# Patient Record
Sex: FEMALE | Race: BLACK OR AFRICAN AMERICAN | NOT HISPANIC OR LATINO | Employment: FULL TIME | URBAN - METROPOLITAN AREA
[De-identification: names, ages, dates, MRNs, and addresses within clinical notes are randomized per-mention and may not be internally consistent; named-entity substitution may affect disease eponyms.]

---

## 2017-02-24 ENCOUNTER — HISTORICAL (OUTPATIENT)
Dept: ADMINISTRATIVE | Facility: HOSPITAL | Age: 32
End: 2017-02-24

## 2017-03-24 ENCOUNTER — HISTORICAL (OUTPATIENT)
Dept: ADMINISTRATIVE | Facility: HOSPITAL | Age: 32
End: 2017-03-24

## 2019-01-28 ENCOUNTER — HISTORICAL (OUTPATIENT)
Dept: ADMINISTRATIVE | Facility: HOSPITAL | Age: 34
End: 2019-01-28

## 2019-01-28 LAB
CORTIS SERPL-SCNC: 6 MCG/DL
DEPRECATED CALCIDIOL+CALCIFEROL SERPL-MC: 35.83 NG/ML (ref 30–80)
GLUCOSE 1.5H P 100 G GLC PO SERPL-MCNC: 92 MG/DL
GLUCOSE 1H P 100 G GLC PO SERPL-MCNC: 119 MG/DL (ref 100–180)
GLUCOSE 2H P 100 G GLC PO SERPL-MCNC: 106 MG/DL (ref 70–140)
GLUCOSE 30M P 100 G GLC PO SERPL-MCNC: 134 MG/DL (ref 100–200)
GLUCOSE BS SERPL-MCNC: 85 MG/DL (ref 70–115)
T4 FREE SERPL-MCNC: 1.05 NG/DL (ref 0.76–1.46)
T4 SERPL-MCNC: 8.1 MCG/DL (ref 4.7–13.3)
TSH SERPL-ACNC: 0.84 MIU/L (ref 0.36–3.74)

## 2019-09-18 ENCOUNTER — HISTORICAL (OUTPATIENT)
Dept: RADIOLOGY | Facility: HOSPITAL | Age: 34
End: 2019-09-18

## 2020-11-05 ENCOUNTER — HISTORICAL (OUTPATIENT)
Dept: ADMINISTRATIVE | Facility: HOSPITAL | Age: 35
End: 2020-11-05

## 2020-11-05 LAB
ABS NEUT (OLG): 2.19 X10(3)/MCL (ref 2.1–9.2)
ALBUMIN SERPL-MCNC: 3.9 GM/DL (ref 3.5–5)
ALBUMIN/GLOB SERPL: 1.3 RATIO (ref 1.1–2)
ALP SERPL-CCNC: 77 UNIT/L (ref 40–150)
ALT SERPL-CCNC: 18 UNIT/L (ref 0–55)
AST SERPL-CCNC: 19 UNIT/L (ref 5–34)
BASOPHILS # BLD AUTO: 0 X10(3)/MCL (ref 0–0.2)
BASOPHILS NFR BLD AUTO: 1 %
BILIRUB SERPL-MCNC: 0.5 MG/DL
BILIRUBIN DIRECT+TOT PNL SERPL-MCNC: 0.2 MG/DL (ref 0–0.5)
BILIRUBIN DIRECT+TOT PNL SERPL-MCNC: 0.3 MG/DL (ref 0–0.8)
BUN SERPL-MCNC: 8.7 MG/DL (ref 7–18.7)
CALCIUM SERPL-MCNC: 8.9 MG/DL (ref 8.4–10.2)
CHLORIDE SERPL-SCNC: 104 MMOL/L (ref 98–107)
CHOLEST SERPL-MCNC: 220 MG/DL
CHOLEST/HDLC SERPL: 7 {RATIO} (ref 0–5)
CO2 SERPL-SCNC: 28 MMOL/L (ref 22–29)
CREAT SERPL-MCNC: 0.84 MG/DL (ref 0.55–1.02)
DEPRECATED CALCIDIOL+CALCIFEROL SERPL-MC: 27.8 NG/ML (ref 30–80)
EOSINOPHIL # BLD AUTO: 0.1 X10(3)/MCL (ref 0–0.9)
EOSINOPHIL NFR BLD AUTO: 1 %
ERYTHROCYTE [DISTWIDTH] IN BLOOD BY AUTOMATED COUNT: 13 % (ref 11.5–17)
EST. AVERAGE GLUCOSE BLD GHB EST-MCNC: 114 MG/DL
GLOBULIN SER-MCNC: 3 GM/DL (ref 2.4–3.5)
GLUCOSE SERPL-MCNC: 90 MG/DL (ref 74–100)
HBA1C MFR BLD: 5.6 %
HCT VFR BLD AUTO: 41.9 % (ref 37–47)
HDLC SERPL-MCNC: 30 MG/DL (ref 35–60)
HGB BLD-MCNC: 13.4 GM/DL (ref 12–16)
LDLC SERPL CALC-MCNC: 170 MG/DL (ref 50–140)
LYMPHOCYTES # BLD AUTO: 2.1 X10(3)/MCL (ref 0.6–4.6)
LYMPHOCYTES NFR BLD AUTO: 42 %
MCH RBC QN AUTO: 28.2 PG (ref 27–31)
MCHC RBC AUTO-ENTMCNC: 32 GM/DL (ref 33–36)
MCV RBC AUTO: 88.2 FL (ref 80–94)
MONOCYTES # BLD AUTO: 0.5 X10(3)/MCL (ref 0.1–1.3)
MONOCYTES NFR BLD AUTO: 11 %
NEUTROPHILS # BLD AUTO: 2.19 X10(3)/MCL (ref 2.1–9.2)
NEUTROPHILS NFR BLD AUTO: 44 %
PLATELET # BLD AUTO: 264 X10(3)/MCL (ref 130–400)
PMV BLD AUTO: 10.3 FL (ref 9.4–12.4)
POTASSIUM SERPL-SCNC: 4.3 MMOL/L (ref 3.5–5.1)
PROT SERPL-MCNC: 6.9 GM/DL (ref 6.4–8.3)
RBC # BLD AUTO: 4.75 X10(6)/MCL (ref 4.2–5.4)
SODIUM SERPL-SCNC: 141 MMOL/L (ref 136–145)
TRIGL SERPL-MCNC: 101 MG/DL (ref 37–140)
TSH SERPL-ACNC: 1.07 UIU/ML (ref 0.35–4.94)
VLDLC SERPL CALC-MCNC: 20 MG/DL
WBC # SPEC AUTO: 4.9 X10(3)/MCL (ref 4.5–11.5)

## 2021-02-11 LAB
PAP RECOMMENDATION EXT: NORMAL
PAP SMEAR: NORMAL

## 2021-05-12 ENCOUNTER — HISTORICAL (OUTPATIENT)
Dept: ADMINISTRATIVE | Facility: HOSPITAL | Age: 36
End: 2021-05-12

## 2021-05-12 LAB
CHOLEST SERPL-MCNC: 224 MG/DL
CHOLEST/HDLC SERPL: 7 {RATIO} (ref 0–5)
HDLC SERPL-MCNC: 34 MG/DL (ref 35–60)
LDLC SERPL CALC-MCNC: 169 MG/DL (ref 50–140)
TRIGL SERPL-MCNC: 105 MG/DL (ref 37–140)
VLDLC SERPL CALC-MCNC: 21 MG/DL

## 2021-10-29 ENCOUNTER — HISTORICAL (OUTPATIENT)
Dept: ADMINISTRATIVE | Facility: HOSPITAL | Age: 36
End: 2021-10-29

## 2021-10-29 LAB
ABS NEUT (OLG): 3.16 X10(3)/MCL (ref 2.1–9.2)
ALBUMIN SERPL-MCNC: 4.1 GM/DL (ref 3.5–5)
ALBUMIN/GLOB SERPL: 1.2 RATIO (ref 1.1–2)
ALP SERPL-CCNC: 69 UNIT/L (ref 40–150)
ALT SERPL-CCNC: 18 UNIT/L (ref 0–55)
AST SERPL-CCNC: 15 UNIT/L (ref 5–34)
BASOPHILS # BLD AUTO: 0 X10(3)/MCL (ref 0–0.2)
BASOPHILS NFR BLD AUTO: 0 %
BILIRUB SERPL-MCNC: 0.5 MG/DL
BILIRUBIN DIRECT+TOT PNL SERPL-MCNC: 0.2 MG/DL (ref 0–0.5)
BILIRUBIN DIRECT+TOT PNL SERPL-MCNC: 0.3 MG/DL (ref 0–0.8)
BUN SERPL-MCNC: 12.3 MG/DL (ref 7–18.7)
CALCIUM SERPL-MCNC: 9.9 MG/DL (ref 8.7–10.5)
CHLORIDE SERPL-SCNC: 101 MMOL/L (ref 98–107)
CHOLEST SERPL-MCNC: 196 MG/DL
CHOLEST/HDLC SERPL: 7 {RATIO} (ref 0–5)
CO2 SERPL-SCNC: 30 MMOL/L (ref 22–29)
CREAT SERPL-MCNC: 0.86 MG/DL (ref 0.55–1.02)
DEPRECATED CALCIDIOL+CALCIFEROL SERPL-MC: 71 NG/ML (ref 30–80)
EOSINOPHIL # BLD AUTO: 0.1 X10(3)/MCL (ref 0–0.9)
EOSINOPHIL NFR BLD AUTO: 2 %
ERYTHROCYTE [DISTWIDTH] IN BLOOD BY AUTOMATED COUNT: 13.4 % (ref 11.5–17)
GLOBULIN SER-MCNC: 3.3 GM/DL (ref 2.4–3.5)
GLUCOSE SERPL-MCNC: 92 MG/DL (ref 74–100)
HCT VFR BLD AUTO: 41.8 % (ref 37–47)
HDLC SERPL-MCNC: 29 MG/DL (ref 35–60)
HGB BLD-MCNC: 13.5 GM/DL (ref 12–16)
LDLC SERPL CALC-MCNC: 139 MG/DL (ref 50–140)
LYMPHOCYTES # BLD AUTO: 2.1 X10(3)/MCL (ref 0.6–4.6)
LYMPHOCYTES NFR BLD AUTO: 36 %
MCH RBC QN AUTO: 28.2 PG (ref 27–31)
MCHC RBC AUTO-ENTMCNC: 32.3 GM/DL (ref 33–36)
MCV RBC AUTO: 87.3 FL (ref 80–94)
MONOCYTES # BLD AUTO: 0.6 X10(3)/MCL (ref 0.1–1.3)
MONOCYTES NFR BLD AUTO: 9 %
NEUTROPHILS # BLD AUTO: 3.16 X10(3)/MCL (ref 2.1–9.2)
NEUTROPHILS NFR BLD AUTO: 53 %
PLATELET # BLD AUTO: 315 X10(3)/MCL (ref 130–400)
PMV BLD AUTO: 10.8 FL (ref 9.4–12.4)
POTASSIUM SERPL-SCNC: 4.4 MMOL/L (ref 3.5–5.1)
PROT SERPL-MCNC: 7.4 GM/DL (ref 6.4–8.3)
RBC # BLD AUTO: 4.79 X10(6)/MCL (ref 4.2–5.4)
SODIUM SERPL-SCNC: 139 MMOL/L (ref 136–145)
TRIGL SERPL-MCNC: 138 MG/DL (ref 37–140)
TSH SERPL-ACNC: 1.37 UIU/ML (ref 0.35–4.94)
VLDLC SERPL CALC-MCNC: 28 MG/DL
WBC # SPEC AUTO: 6 X10(3)/MCL (ref 4.5–11.5)

## 2021-12-29 ENCOUNTER — HISTORICAL (OUTPATIENT)
Dept: ADMINISTRATIVE | Facility: HOSPITAL | Age: 36
End: 2021-12-29

## 2021-12-29 LAB
FLUAV AG UPPER RESP QL IA.RAPID: NEGATIVE
FLUBV AG UPPER RESP QL IA.RAPID: NEGATIVE
SARS-COV-2 RNA RESP QL NAA+PROBE: DETECTED

## 2021-12-30 ENCOUNTER — HISTORICAL (OUTPATIENT)
Dept: ADMINISTRATIVE | Facility: HOSPITAL | Age: 36
End: 2021-12-30

## 2021-12-30 LAB
B-HCG FREE SERPL-ACNC: 212.84 MIU/ML
PROGEST SERPL-MCNC: 45.1 NG/ML

## 2022-01-03 ENCOUNTER — HISTORICAL (OUTPATIENT)
Dept: ADMINISTRATIVE | Facility: HOSPITAL | Age: 37
End: 2022-01-03

## 2022-01-03 LAB
B-HCG FREE SERPL-ACNC: 720.97 MIU/ML
PROGEST SERPL-MCNC: 31.7 NG/ML

## 2022-02-07 ENCOUNTER — HISTORICAL (OUTPATIENT)
Dept: ADMINISTRATIVE | Facility: HOSPITAL | Age: 37
End: 2022-02-07

## 2022-02-09 ENCOUNTER — HISTORICAL (OUTPATIENT)
Dept: ADMINISTRATIVE | Facility: HOSPITAL | Age: 37
End: 2022-02-09

## 2022-02-09 LAB
ERYTHROCYTE [DISTWIDTH] IN BLOOD BY AUTOMATED COUNT: 14.9 % (ref 11.5–17)
HCT VFR BLD AUTO: 36.2 % (ref 37–47)
HGB BLD-MCNC: 11.1 G/DL (ref 12–16)
MCH RBC QN AUTO: 25.7 PG (ref 27–31)
MCHC RBC AUTO-ENTMCNC: 30.7 G/DL (ref 33–36)
MCV RBC AUTO: 83.8 FL (ref 80–94)
PLATELET # BLD AUTO: 334 10*3/UL (ref 130–400)
PMV BLD AUTO: 10.3 FL (ref 9.4–12.4)
RBC # BLD AUTO: 4.32 10*6/UL (ref 4.2–5.4)
WBC # SPEC AUTO: 5.7 10*3/UL (ref 4.5–11.5)

## 2022-02-17 ENCOUNTER — HISTORICAL (OUTPATIENT)
Dept: ADMINISTRATIVE | Facility: HOSPITAL | Age: 37
End: 2022-02-17

## 2022-02-17 LAB
CBG: 85 (ref 70–115)
GLUCOSE 1H P 100 G GLC PO SERPL-MCNC: 192 MG/DL (ref 100–180)
GLUCOSE 2H P 100 G GLC PO SERPL-MCNC: 164 MG/DL (ref 70–140)
GLUCOSE 3H P 100 G GLC PO SERPL-MCNC: 102 MG/DL (ref 70–115)
GLUCOSE BS SERPL-MCNC: 85 MG/DL (ref 70–115)

## 2022-03-14 ENCOUNTER — HISTORICAL (OUTPATIENT)
Dept: ADMINISTRATIVE | Facility: HOSPITAL | Age: 37
End: 2022-03-14

## 2022-03-14 LAB — CREAT SERPL-MCNC: 0.7 MG/DL (ref 0.55–1.02)

## 2022-03-17 ENCOUNTER — HISTORICAL (OUTPATIENT)
Dept: SURGERY | Facility: HOSPITAL | Age: 37
End: 2022-03-17

## 2022-03-17 LAB
ABS NEUT (OLG): 3.3 (ref 2.1–9.2)
APPEARANCE, UA: CLEAR
BACTERIA SPEC CULT: NORMAL
BASOPHILS # BLD AUTO: 0 10*3/UL (ref 0–0.2)
BASOPHILS NFR BLD AUTO: 0 %
BILIRUB UR QL STRIP: NEGATIVE
BUDDING YEAST: NORMAL
CASTS, UA: NORMAL
CBG: 66 (ref 70–115)
CBG: 72 (ref 70–115)
COLOR UR: YELLOW
CRYSTALS: NORMAL
EOSINOPHIL # BLD AUTO: 0 10*3/UL (ref 0–0.9)
EOSINOPHIL NFR BLD AUTO: 1 %
ERYTHROCYTE [DISTWIDTH] IN BLOOD BY AUTOMATED COUNT: 17.2 % (ref 11.5–17)
GLUCOSE (UA): NEGATIVE
HCT VFR BLD AUTO: 37.4 % (ref 37–47)
HGB BLD-MCNC: 11.8 G/DL (ref 12–16)
HGB UR QL STRIP: NEGATIVE
KETONES UR QL STRIP: NORMAL
LEUKOCYTE ESTERASE UR QL STRIP: NEGATIVE
LYMPHOCYTES # BLD AUTO: 1.7 10*3/UL (ref 0.6–4.6)
LYMPHOCYTES NFR BLD AUTO: 30 %
MANUAL DIFF? (OHS): NO
MCH RBC QN AUTO: 25.7 PG (ref 27–31)
MCHC RBC AUTO-ENTMCNC: 31.6 G/DL (ref 33–36)
MCV RBC AUTO: 81.5 FL (ref 80–94)
MONOCYTES # BLD AUTO: 0.6 10*3/UL (ref 0.1–1.3)
MONOCYTES NFR BLD AUTO: 10 %
NEUTROPHILS # BLD AUTO: 3.3 10*3/UL (ref 2.1–9.2)
NEUTROPHILS NFR BLD AUTO: 58 %
NITRITE UR QL STRIP: NEGATIVE
PH UR STRIP: 7 [PH] (ref 5–9)
PLATELET # BLD AUTO: 289 10*3/UL (ref 130–400)
PMV BLD AUTO: 10.6 FL (ref 9.4–12.4)
PROT UR QL STRIP: NEGATIVE
RBC # BLD AUTO: 4.59 10*6/UL (ref 4.2–5.4)
RBC #/AREA URNS HPF: NORMAL /[HPF] (ref 0–2)
SMALL ROUND CELLS, UA: NORMAL
SP GR UR STRIP: 1.02 (ref 1–1.03)
SPERM URNS QL MICRO: NORMAL
SQUAMOUS EPITHELIAL, UA: NORMAL (ref 0–4)
TSH SERPL-ACNC: 1.08 M[IU]/L (ref 0.35–4.94)
UROBILINOGEN UR STRIP-ACNC: 1
WBC # SPEC AUTO: 5.7 10*3/UL (ref 4.5–11.5)
WBC #/AREA URNS HPF: NORMAL /[HPF] (ref 0–2)

## 2022-04-11 ENCOUNTER — HISTORICAL (OUTPATIENT)
Dept: ADMINISTRATIVE | Facility: HOSPITAL | Age: 37
End: 2022-04-11
Payer: COMMERCIAL

## 2022-04-25 VITALS
DIASTOLIC BLOOD PRESSURE: 80 MMHG | OXYGEN SATURATION: 99 % | BODY MASS INDEX: 35.11 KG/M2 | WEIGHT: 198.13 LBS | SYSTOLIC BLOOD PRESSURE: 120 MMHG | HEIGHT: 63 IN

## 2022-04-29 ENCOUNTER — HISTORICAL (OUTPATIENT)
Dept: ADMINISTRATIVE | Facility: HOSPITAL | Age: 37
End: 2022-04-29
Payer: COMMERCIAL

## 2022-04-29 LAB — TSH SERPL-ACNC: 1.15 M[IU]/L (ref 0.35–4.94)

## 2022-05-08 ENCOUNTER — HOSPITAL ENCOUNTER (EMERGENCY)
Facility: HOSPITAL | Age: 37
Discharge: HOME | End: 2022-05-08
Payer: COMMERCIAL

## 2022-05-08 VITALS
WEIGHT: 197 LBS | RESPIRATION RATE: 20 BRPM | SYSTOLIC BLOOD PRESSURE: 136 MMHG | HEART RATE: 101 BPM | DIASTOLIC BLOOD PRESSURE: 87 MMHG | BODY MASS INDEX: 34.91 KG/M2 | HEIGHT: 63 IN

## 2022-05-08 DIAGNOSIS — R10.9 ABDOMINAL PAIN AFFECTING PREGNANCY: Primary | ICD-10-CM

## 2022-05-08 DIAGNOSIS — O26.899 ABDOMINAL PAIN AFFECTING PREGNANCY: Primary | ICD-10-CM

## 2022-05-08 LAB
APPEARANCE UR: CLEAR
BACTERIA #/AREA URNS AUTO: NORMAL /HPF
BILIRUB UR QL STRIP.AUTO: NEGATIVE MG/DL
COLOR UR AUTO: YELLOW
GLUCOSE UR QL STRIP.AUTO: NEGATIVE MG/DL
KETONES UR QL STRIP.AUTO: NEGATIVE MG/DL
LEUKOCYTE ESTERASE UR QL STRIP.AUTO: NEGATIVE UNIT/L
NITRITE UR QL STRIP.AUTO: NEGATIVE
PH UR STRIP.AUTO: 6.5 [PH]
PROT UR QL STRIP.AUTO: NEGATIVE MG/DL
RBC #/AREA URNS AUTO: NORMAL /HPF
RBC UR QL AUTO: NEGATIVE UNIT/L
SP GR UR STRIP.AUTO: 1.01 (ref 1–1.03)
SQUAMOUS #/AREA URNS AUTO: NORMAL /LPF
UROBILINOGEN UR STRIP-ACNC: 1 MG/DL
WBC #/AREA URNS AUTO: NORMAL /HPF

## 2022-05-08 PROCEDURE — 96360 HYDRATION IV INFUSION INIT: CPT

## 2022-05-08 PROCEDURE — 81003 URINALYSIS AUTO W/O SCOPE: CPT | Performed by: OBSTETRICS & GYNECOLOGY

## 2022-05-08 PROCEDURE — 63600175 PHARM REV CODE 636 W HCPCS: Performed by: OBSTETRICS & GYNECOLOGY

## 2022-05-08 PROCEDURE — 99285 EMERGENCY DEPT VISIT HI MDM: CPT | Mod: 25

## 2022-05-08 PROCEDURE — 99284 EMERGENCY DEPT VISIT MOD MDM: CPT | Mod: 25

## 2022-05-08 PROCEDURE — 96361 HYDRATE IV INFUSION ADD-ON: CPT

## 2022-05-08 PROCEDURE — 81015 MICROSCOPIC EXAM OF URINE: CPT | Performed by: OBSTETRICS & GYNECOLOGY

## 2022-05-08 RX ORDER — METFORMIN HYDROCHLORIDE 1000 MG/1
1000 TABLET ORAL 2 TIMES DAILY
COMMUNITY
Start: 2022-04-24 | End: 2022-08-15

## 2022-05-08 RX ORDER — HUMAN INSULIN 100 [IU]/ML
16 INJECTION, SUSPENSION SUBCUTANEOUS NIGHTLY
Status: ON HOLD | COMMUNITY
Start: 2022-04-27 | End: 2022-08-15 | Stop reason: HOSPADM

## 2022-05-08 RX ORDER — PROGESTERONE 200 MG/1
200 CAPSULE ORAL NIGHTLY
Status: ON HOLD | COMMUNITY
Start: 2022-04-12 | End: 2022-08-15 | Stop reason: HOSPADM

## 2022-05-08 RX ORDER — NAPROXEN SODIUM 220 MG/1
162 TABLET, FILM COATED ORAL DAILY
Status: ON HOLD | COMMUNITY
End: 2022-08-15 | Stop reason: HOSPADM

## 2022-05-08 RX ORDER — LEVOTHYROXINE SODIUM 25 UG/1
25 TABLET ORAL DAILY
COMMUNITY
Start: 2022-05-08 | End: 2022-11-17

## 2022-05-08 RX ADMIN — SODIUM CHLORIDE, SODIUM LACTATE, POTASSIUM CHLORIDE, CALCIUM CHLORIDE AND DEXTROSE MONOHYDRATE 1000 ML: 5; 600; 310; 30; 20 INJECTION, SOLUTION INTRAVENOUS at 11:05

## 2022-05-08 NOTE — PROGRESS NOTES
[facility] - Emergency Dept (OB)  Obstetrics  Antepartum Progress Note    Patient Name: Tawnya Love  MRN: [id number]  Admission Date: 2022  Hospital Length of Stay: 0 days  Attending Physician: No att. providers found  Primary Care Provider: No primary care provider on file.    Subjective:     Principal Problem:<principal problem not specified>    HPI:  This  @ 22 3/7 weeks gestation presents with complaints of pressure  Denies pain or bleeding  History of prior  deliveries and currently on vaginal progesterone and a cerclage in  place    Speculum: no cervical dilation, cerclage in place  Tracing: no contractions, fetal heart tones noted      A/P: abdominal pain  -ua  -cervical length  -iv fluids  -discharge home provided stable      Hospital Course:  UA  IV fluids  Ultrasound  Discharged home      No new subjective & objective note has been filed under this hospital service since the last note was generated.    Assessment/Plan:     36 y.o. female  at 22w3d for:    No notes have been filed under this hospital service.  Service: Obstetrics and Gynecology        Chase Weldon MD  Obstetrics  [facility] - Emergency Dept (OB)

## 2022-05-08 NOTE — HPI
This  @ 22 3/7 weeks gestation presents with complaints of pressure  Denies pain or bleeding  History of prior  deliveries and currently on vaginal progesterone and a cerclage in  place    Speculum: no cervical dilation, cerclage in place  Tracing: no contractions, fetal heart tones noted      A/P: abdominal pain  -ua  -cervical length  -iv fluids  -discharge home provided stable

## 2022-05-08 NOTE — Clinical Note
Discharge instructions given to patient. Pt left the floor ambulating with significant other at side. No distress noted.

## 2022-05-08 NOTE — DISCHARGE SUMMARY
[facility] - Emergency Dept (OB)  Discharge Note  Short Stay    * No surgery found *    OUTCOME: Patient tolerated treatment/procedure well without complication and is now ready for discharge.    DISPOSITION: Home or Self Care    FINAL DIAGNOSIS:  <principal problem not specified>    FOLLOWUP: In clinic    DISCHARGE INSTRUCTIONS:  No discharge procedures on file.   This  @ 22 3/7 weeks gestation presents with complaints of pressure  Denies pain or bleeding  History of prior  deliveries and currently on vaginal progesterone and a cerclage in  place    Speculum: no cervical dilation, cerclage in place  Tracing: no contractions, fetal heart tones noted      A/P: abdominal pain  -ua  -cervical length  -iv fluids  -discharge home provided stable    TIME SPENT ON DISCHARGE: 5 minutes

## 2022-05-31 ENCOUNTER — LAB VISIT (OUTPATIENT)
Dept: LAB | Facility: HOSPITAL | Age: 37
End: 2022-05-31
Attending: OBSTETRICS & GYNECOLOGY
Payer: COMMERCIAL

## 2022-05-31 DIAGNOSIS — O09.92 SUPERVISION OF HIGH RISK PREGNANCY IN SECOND TRIMESTER: ICD-10-CM

## 2022-05-31 DIAGNOSIS — Z3A.25 25 WEEKS GESTATION OF PREGNANCY: Primary | ICD-10-CM

## 2022-05-31 LAB — TSH SERPL-ACNC: 0.89 UIU/ML (ref 0.35–4.94)

## 2022-05-31 PROCEDURE — 36415 COLL VENOUS BLD VENIPUNCTURE: CPT

## 2022-05-31 PROCEDURE — 84443 ASSAY THYROID STIM HORMONE: CPT

## 2022-06-03 NOTE — ED PROVIDER NOTES
Encounter Date: 2022       History     Chief Complaint   Patient presents with    Abdominal Pain     Abdominal pressure     HPI  Review of patient's allergies indicates:  No Known Allergies  Past Medical History:   Diagnosis Date    Personal history of gestational diabetes      labor in second trimester with  delivery in second trimester     Thyroid disease      Past Surgical History:   Procedure Laterality Date    CERVICAL CERCLAGE N/A 2022     No family history on file.  Social History     Substance Use Topics    Alcohol use: Not Currently     Review of Systems    Physical Exam     Initial Vitals [22 1050]   BP Pulse Resp Temp SpO2   136/87 101 20 -- --      MAP       --         Physical Exam    ED Course   Procedures  Labs Reviewed   URINALYSIS, REFLEX TO URINE CULTURE - Normal   URINALYSIS, MICROSCOPIC - Normal          Imaging Results          US OB Limited 1 Or More Gestations (Final result)  Result time 22 12:29:15    Final result by Swati Palacio MD (22 12:29:15)                 Impression:      Average cervical length of 3 cm.      Electronically signed by: Swati Palacio  Date:    2022  Time:    12:29             Narrative:    EXAMINATION:  US OB LIMITED 1 OR MORE GESTATIONS    CLINICAL HISTORY:  cervical length;    TECHNIQUE:  Limited OB ultrasound was performed to evaluate the cervical length.    COMPARISON:  None from this gestation    FINDINGS:  Closed cervix with average cervical length measures 3 cm.                                 Medications   dextrose 5% lactated ringers bolus 1,000 mL (0 mLs Intravenous Stopped 22 1310)                          Clinical Impression:    Abdominal pain effecting pregnancy     ED Disposition Condition    Discharge         ED Prescriptions     None        Follow-up Information    None          Chase Weldon MD  22 [id number]

## 2022-07-13 ENCOUNTER — LAB VISIT (OUTPATIENT)
Dept: LAB | Facility: HOSPITAL | Age: 37
End: 2022-07-13
Attending: OBSTETRICS & GYNECOLOGY
Payer: COMMERCIAL

## 2022-07-13 DIAGNOSIS — O24.414 GESTATIONAL DIABETES REQUIRING INSULIN: ICD-10-CM

## 2022-07-13 DIAGNOSIS — E07.9 THYROID DYSFUNCTION IN PREGNANCY, ANTEPARTUM, THIRD TRIMESTER: Primary | ICD-10-CM

## 2022-07-13 DIAGNOSIS — O26.23 PREGNANCY CARE OF HABITUAL ABORTER IN THIRD TRIMESTER: ICD-10-CM

## 2022-07-13 DIAGNOSIS — O26.873 CERVICAL SHORTENING IN THIRD TRIMESTER: ICD-10-CM

## 2022-07-13 DIAGNOSIS — O99.283 THYROID DYSFUNCTION IN PREGNANCY, ANTEPARTUM, THIRD TRIMESTER: Primary | ICD-10-CM

## 2022-07-13 DIAGNOSIS — Z3A.31 31 WEEKS GESTATION OF PREGNANCY: ICD-10-CM

## 2022-07-13 DIAGNOSIS — O10.013 PRE-EXISTING ESSENTIAL HYPERTENSION COMPLICATING PREGNANCY IN THIRD TRIMESTER: ICD-10-CM

## 2022-07-13 LAB
ALT SERPL-CCNC: 29 UNIT/L (ref 0–55)
AST SERPL-CCNC: 16 UNIT/L (ref 5–34)
BASOPHILS # BLD AUTO: 0.01 X10(3)/MCL (ref 0–0.2)
BASOPHILS NFR BLD AUTO: 0.2 %
EOSINOPHIL # BLD AUTO: 0.05 X10(3)/MCL (ref 0–0.9)
EOSINOPHIL NFR BLD AUTO: 0.8 %
ERYTHROCYTE [DISTWIDTH] IN BLOOD BY AUTOMATED COUNT: 14.7 % (ref 11.5–17)
HCT VFR BLD AUTO: 33 % (ref 37–47)
HGB BLD-MCNC: 10.4 GM/DL (ref 12–16)
IMM GRANULOCYTES # BLD AUTO: 0.03 X10(3)/MCL (ref 0–0.04)
IMM GRANULOCYTES NFR BLD AUTO: 0.5 %
LDH SERPL-CCNC: 125 U/L (ref 125–220)
LYMPHOCYTES # BLD AUTO: 1.59 X10(3)/MCL (ref 0.6–4.6)
LYMPHOCYTES NFR BLD AUTO: 25.8 %
MCH RBC QN AUTO: 27.4 PG (ref 27–31)
MCHC RBC AUTO-ENTMCNC: 31.5 MG/DL (ref 33–36)
MCV RBC AUTO: 87.1 FL (ref 80–94)
MONOCYTES # BLD AUTO: 0.67 X10(3)/MCL (ref 0.1–1.3)
MONOCYTES NFR BLD AUTO: 10.9 %
NEUTROPHILS # BLD AUTO: 3.8 X10(3)/MCL (ref 2.1–9.2)
NEUTROPHILS NFR BLD AUTO: 61.8 %
NRBC BLD AUTO-RTO: 0 %
PLATELET # BLD AUTO: 278 X10(3)/MCL (ref 130–400)
PMV BLD AUTO: 11.1 FL (ref 7.4–10.4)
RBC # BLD AUTO: 3.79 X10(6)/MCL (ref 4.2–5.4)
URATE SERPL-MCNC: 4.9 MG/DL (ref 2.6–6)
WBC # SPEC AUTO: 6.2 X10(3)/MCL (ref 4.5–11.5)

## 2022-07-13 PROCEDURE — 84450 TRANSFERASE (AST) (SGOT): CPT

## 2022-07-13 PROCEDURE — 85025 COMPLETE CBC W/AUTO DIFF WBC: CPT

## 2022-07-13 PROCEDURE — 36415 COLL VENOUS BLD VENIPUNCTURE: CPT

## 2022-07-13 PROCEDURE — 84460 ALANINE AMINO (ALT) (SGPT): CPT

## 2022-07-13 PROCEDURE — 84550 ASSAY OF BLOOD/URIC ACID: CPT

## 2022-07-13 PROCEDURE — 83615 LACTATE (LD) (LDH) ENZYME: CPT

## 2022-07-25 ENCOUNTER — LAB VISIT (OUTPATIENT)
Dept: LAB | Facility: HOSPITAL | Age: 37
End: 2022-07-25
Attending: OBSTETRICS & GYNECOLOGY
Payer: COMMERCIAL

## 2022-07-25 DIAGNOSIS — E07.9 THYROID DYSFUNCTION IN PREGNANCY, ANTEPARTUM, THIRD TRIMESTER: ICD-10-CM

## 2022-07-25 DIAGNOSIS — O10.013 PRE-EXISTING ESSENTIAL HYPERTENSION COMPLICATING PREGNANCY IN THIRD TRIMESTER: Primary | ICD-10-CM

## 2022-07-25 DIAGNOSIS — O24.414 GESTATIONAL DIABETES REQUIRING INSULIN: ICD-10-CM

## 2022-07-25 DIAGNOSIS — O26.23 PREGNANCY CARE OF HABITUAL ABORTER IN THIRD TRIMESTER: ICD-10-CM

## 2022-07-25 DIAGNOSIS — Z3A.33 33 WEEKS GESTATION OF PREGNANCY: ICD-10-CM

## 2022-07-25 DIAGNOSIS — O09.213 SUPERVISION OF PREGNANCY WITH HISTORY OF PRE-TERM LABOR IN THIRD TRIMESTER: ICD-10-CM

## 2022-07-25 DIAGNOSIS — O99.213 OBESITY COMPLICATING PREGNANCY IN THIRD TRIMESTER: ICD-10-CM

## 2022-07-25 DIAGNOSIS — O99.283 THYROID DYSFUNCTION IN PREGNANCY, ANTEPARTUM, THIRD TRIMESTER: ICD-10-CM

## 2022-07-25 DIAGNOSIS — E03.9 HYPOTHYROIDISM, UNSPECIFIED TYPE: ICD-10-CM

## 2022-07-25 LAB — TSH SERPL-ACNC: 1.44 UIU/ML (ref 0.35–4.94)

## 2022-07-25 PROCEDURE — 84443 ASSAY THYROID STIM HORMONE: CPT

## 2022-07-25 PROCEDURE — 36415 COLL VENOUS BLD VENIPUNCTURE: CPT

## 2022-08-10 PROBLEM — O16.9 HYPERTENSION IN PREGNANCY, ANTEPARTUM: Status: ACTIVE | Noted: 2022-08-10

## 2022-08-11 ENCOUNTER — HOSPITAL ENCOUNTER (INPATIENT)
Facility: HOSPITAL | Age: 37
LOS: 4 days | Discharge: HOME | End: 2022-08-15
Attending: STUDENT IN AN ORGANIZED HEALTH CARE EDUCATION/TRAINING PROGRAM | Admitting: OBSTETRICS & GYNECOLOGY
Payer: COMMERCIAL

## 2022-08-11 DIAGNOSIS — O14.13 SEVERE PRE-ECLAMPSIA IN THIRD TRIMESTER: Primary | ICD-10-CM

## 2022-08-11 DIAGNOSIS — I10 CHRONIC HYPERTENSION: ICD-10-CM

## 2022-08-11 LAB
ALBUMIN SERPL-MCNC: 2.9 GM/DL (ref 3.5–5)
ALBUMIN/GLOB SERPL: 0.9 RATIO (ref 1.1–2)
ALP SERPL-CCNC: 185 UNIT/L (ref 40–150)
ALT SERPL-CCNC: 22 UNIT/L (ref 0–55)
AST SERPL-CCNC: 20 UNIT/L (ref 5–34)
BASOPHILS # BLD AUTO: 0.01 X10(3)/MCL (ref 0–0.2)
BASOPHILS # BLD AUTO: 0.02 X10(3)/MCL (ref 0–0.2)
BASOPHILS NFR BLD AUTO: 0.1 %
BASOPHILS NFR BLD AUTO: 0.3 %
BILIRUBIN DIRECT+TOT PNL SERPL-MCNC: 0.4 MG/DL
BUN SERPL-MCNC: 9.1 MG/DL (ref 7–18.7)
C TRACH RRNA SPEC QL PROBE: NEGATIVE
CALCIUM SERPL-MCNC: 9.2 MG/DL (ref 8.4–10.2)
CHLORIDE SERPL-SCNC: 108 MMOL/L (ref 98–107)
CO2 SERPL-SCNC: 18 MMOL/L (ref 22–29)
CREAT SERPL-MCNC: 0.71 MG/DL (ref 0.55–1.02)
EOSINOPHIL # BLD AUTO: 0.02 X10(3)/MCL (ref 0–0.9)
EOSINOPHIL # BLD AUTO: 0.02 X10(3)/MCL (ref 0–0.9)
EOSINOPHIL NFR BLD AUTO: 0.3 %
EOSINOPHIL NFR BLD AUTO: 0.3 %
ERYTHROCYTE [DISTWIDTH] IN BLOOD BY AUTOMATED COUNT: 15.8 % (ref 11.5–17)
ERYTHROCYTE [DISTWIDTH] IN BLOOD BY AUTOMATED COUNT: 15.9 % (ref 11.5–17)
GFR SERPLBLD CREATININE-BSD FMLA CKD-EPI: >60 MLS/MIN/1.73/M2
GLOBULIN SER-MCNC: 3.4 GM/DL (ref 2.4–3.5)
GLUCOSE SERPL-MCNC: 70 MG/DL (ref 74–100)
GROUP & RH: NORMAL
HBV SURFACE AG SERPL QL IA: NEGATIVE
HCT VFR BLD AUTO: 31.9 % (ref 37–47)
HCT VFR BLD AUTO: 32.6 % (ref 37–47)
HGB BLD-MCNC: 10 GM/DL (ref 12–16)
HGB BLD-MCNC: 10.3 GM/DL (ref 12–16)
HIV 1+2 AB+HIV1 P24 AG SERPL QL IA: NONREACTIVE
HIV-1 AND HIV-2 ANTIBODIES: NEGATIVE
IMM GRANULOCYTES # BLD AUTO: 0.06 X10(3)/MCL (ref 0–0.04)
IMM GRANULOCYTES # BLD AUTO: 0.06 X10(3)/MCL (ref 0–0.04)
IMM GRANULOCYTES NFR BLD AUTO: 0.8 %
IMM GRANULOCYTES NFR BLD AUTO: 0.8 %
INDIRECT COOMBS GEL: NORMAL
LDH SERPL-CCNC: 182 U/L (ref 125–220)
LYMPHOCYTES # BLD AUTO: 1.91 X10(3)/MCL (ref 0.6–4.6)
LYMPHOCYTES # BLD AUTO: 1.92 X10(3)/MCL (ref 0.6–4.6)
LYMPHOCYTES NFR BLD AUTO: 25.2 %
LYMPHOCYTES NFR BLD AUTO: 26.3 %
MCH RBC QN AUTO: 27.2 PG (ref 27–31)
MCH RBC QN AUTO: 27.4 PG (ref 27–31)
MCHC RBC AUTO-ENTMCNC: 31.3 MG/DL (ref 33–36)
MCHC RBC AUTO-ENTMCNC: 31.6 MG/DL (ref 33–36)
MCV RBC AUTO: 86.7 FL (ref 80–94)
MCV RBC AUTO: 86.7 FL (ref 80–94)
MONOCYTES # BLD AUTO: 0.78 X10(3)/MCL (ref 0.1–1.3)
MONOCYTES # BLD AUTO: 0.85 X10(3)/MCL (ref 0.1–1.3)
MONOCYTES NFR BLD AUTO: 10.7 %
MONOCYTES NFR BLD AUTO: 11.2 %
N GONORRHOEAE, AMPLIFIED DNA: NEGATIVE
NEUTROPHILS # BLD AUTO: 4.5 X10(3)/MCL (ref 2.1–9.2)
NEUTROPHILS # BLD AUTO: 4.7 X10(3)/MCL (ref 2.1–9.2)
NEUTROPHILS NFR BLD AUTO: 61.6 %
NEUTROPHILS NFR BLD AUTO: 62.4 %
NRBC BLD AUTO-RTO: 0 %
NRBC BLD AUTO-RTO: 0.3 %
PLATELET # BLD AUTO: 278 X10(3)/MCL (ref 130–400)
PLATELET # BLD AUTO: 312 X10(3)/MCL (ref 130–400)
PMV BLD AUTO: 11.3 FL (ref 7.4–10.4)
PMV BLD AUTO: 11.5 FL (ref 7.4–10.4)
POCT GLUCOSE: 72 MG/DL (ref 70–110)
POCT GLUCOSE: 78 MG/DL (ref 70–110)
POTASSIUM SERPL-SCNC: 4.2 MMOL/L (ref 3.5–5.1)
PRENATAL STREP B CULTURE: NEGATIVE
PROT SERPL-MCNC: 6.3 GM/DL (ref 6.4–8.3)
RBC # BLD AUTO: 3.68 X10(6)/MCL (ref 4.2–5.4)
RBC # BLD AUTO: 3.76 X10(6)/MCL (ref 4.2–5.4)
RPR: NORMAL
RUBELLA IMMUNE STATUS: NORMAL
SODIUM SERPL-SCNC: 137 MMOL/L (ref 136–145)
T PALLIDUM AB SER QL: NONREACTIVE
URATE SERPL-MCNC: 7.6 MG/DL (ref 2.6–6)
WBC # SPEC AUTO: 7.3 X10(3)/MCL (ref 4.5–11.5)
WBC # SPEC AUTO: 7.6 X10(3)/MCL (ref 4.5–11.5)

## 2022-08-11 PROCEDURE — 25000003 PHARM REV CODE 250: Performed by: OBSTETRICS & GYNECOLOGY

## 2022-08-11 PROCEDURE — 83615 LACTATE (LD) (LDH) ENZYME: CPT | Performed by: STUDENT IN AN ORGANIZED HEALTH CARE EDUCATION/TRAINING PROGRAM

## 2022-08-11 PROCEDURE — 86780 TREPONEMA PALLIDUM: CPT | Performed by: STUDENT IN AN ORGANIZED HEALTH CARE EDUCATION/TRAINING PROGRAM

## 2022-08-11 PROCEDURE — 87389 HIV-1 AG W/HIV-1&-2 AB AG IA: CPT | Performed by: STUDENT IN AN ORGANIZED HEALTH CARE EDUCATION/TRAINING PROGRAM

## 2022-08-11 PROCEDURE — 80053 COMPREHEN METABOLIC PANEL: CPT | Performed by: STUDENT IN AN ORGANIZED HEALTH CARE EDUCATION/TRAINING PROGRAM

## 2022-08-11 PROCEDURE — 25000003 PHARM REV CODE 250: Performed by: STUDENT IN AN ORGANIZED HEALTH CARE EDUCATION/TRAINING PROGRAM

## 2022-08-11 PROCEDURE — 84550 ASSAY OF BLOOD/URIC ACID: CPT | Performed by: STUDENT IN AN ORGANIZED HEALTH CARE EDUCATION/TRAINING PROGRAM

## 2022-08-11 PROCEDURE — 85025 COMPLETE CBC W/AUTO DIFF WBC: CPT | Performed by: STUDENT IN AN ORGANIZED HEALTH CARE EDUCATION/TRAINING PROGRAM

## 2022-08-11 PROCEDURE — 87340 HEPATITIS B SURFACE AG IA: CPT | Performed by: STUDENT IN AN ORGANIZED HEALTH CARE EDUCATION/TRAINING PROGRAM

## 2022-08-11 PROCEDURE — 11000001 HC ACUTE MED/SURG PRIVATE ROOM

## 2022-08-11 PROCEDURE — 86850 RBC ANTIBODY SCREEN: CPT | Performed by: STUDENT IN AN ORGANIZED HEALTH CARE EDUCATION/TRAINING PROGRAM

## 2022-08-11 PROCEDURE — 87653 STREP B DNA AMP PROBE: CPT | Performed by: STUDENT IN AN ORGANIZED HEALTH CARE EDUCATION/TRAINING PROGRAM

## 2022-08-11 PROCEDURE — 36415 COLL VENOUS BLD VENIPUNCTURE: CPT | Performed by: STUDENT IN AN ORGANIZED HEALTH CARE EDUCATION/TRAINING PROGRAM

## 2022-08-11 RX ORDER — LABETALOL HCL 20 MG/4 ML
20 SYRINGE (ML) INTRAVENOUS ONCE AS NEEDED
Status: COMPLETED | OUTPATIENT
Start: 2022-08-11 | End: 2022-08-11

## 2022-08-11 RX ORDER — HYDRALAZINE HYDROCHLORIDE 20 MG/ML
10 INJECTION INTRAMUSCULAR; INTRAVENOUS ONCE AS NEEDED
Status: DISCONTINUED | OUTPATIENT
Start: 2022-08-11 | End: 2022-08-11

## 2022-08-11 RX ORDER — TERBUTALINE SULFATE 1 MG/ML
0.25 INJECTION SUBCUTANEOUS
Status: DISCONTINUED | OUTPATIENT
Start: 2022-08-11 | End: 2022-08-12

## 2022-08-11 RX ORDER — LABETALOL HCL 20 MG/4 ML
80 SYRINGE (ML) INTRAVENOUS ONCE AS NEEDED
Status: COMPLETED | OUTPATIENT
Start: 2022-08-11 | End: 2022-08-14

## 2022-08-11 RX ORDER — MISOPROSTOL 100 UG/1
800 TABLET ORAL
Status: CANCELLED | OUTPATIENT
Start: 2022-08-11

## 2022-08-11 RX ORDER — MISOPROSTOL 100 MCG
25 TABLET ORAL EVERY 4 HOURS PRN
Status: DISCONTINUED | OUTPATIENT
Start: 2022-08-11 | End: 2022-08-11

## 2022-08-11 RX ORDER — MISOPROSTOL 100 MCG
50 TABLET ORAL EVERY 6 HOURS
Status: DISCONTINUED | OUTPATIENT
Start: 2022-08-12 | End: 2022-08-12

## 2022-08-11 RX ORDER — OXYTOCIN/RINGER'S LACTATE 30/500 ML
334 PLASTIC BAG, INJECTION (ML) INTRAVENOUS ONCE
Status: DISCONTINUED | OUTPATIENT
Start: 2022-08-11 | End: 2022-08-15 | Stop reason: HOSPADM

## 2022-08-11 RX ORDER — ONDANSETRON 4 MG/1
8 TABLET, ORALLY DISINTEGRATING ORAL EVERY 8 HOURS PRN
Status: DISCONTINUED | OUTPATIENT
Start: 2022-08-11 | End: 2022-08-15 | Stop reason: HOSPADM

## 2022-08-11 RX ORDER — OXYTOCIN/RINGER'S LACTATE 30/500 ML
95 PLASTIC BAG, INJECTION (ML) INTRAVENOUS ONCE
Status: DISCONTINUED | OUTPATIENT
Start: 2022-08-11 | End: 2022-08-15 | Stop reason: HOSPADM

## 2022-08-11 RX ORDER — LABETALOL HCL 20 MG/4 ML
40 SYRINGE (ML) INTRAVENOUS ONCE AS NEEDED
Status: COMPLETED | OUTPATIENT
Start: 2022-08-11 | End: 2022-08-12

## 2022-08-11 RX ORDER — MAGNESIUM SULFATE HEPTAHYDRATE 40 MG/ML
2 INJECTION, SOLUTION INTRAVENOUS CONTINUOUS
Status: DISCONTINUED | OUTPATIENT
Start: 2022-08-11 | End: 2022-08-15 | Stop reason: HOSPADM

## 2022-08-11 RX ORDER — MUPIROCIN 20 MG/G
OINTMENT TOPICAL
Status: CANCELLED | OUTPATIENT
Start: 2022-08-11

## 2022-08-11 RX ORDER — CALCIUM GLUCONATE 98 MG/ML
1 INJECTION, SOLUTION INTRAVENOUS
Status: DISCONTINUED | OUTPATIENT
Start: 2022-08-11 | End: 2022-08-15 | Stop reason: HOSPADM

## 2022-08-11 RX ORDER — LABETALOL 100 MG/1
600 TABLET, FILM COATED ORAL EVERY 8 HOURS
Status: DISCONTINUED | OUTPATIENT
Start: 2022-08-11 | End: 2022-08-12

## 2022-08-11 RX ORDER — LABETALOL HCL 20 MG/4 ML
40 SYRINGE (ML) INTRAVENOUS ONCE AS NEEDED
Status: COMPLETED | OUTPATIENT
Start: 2022-08-11 | End: 2022-08-11

## 2022-08-11 RX ORDER — PROCHLORPERAZINE EDISYLATE 5 MG/ML
5 INJECTION INTRAMUSCULAR; INTRAVENOUS EVERY 6 HOURS PRN
Status: DISCONTINUED | OUTPATIENT
Start: 2022-08-11 | End: 2022-08-15 | Stop reason: HOSPADM

## 2022-08-11 RX ORDER — LABETALOL HCL 20 MG/4 ML
80 SYRINGE (ML) INTRAVENOUS ONCE AS NEEDED
Status: COMPLETED | OUTPATIENT
Start: 2022-08-11 | End: 2022-08-11

## 2022-08-11 RX ORDER — LABETALOL HCL 20 MG/4 ML
20 SYRINGE (ML) INTRAVENOUS ONCE AS NEEDED
Status: COMPLETED | OUTPATIENT
Start: 2022-08-11 | End: 2022-08-12

## 2022-08-11 RX ORDER — CALCIUM CARBONATE 200(500)MG
500 TABLET,CHEWABLE ORAL 3 TIMES DAILY PRN
Status: DISCONTINUED | OUTPATIENT
Start: 2022-08-11 | End: 2022-08-15 | Stop reason: HOSPADM

## 2022-08-11 RX ORDER — MAGNESIUM SULFATE HEPTAHYDRATE 40 MG/ML
4 INJECTION, SOLUTION INTRAVENOUS ONCE
Status: COMPLETED | OUTPATIENT
Start: 2022-08-12 | End: 2022-08-12

## 2022-08-11 RX ORDER — LIDOCAINE HYDROCHLORIDE 10 MG/ML
10 INJECTION INFILTRATION; PERINEURAL ONCE AS NEEDED
Status: DISCONTINUED | OUTPATIENT
Start: 2022-08-11 | End: 2022-08-12

## 2022-08-11 RX ORDER — METHYLERGONOVINE MALEATE 0.2 MG/ML
200 INJECTION INTRAVENOUS
Status: CANCELLED | OUTPATIENT
Start: 2022-08-11

## 2022-08-11 RX ORDER — ACETAMINOPHEN 325 MG/1
650 TABLET ORAL EVERY 6 HOURS PRN
Status: DISCONTINUED | OUTPATIENT
Start: 2022-08-11 | End: 2022-08-15 | Stop reason: HOSPADM

## 2022-08-11 RX ORDER — DIPHENOXYLATE HYDROCHLORIDE AND ATROPINE SULFATE 2.5; .025 MG/1; MG/1
1 TABLET ORAL 4 TIMES DAILY PRN
Status: CANCELLED | OUTPATIENT
Start: 2022-08-11

## 2022-08-11 RX ORDER — HYDRALAZINE HYDROCHLORIDE 20 MG/ML
10 INJECTION INTRAMUSCULAR; INTRAVENOUS ONCE AS NEEDED
Status: COMPLETED | OUTPATIENT
Start: 2022-08-11 | End: 2022-08-12

## 2022-08-11 RX ORDER — BUTORPHANOL TARTRATE 2 MG/ML
2 INJECTION INTRAMUSCULAR; INTRAVENOUS
Status: CANCELLED | OUTPATIENT
Start: 2022-08-11

## 2022-08-11 RX ORDER — BUTORPHANOL TARTRATE 2 MG/ML
1 INJECTION INTRAMUSCULAR; INTRAVENOUS
Status: DISCONTINUED | OUTPATIENT
Start: 2022-08-11 | End: 2022-08-12

## 2022-08-11 RX ORDER — SODIUM CHLORIDE, SODIUM LACTATE, POTASSIUM CHLORIDE, CALCIUM CHLORIDE 600; 310; 30; 20 MG/100ML; MG/100ML; MG/100ML; MG/100ML
INJECTION, SOLUTION INTRAVENOUS CONTINUOUS
Status: DISCONTINUED | OUTPATIENT
Start: 2022-08-11 | End: 2022-08-15 | Stop reason: HOSPADM

## 2022-08-11 RX ORDER — SODIUM CHLORIDE, SODIUM LACTATE, POTASSIUM CHLORIDE, CALCIUM CHLORIDE 600; 310; 30; 20 MG/100ML; MG/100ML; MG/100ML; MG/100ML
INJECTION, SOLUTION INTRAVENOUS CONTINUOUS
Status: DISCONTINUED | OUTPATIENT
Start: 2022-08-11 | End: 2022-08-12

## 2022-08-11 RX ORDER — CARBOPROST TROMETHAMINE 250 UG/ML
250 INJECTION, SOLUTION INTRAMUSCULAR
Status: CANCELLED | OUTPATIENT
Start: 2022-08-11

## 2022-08-11 RX ORDER — LABETALOL HCL 20 MG/4 ML
SYRINGE (ML) INTRAVENOUS
Status: DISCONTINUED
Start: 2022-08-11 | End: 2022-08-11 | Stop reason: WASHOUT

## 2022-08-11 RX ORDER — SIMETHICONE 80 MG
1 TABLET,CHEWABLE ORAL 4 TIMES DAILY PRN
Status: DISCONTINUED | OUTPATIENT
Start: 2022-08-11 | End: 2022-08-15 | Stop reason: HOSPADM

## 2022-08-11 RX ADMIN — LABETALOL HYDROCHLORIDE 40 MG: 5 INJECTION, SOLUTION INTRAVENOUS at 03:08

## 2022-08-11 RX ADMIN — LABETALOL HYDROCHLORIDE 600 MG: 100 TABLET, FILM COATED ORAL at 10:08

## 2022-08-11 RX ADMIN — LABETALOL HYDROCHLORIDE 20 MG: 5 INJECTION, SOLUTION INTRAVENOUS at 02:08

## 2022-08-11 RX ADMIN — LABETALOL HYDROCHLORIDE 600 MG: 100 TABLET, FILM COATED ORAL at 02:08

## 2022-08-11 RX ADMIN — LABETALOL HYDROCHLORIDE 80 MG: 5 INJECTION, SOLUTION INTRAVENOUS at 03:08

## 2022-08-11 RX ADMIN — Medication 25 MCG: at 04:08

## 2022-08-11 NOTE — H&P
[facility] - Antepartum  Obstetrics  History & Physical    Patient Name: Tawnya Love  MRN: [id number]  Admission Date: 2022  Primary Care Provider: Shawna Stewart MD    Subjective:     Principal Problem:<principal problem not specified>    History of Present Illness: 36.0 WGA here for severe range BP's.  Admitted 2 days ago an given BMZ and had multiple med adjustments to keep BP's normal    Obstetric HPI:      This pregnancy has been complicated by CHTN, GDM, Cerclage for cervical insufficiency     OB History    Para Term  AB Living   4 0 0 0 3 0   SAB IAB Ectopic Multiple Live Births   3 0 0 0 0      # Outcome Date GA Lbr Uli/2nd Weight Sex Delivery Anes PTL Lv   4 Current            3 2017 10w0d    SAB   FD   2 2015 6w0d       FD   1 14 14w0d    SAB   FD     Past Medical History:   Diagnosis Date    Essential (primary) hypertension     Personal history of gestational diabetes      labor in second trimester with  delivery in second trimester     Thyroid disease      Past Surgical History:   Procedure Laterality Date    CERVICAL CERCLAGE N/A 2022    DILATION AND CURETTAGE OF UTERUS         PTA Medications   Medication Sig    aspirin 81 MG Chew Take 162 mg by mouth once daily.  labor management    levothyroxine (SYNTHROID) 25 MCG tablet Take 25 mcg by mouth once daily.    metFORMIN (GLUCOPHAGE) 1000 MG tablet Take 1,000 mg by mouth 2 (two) times daily.    NOVOLIN N NPH U-100 INSULIN 100 unit/mL injection Inject 12 Units into the skin nightly.    progesterone (PROMETRIUM) 200 MG capsule Place 200 mg vaginally nightly.       Review of patient's allergies indicates:  No Known Allergies     Family History     Problem Relation (Age of Onset)    Hypertension Father    No Known Problems Mother        Tobacco Use    Smoking status: Never Smoker    Smokeless tobacco: Never Used   Substance and Sexual Activity    Alcohol  use: Not Currently    Drug use: Never    Sexual activity: Yes     Review of Systems   Objective:     Vital Signs (Most Recent):  Temp: 96.1 °F (35.6 °C) (22 1345)  Pulse: 71 (22 1432)  BP: (!) 162/102 (22 1432) Vital Signs (24h Range):  Temp:  [96.1 °F (35.6 °C)] 96.1 °F (35.6 °C)  Pulse:  [] 71  SpO2:  [96 %] 96 %  BP: (123-171)/() 162/102        There is no height or weight on file to calculate BMI.        Physical Exam    Cervix:  Dilation:  /-2     Significant Labs:  No results found for: GROUPTRH, HEPBSAG, RUBELLAIGGSC, STREPBCULT, AFP, [id number]    I have personallly reviewed all pertinent lab results from the last 24 hours.    Assessment/Plan:     36 y.o. female  at 36w0d for:  CHTN with increasng BP's vs superimposed Pre E.  Given the multiple dose changes and severe BP's requiring IV meds will start IOL. Cerclage cut.  Low threshold to start Mg    There are no hospital problems to display for this patient.          Robbie Emanuel MD  Obstetrics  [facility] - Antepartum

## 2022-08-11 NOTE — NURSING
Cerclage removed pre MD at bedside. Pt tolerated procedure well, cerclage removed with no problems.

## 2022-08-12 LAB
HBV SURFACE AG SERPL QL IA: NONREACTIVE
POCT GLUCOSE: 88 MG/DL (ref 70–110)
POCT GLUCOSE: 88 MG/DL (ref 70–110)
STREP B PCR (OHS): NOT DETECTED

## 2022-08-12 PROCEDURE — 63600175 PHARM REV CODE 636 W HCPCS: Performed by: STUDENT IN AN ORGANIZED HEALTH CARE EDUCATION/TRAINING PROGRAM

## 2022-08-12 PROCEDURE — 72200006 HC VAGINAL DELIVERY LEVEL III

## 2022-08-12 PROCEDURE — 72100002 HC LABOR CARE, 1ST 8 HOURS

## 2022-08-12 PROCEDURE — 25000003 PHARM REV CODE 250: Performed by: OBSTETRICS & GYNECOLOGY

## 2022-08-12 PROCEDURE — 25000003 PHARM REV CODE 250: Performed by: STUDENT IN AN ORGANIZED HEALTH CARE EDUCATION/TRAINING PROGRAM

## 2022-08-12 PROCEDURE — 11000001 HC ACUTE MED/SURG PRIVATE ROOM

## 2022-08-12 PROCEDURE — 51702 INSERT TEMP BLADDER CATH: CPT

## 2022-08-12 RX ORDER — IBUPROFEN 600 MG/1
600 TABLET ORAL EVERY 6 HOURS
Status: DISCONTINUED | OUTPATIENT
Start: 2022-08-13 | End: 2022-08-12

## 2022-08-12 RX ORDER — ACETAMINOPHEN 325 MG/1
650 TABLET ORAL EVERY 4 HOURS PRN
Status: DISCONTINUED | OUTPATIENT
Start: 2022-08-12 | End: 2022-08-15 | Stop reason: HOSPADM

## 2022-08-12 RX ORDER — EPHEDRINE SULFATE 50 MG/ML
10 INJECTION, SOLUTION INTRAVENOUS
Status: DISCONTINUED | OUTPATIENT
Start: 2022-08-12 | End: 2022-08-12

## 2022-08-12 RX ORDER — LABETALOL HCL 20 MG/4 ML
40 SYRINGE (ML) INTRAVENOUS ONCE AS NEEDED
Status: DISCONTINUED | OUTPATIENT
Start: 2022-08-12 | End: 2022-08-15 | Stop reason: HOSPADM

## 2022-08-12 RX ORDER — FENTANYL/BUPIVACAINE/NS/PF 2-1250MCG
PLASTIC BAG, INJECTION (ML) INJECTION CONTINUOUS
Status: DISCONTINUED | OUTPATIENT
Start: 2022-08-12 | End: 2022-08-12

## 2022-08-12 RX ORDER — MISOPROSTOL 100 UG/1
400 TABLET ORAL ONCE
Status: DISCONTINUED | OUTPATIENT
Start: 2022-08-12 | End: 2022-08-15 | Stop reason: HOSPADM

## 2022-08-12 RX ORDER — HYDROCORTISONE 25 MG/G
CREAM TOPICAL 3 TIMES DAILY PRN
Status: DISCONTINUED | OUTPATIENT
Start: 2022-08-12 | End: 2022-08-15 | Stop reason: HOSPADM

## 2022-08-12 RX ORDER — IBUPROFEN 600 MG/1
600 TABLET ORAL EVERY 6 HOURS
Status: DISCONTINUED | OUTPATIENT
Start: 2022-08-12 | End: 2022-08-15 | Stop reason: HOSPADM

## 2022-08-12 RX ORDER — LABETALOL HCL 20 MG/4 ML
40 SYRINGE (ML) INTRAVENOUS ONCE AS NEEDED
Status: COMPLETED | OUTPATIENT
Start: 2022-08-12 | End: 2022-08-14

## 2022-08-12 RX ORDER — DIPHENHYDRAMINE HCL 25 MG
25 CAPSULE ORAL EVERY 4 HOURS PRN
Status: DISCONTINUED | OUTPATIENT
Start: 2022-08-12 | End: 2022-08-15 | Stop reason: HOSPADM

## 2022-08-12 RX ORDER — DIPHENHYDRAMINE HYDROCHLORIDE 50 MG/ML
25 INJECTION INTRAMUSCULAR; INTRAVENOUS EVERY 4 HOURS PRN
Status: DISCONTINUED | OUTPATIENT
Start: 2022-08-12 | End: 2022-08-15 | Stop reason: HOSPADM

## 2022-08-12 RX ORDER — DOCUSATE SODIUM 100 MG/1
200 CAPSULE, LIQUID FILLED ORAL 2 TIMES DAILY PRN
Status: DISCONTINUED | OUTPATIENT
Start: 2022-08-12 | End: 2022-08-15 | Stop reason: HOSPADM

## 2022-08-12 RX ORDER — OXYTOCIN/RINGER'S LACTATE 30/500 ML
95 PLASTIC BAG, INJECTION (ML) INTRAVENOUS ONCE
Status: DISCONTINUED | OUTPATIENT
Start: 2022-08-12 | End: 2022-08-15 | Stop reason: HOSPADM

## 2022-08-12 RX ORDER — LABETALOL HCL 20 MG/4 ML
20 SYRINGE (ML) INTRAVENOUS ONCE AS NEEDED
Status: DISCONTINUED | OUTPATIENT
Start: 2022-08-12 | End: 2022-08-15 | Stop reason: HOSPADM

## 2022-08-12 RX ORDER — IBUPROFEN 600 MG/1
600 TABLET ORAL EVERY 6 HOURS
Status: DISCONTINUED | OUTPATIENT
Start: 2022-08-12 | End: 2022-08-12

## 2022-08-12 RX ORDER — OXYCODONE AND ACETAMINOPHEN 10; 325 MG/1; MG/1
1 TABLET ORAL EVERY 6 HOURS PRN
Status: DISCONTINUED | OUTPATIENT
Start: 2022-08-12 | End: 2022-08-15 | Stop reason: HOSPADM

## 2022-08-12 RX ORDER — OXYTOCIN/RINGER'S LACTATE 30/500 ML
0-30 PLASTIC BAG, INJECTION (ML) INTRAVENOUS CONTINUOUS
Status: DISCONTINUED | OUTPATIENT
Start: 2022-08-12 | End: 2022-08-12

## 2022-08-12 RX ORDER — LABETALOL HCL 20 MG/4 ML
80 SYRINGE (ML) INTRAVENOUS ONCE AS NEEDED
Status: DISCONTINUED | OUTPATIENT
Start: 2022-08-12 | End: 2022-08-15 | Stop reason: HOSPADM

## 2022-08-12 RX ORDER — HYDRALAZINE HYDROCHLORIDE 20 MG/ML
5 INJECTION INTRAMUSCULAR; INTRAVENOUS ONCE AS NEEDED
Status: COMPLETED | OUTPATIENT
Start: 2022-08-12 | End: 2022-08-14

## 2022-08-12 RX ORDER — PHENYLEPHRINE HCL IN 0.9% NACL 1 MG/10 ML
100 SYRINGE (ML) INTRAVENOUS
Status: DISCONTINUED | OUTPATIENT
Start: 2022-08-12 | End: 2022-08-15 | Stop reason: HOSPADM

## 2022-08-12 RX ORDER — OXYCODONE AND ACETAMINOPHEN 5; 325 MG/1; MG/1
1 TABLET ORAL EVERY 6 HOURS PRN
Status: DISCONTINUED | OUTPATIENT
Start: 2022-08-12 | End: 2022-08-15 | Stop reason: HOSPADM

## 2022-08-12 RX ORDER — HYDRALAZINE HYDROCHLORIDE 20 MG/ML
10 INJECTION INTRAMUSCULAR; INTRAVENOUS ONCE AS NEEDED
Status: COMPLETED | OUTPATIENT
Start: 2022-08-12 | End: 2022-08-14

## 2022-08-12 RX ORDER — ONDANSETRON 4 MG/1
4 TABLET, ORALLY DISINTEGRATING ORAL EVERY 6 HOURS PRN
Status: DISCONTINUED | OUTPATIENT
Start: 2022-08-12 | End: 2022-08-15 | Stop reason: HOSPADM

## 2022-08-12 RX ORDER — SIMETHICONE 80 MG
1 TABLET,CHEWABLE ORAL EVERY 4 HOURS PRN
Status: DISCONTINUED | OUTPATIENT
Start: 2022-08-12 | End: 2022-08-15 | Stop reason: HOSPADM

## 2022-08-12 RX ORDER — ACETAMINOPHEN 325 MG/1
325 TABLET ORAL EVERY 4 HOURS PRN
Status: DISCONTINUED | OUTPATIENT
Start: 2022-08-12 | End: 2022-08-15 | Stop reason: HOSPADM

## 2022-08-12 RX ORDER — LABETALOL 100 MG/1
600 TABLET, FILM COATED ORAL EVERY 8 HOURS
Status: DISCONTINUED | OUTPATIENT
Start: 2022-08-12 | End: 2022-08-13

## 2022-08-12 RX ADMIN — Medication 2 MILLI-UNITS/MIN: at 08:08

## 2022-08-12 RX ADMIN — BUTORPHANOL TARTRATE 1 MG: 2 INJECTION, SOLUTION INTRAMUSCULAR; INTRAVENOUS at 03:08

## 2022-08-12 RX ADMIN — MAGNESIUM SULFATE IN WATER 2 G/HR: 40 INJECTION, SOLUTION INTRAVENOUS at 07:08

## 2022-08-12 RX ADMIN — MAGNESIUM SULFATE HEPTAHYDRATE 4 G: 40 INJECTION, SOLUTION INTRAVENOUS at 02:08

## 2022-08-12 RX ADMIN — IBUPROFEN 600 MG: 600 TABLET ORAL at 09:08

## 2022-08-12 RX ADMIN — SODIUM CHLORIDE, POTASSIUM CHLORIDE, SODIUM LACTATE AND CALCIUM CHLORIDE: 600; 310; 30; 20 INJECTION, SOLUTION INTRAVENOUS at 02:08

## 2022-08-12 RX ADMIN — LABETALOL HYDROCHLORIDE 600 MG: 100 TABLET, FILM COATED ORAL at 11:08

## 2022-08-12 RX ADMIN — LABETALOL HYDROCHLORIDE 40 MG: 5 INJECTION, SOLUTION INTRAVENOUS at 05:08

## 2022-08-12 RX ADMIN — HYDRALAZINE HYDROCHLORIDE 10 MG: 20 INJECTION, SOLUTION INTRAMUSCULAR; INTRAVENOUS at 09:08

## 2022-08-12 RX ADMIN — LABETALOL HYDROCHLORIDE 20 MG: 5 INJECTION, SOLUTION INTRAVENOUS at 04:08

## 2022-08-12 RX ADMIN — Medication 50 MCG: at 02:08

## 2022-08-12 RX ADMIN — IBUPROFEN 600 MG: 600 TABLET ORAL at 03:08

## 2022-08-12 RX ADMIN — LABETALOL HYDROCHLORIDE 600 MG: 100 TABLET, FILM COATED ORAL at 06:08

## 2022-08-12 NOTE — L&D DELIVERY NOTE
[facility] - Labor and Delivery  Delivery  Procedure Note    SUMMARY     Date of Procedure: 2022     Procedure: Spontaneous vaginal delivery    Obstetrician: Robbie lópez    Assistant: none    Pre-Delivery Diagnosis:  pregnancy <37 weeks, Severe Pre E    Post-Delivery Diagnosis: Living  infant(s)     Anesthesia: epidural    Episiotomy or Incision: none, small periurethral repaired                      Placenta and Cord:            Mechanism: spontaneous        Description: 3 vessel cord      Complications: none        Estimated Blood Loss (EBL): 200 mL            Condition: stable    Disposition: to MB    Attestation: I performed the procedure.

## 2022-08-13 PROBLEM — O14.10 SEVERE PRE-ECLAMPSIA: Status: ACTIVE | Noted: 2022-08-13

## 2022-08-13 LAB
RUBV IGG SERPL IA-ACNC: 3
RUBV IGG SERPL QL IA: POSITIVE

## 2022-08-13 PROCEDURE — 11000001 HC ACUTE MED/SURG PRIVATE ROOM

## 2022-08-13 PROCEDURE — 25000003 PHARM REV CODE 250: Performed by: STUDENT IN AN ORGANIZED HEALTH CARE EDUCATION/TRAINING PROGRAM

## 2022-08-13 RX ORDER — LABETALOL 200 MG/1
600 TABLET, FILM COATED ORAL
Status: DISCONTINUED | OUTPATIENT
Start: 2022-08-13 | End: 2022-08-15 | Stop reason: HOSPADM

## 2022-08-13 RX ADMIN — LABETALOL HYDROCHLORIDE 600 MG: 100 TABLET, FILM COATED ORAL at 02:08

## 2022-08-13 RX ADMIN — LABETALOL HYDROCHLORIDE 600 MG: 200 TABLET, FILM COATED ORAL at 10:08

## 2022-08-13 RX ADMIN — IBUPROFEN 600 MG: 600 TABLET ORAL at 07:08

## 2022-08-13 RX ADMIN — IBUPROFEN 600 MG: 600 TABLET ORAL at 02:08

## 2022-08-13 RX ADMIN — IBUPROFEN 600 MG: 600 TABLET ORAL at 08:08

## 2022-08-13 RX ADMIN — LABETALOL HYDROCHLORIDE 600 MG: 200 TABLET, FILM COATED ORAL at 06:08

## 2022-08-13 NOTE — PLAN OF CARE
"  Problem: Adult Inpatient Plan of Care  Goal: Plan of Care Review  Outcome: Ongoing, Progressing  Goal: Patient-Specific Goal (Individualized)  Outcome: Ongoing, Progressing  Flowsheets (Taken 2022 5871)  Individualized Care Needs: "eat regular food"  Goal: Absence of Hospital-Acquired Illness or Injury  Outcome: Ongoing, Progressing  Goal: Optimal Comfort and Wellbeing  Outcome: Ongoing, Progressing  Goal: Readiness for Transition of Care  Outcome: Ongoing, Progressing     Problem:  Fall Injury Risk  Goal: Absence of Fall, Infant Drop and Related Injury  Outcome: Ongoing, Progressing     Problem: Infection  Goal: Absence of Infection Signs and Symptoms  Outcome: Ongoing, Progressing     Problem: Bleeding (Labor)  Goal: Hemostasis  Outcome: Ongoing, Progressing     Problem: Change in Fetal Wellbeing (Labor)  Goal: Stable Fetal Wellbeing  Outcome: Ongoing, Progressing     Problem: Delayed Labor Progression (Labor)  Goal: Effective Progression to Delivery  Outcome: Ongoing, Progressing     Problem: Infection (Labor)  Goal: Absence of Infection Signs and Symptoms  Outcome: Ongoing, Progressing     Problem: Labor Pain (Labor)  Goal: Acceptable Pain Control  Outcome: Ongoing, Progressing     Problem: Uterine Tachysystole (Labor)  Goal: Normal Uterine Contraction Pattern  Outcome: Ongoing, Progressing     Problem: Pain Acute  Goal: Acceptable Pain Control and Functional Ability  Outcome: Ongoing, Progressing     Problem: Adjustment to Role Transition (Postpartum Vaginal Delivery)  Goal: Successful Maternal Role Transition  Outcome: Ongoing, Progressing     Problem: Bleeding (Postpartum Vaginal Delivery)  Goal: Hemostasis  Outcome: Ongoing, Progressing     Problem: Infection (Postpartum Vaginal Delivery)  Goal: Absence of Infection Signs/Symptoms  Outcome: Ongoing, Progressing     Problem: Pain (Postpartum Vaginal Delivery)  Goal: Acceptable Pain Control  Outcome: Ongoing, Progressing     Problem: Urinary " Retention (Postpartum Vaginal Delivery)  Goal: Effective Urinary Elimination  Outcome: Ongoing, Progressing     Problem: Breastfeeding  Goal: Effective Breastfeeding  Outcome: Ongoing, Progressing

## 2022-08-13 NOTE — PROGRESS NOTES
PPD1 from . IOL at 36 WGA for severe Pre E.  S/P 24 hour of Mg      AFVSS    Fundus firm, 1+ edema, Normal reflexes    Plan- adjust BP meds as needed.  Plan DC tomorrow

## 2022-08-14 PROCEDURE — 11000001 HC ACUTE MED/SURG PRIVATE ROOM

## 2022-08-14 PROCEDURE — 25000003 PHARM REV CODE 250: Performed by: STUDENT IN AN ORGANIZED HEALTH CARE EDUCATION/TRAINING PROGRAM

## 2022-08-14 PROCEDURE — 63600175 PHARM REV CODE 636 W HCPCS: Performed by: STUDENT IN AN ORGANIZED HEALTH CARE EDUCATION/TRAINING PROGRAM

## 2022-08-14 RX ORDER — NIFEDIPINE 30 MG/1
30 TABLET, EXTENDED RELEASE ORAL ONCE
Status: COMPLETED | OUTPATIENT
Start: 2022-08-14 | End: 2022-08-14

## 2022-08-14 RX ORDER — NIFEDIPINE 30 MG/1
30 TABLET, EXTENDED RELEASE ORAL DAILY
Status: DISCONTINUED | OUTPATIENT
Start: 2022-08-14 | End: 2022-08-14

## 2022-08-14 RX ORDER — HYDRALAZINE HYDROCHLORIDE 20 MG/ML
INJECTION INTRAMUSCULAR; INTRAVENOUS
Status: DISPENSED
Start: 2022-08-14 | End: 2022-08-14

## 2022-08-14 RX ORDER — CYCLOBENZAPRINE HCL 5 MG
5 TABLET ORAL 3 TIMES DAILY PRN
Status: DISCONTINUED | OUTPATIENT
Start: 2022-08-14 | End: 2022-08-15 | Stop reason: HOSPADM

## 2022-08-14 RX ORDER — NIFEDIPINE 30 MG/1
30 TABLET, EXTENDED RELEASE ORAL 2 TIMES DAILY
Status: DISCONTINUED | OUTPATIENT
Start: 2022-08-14 | End: 2022-08-15 | Stop reason: HOSPADM

## 2022-08-14 RX ADMIN — CYCLOBENZAPRINE HYDROCHLORIDE 5 MG: 5 TABLET, FILM COATED ORAL at 10:08

## 2022-08-14 RX ADMIN — HYDRALAZINE HYDROCHLORIDE 5 MG: 20 INJECTION, SOLUTION INTRAMUSCULAR; INTRAVENOUS at 01:08

## 2022-08-14 RX ADMIN — ACETAMINOPHEN 325MG 650 MG: 325 TABLET ORAL at 02:08

## 2022-08-14 RX ADMIN — ACETAMINOPHEN 325MG 650 MG: 325 TABLET ORAL at 12:08

## 2022-08-14 RX ADMIN — DOCUSATE SODIUM 200 MG: 100 CAPSULE, LIQUID FILLED ORAL at 07:08

## 2022-08-14 RX ADMIN — LABETALOL HYDROCHLORIDE 600 MG: 200 TABLET, FILM COATED ORAL at 03:08

## 2022-08-14 RX ADMIN — IBUPROFEN 600 MG: 600 TABLET ORAL at 07:08

## 2022-08-14 RX ADMIN — HYDRALAZINE HYDROCHLORIDE 10 MG: 20 INJECTION INTRAMUSCULAR; INTRAVENOUS at 06:08

## 2022-08-14 RX ADMIN — NIFEDIPINE 30 MG: 30 TABLET, FILM COATED, EXTENDED RELEASE ORAL at 09:08

## 2022-08-14 RX ADMIN — LABETALOL HYDROCHLORIDE 40 MG: 5 INJECTION, SOLUTION INTRAVENOUS at 05:08

## 2022-08-14 RX ADMIN — HYDRALAZINE HYDROCHLORIDE 10 MG: 20 INJECTION, SOLUTION INTRAMUSCULAR; INTRAVENOUS at 02:08

## 2022-08-14 RX ADMIN — NIFEDIPINE 30 MG: 30 TABLET, FILM COATED, EXTENDED RELEASE ORAL at 01:08

## 2022-08-14 RX ADMIN — LABETALOL HYDROCHLORIDE 20 MG: 5 INJECTION, SOLUTION INTRAVENOUS at 04:08

## 2022-08-14 RX ADMIN — LABETALOL HYDROCHLORIDE 600 MG: 200 TABLET, FILM COATED ORAL at 07:08

## 2022-08-14 NOTE — PLAN OF CARE
Problem: Breastfeeding  Goal: Effective Breastfeeding  Outcome: Ongoing, Progressing  Intervention: Promote Effective Breastfeeding  Flowsheets (Taken 8/13/2022 2050)  Breastfeeding Support:   assisted with latch   feeding session observed   assisted with positioning   electric breast pump used   hand expression verified   suck stimulated with breast milk  Intervention: Support Exclusive Breastfeed Success  Flowsheets (Taken 8/13/2022 2050)  Psychosocial Support:   counseling provided   questions encouraged/answered   care explained to patient/family prior to performing  Parent/Child Attachment Promotion:   participation in care promoted   cue recognition promoted

## 2022-08-14 NOTE — NURSING
"Pt called me to room to assist with breastfeeding. I informed her that I was helping another patient and would be in as soon as I can. CNA was performing vitals and let me know Pt pressure was 184/108 at 0047. Pt then stated "Are you really going to let me have a effing stroke to care about my pressures? You did not treat me for my previous pressure of 158/93 at 10pm" I then explained to patient that that 158/93 pressure did not meet our protocol to treat but I could recheck her pressure if she would like or give her medicine for her headache and she refused. I also informed her that her next vital sign check was going to be taken at 0000 and her labetalol 600mg was due at 0200. I asked her what I could do to make the situation better and she told me to "call the effing doctor and I'm not taking IV labetalol" I then called Dr. Emanuel and informed him of the situation and he gave me the order to treat with the hydralazine protocol first and to give a dose of procardia 30mg now then Q 24hrs ." Her pressures went down to he 150/80s and then at 0415 jumped back up to 175/96. I called Dr. Emanuel again to let him know of the spike and he told me to start with the labetalol protocol and then hydralazine if needed. The patient agreed to the treatment plan. Dr. Emanuel called around 0500 to check on the patient and I told him her pressures were still upper 170s/100. He told me to not give the 80mg of labetalol and to go ahead and give the 10 mg of hydralazine. The patient agreed and her pressures went below parameters. I called Dr. Emanuel with an update at 0715 and he asked to talk to the patient via phone.   "

## 2022-08-14 NOTE — PROGRESS NOTES
Spoke with RN about pressures. Pt refused labetalol dose and Bps have become severe. Will start IV meds per protocol and give dose of PO labetalol as scheduled. If iv meds do not control her BPs or if severe headache starts, will get crustal care involved. Did discuss with patient and  this morning that severe pre E HTN does not act like CHTN

## 2022-08-14 NOTE — PROGRESS NOTES
PPD2 from .  Pt with severe Pre E s/p Mg 24 hour post partum.  BP's were stable yesterday with occasional severe.  Pt and  were concerned the labetalol was not working so refused the Night time dose of labetalol. After this, pt needed multiple doses of IV meds to ge this down.  Procardia 30 added to regimen last night as well.  Asymptomatic this morning. Spoke with Pt about Labetalol and rapidly stopping this medicine could cause BP's to continue to spike.  They verbalized understanding that for now the best course of action is to continue Labetalol 600 q8 and add on procardiaxl 60 daily and can ween over the next few weeks.  Aware that not taking oral meds can cause the need for multiple IV pushes.      AFVSS other than BP's.  Reflexes normal      Plan adjust BP meds as nessessary.  IV meds in between inf needed.  If need for continuous IV pushed will consider escalating level of care.  Pt stable at this time.

## 2022-08-14 NOTE — PLAN OF CARE
Problem: Adult Inpatient Plan of Care  Goal: Plan of Care Review  Outcome: Ongoing, Progressing  Goal: Patient-Specific Goal (Individualized)  Outcome: Ongoing, Progressing  Goal: Absence of Hospital-Acquired Illness or Injury  Outcome: Ongoing, Progressing  Goal: Optimal Comfort and Wellbeing  Outcome: Ongoing, Progressing  Goal: Readiness for Transition of Care  Outcome: Ongoing, Progressing     Problem:  Fall Injury Risk  Goal: Absence of Fall, Infant Drop and Related Injury  Outcome: Ongoing, Progressing     Problem: Infection  Goal: Absence of Infection Signs and Symptoms  Outcome: Ongoing, Progressing     Problem: Bleeding (Labor)  Goal: Hemostasis  Outcome: Ongoing, Progressing     Problem: Change in Fetal Wellbeing (Labor)  Goal: Stable Fetal Wellbeing  Outcome: Ongoing, Progressing     Problem: Delayed Labor Progression (Labor)  Goal: Effective Progression to Delivery  Outcome: Ongoing, Progressing     Problem: Infection (Labor)  Goal: Absence of Infection Signs and Symptoms  Outcome: Ongoing, Progressing     Problem: Labor Pain (Labor)  Goal: Acceptable Pain Control  Outcome: Ongoing, Progressing     Problem: Uterine Tachysystole (Labor)  Goal: Normal Uterine Contraction Pattern  Outcome: Ongoing, Progressing     Problem: Pain Acute  Goal: Acceptable Pain Control and Functional Ability  Outcome: Ongoing, Progressing     Problem: Adjustment to Role Transition (Postpartum Vaginal Delivery)  Goal: Successful Maternal Role Transition  Outcome: Ongoing, Progressing     Problem: Bleeding (Postpartum Vaginal Delivery)  Goal: Hemostasis  Outcome: Ongoing, Progressing     Problem: Infection (Postpartum Vaginal Delivery)  Goal: Absence of Infection Signs/Symptoms  Outcome: Ongoing, Progressing     Problem: Pain (Postpartum Vaginal Delivery)  Goal: Acceptable Pain Control  Outcome: Ongoing, Progressing     Problem: Urinary Retention (Postpartum Vaginal Delivery)  Goal: Effective Urinary  Elimination  Outcome: Ongoing, Progressing     Problem: Breastfeeding  Goal: Effective Breastfeeding  Outcome: Ongoing, Progressing

## 2022-08-15 VITALS
HEART RATE: 84 BPM | RESPIRATION RATE: 19 BRPM | TEMPERATURE: 99 F | DIASTOLIC BLOOD PRESSURE: 98 MMHG | SYSTOLIC BLOOD PRESSURE: 151 MMHG | OXYGEN SATURATION: 100 %

## 2022-08-15 PROCEDURE — 25000003 PHARM REV CODE 250: Performed by: STUDENT IN AN ORGANIZED HEALTH CARE EDUCATION/TRAINING PROGRAM

## 2022-08-15 RX ORDER — NIFEDIPINE 30 MG/1
30 TABLET, EXTENDED RELEASE ORAL 2 TIMES DAILY
Qty: 60 TABLET | Refills: 0 | Status: SHIPPED | OUTPATIENT
Start: 2022-08-15 | End: 2023-11-13

## 2022-08-15 RX ORDER — LABETALOL 300 MG/1
600 TABLET, FILM COATED ORAL EVERY 8 HOURS
Qty: 180 TABLET | Refills: 0 | Status: SHIPPED | OUTPATIENT
Start: 2022-08-15 | End: 2022-11-17

## 2022-08-15 RX ORDER — IBUPROFEN 600 MG/1
600 TABLET ORAL 3 TIMES DAILY
Qty: 30 TABLET | Refills: 0 | Status: SHIPPED | OUTPATIENT
Start: 2022-08-15 | End: 2022-11-17

## 2022-08-15 RX ORDER — OXYCODONE AND ACETAMINOPHEN 5; 325 MG/1; MG/1
1 TABLET ORAL EVERY 4 HOURS PRN
Qty: 12 TABLET | Refills: 0 | Status: SHIPPED | OUTPATIENT
Start: 2022-08-15 | End: 2022-11-17

## 2022-08-15 RX ADMIN — IBUPROFEN 600 MG: 600 TABLET ORAL at 07:08

## 2022-08-15 RX ADMIN — NIFEDIPINE 30 MG: 30 TABLET, FILM COATED, EXTENDED RELEASE ORAL at 08:08

## 2022-08-15 RX ADMIN — LABETALOL HYDROCHLORIDE 600 MG: 200 TABLET, FILM COATED ORAL at 07:08

## 2022-08-15 NOTE — DISCHARGE SUMMARY
"[facility] - 2nd Floor Mother/Baby Unit  Obstetrics  Discharge Summary      Patient Name: Tawnya Love  MRN: [id number]  Admission Date: 8/11/2022  Hospital Length of Stay: 4 days  Discharge Date and Time:  08/15/2022 9:41 AM  Attending Physician: Robbie Emanuel MD   Discharging Provider: Robbie Emanuel MD   Primary Care Provider: Shawna Stewart MD    HPI: Admitted at 36 weeks with increasing BP's despite multiple medication adjustments.      * No surgery found *     Hospital Course:   IOL and vaginal delivery without difficulty.  Was on Mg during labor and PP 24 hours.  Did well on labetalol 600 TID but refused dose one night and had to be covered with IV meds.  Procardia 30XL bid added to regimen and BP"s have been stable and pt asymptomatic       Final Active Diagnoses:    Diagnosis Date Noted POA    PRINCIPAL PROBLEM:  Severe pre-eclampsia [O14.10] 08/13/2022 Yes      Problems Resolved During this Admission:        Significant Diagnostic Studies: Labs: All labs within the past 24 hours have been reviewed          Immunizations     Date Immunization Status Dose Route/Site Given by    08/12/22 1238 MMR Incomplete 0.5 mL Subcutaneous/     08/12/22 1238 Tdap Incomplete 0.5 mL Intramuscular/           Delivery:    Episiotomy: None   Lacerations: None   Repair suture:     Repair # of packets: 1   Blood loss (ml):       Birth information:  YOB: 2020   Time of birth: 10:43 AM   Sex: male   Delivery type: Vaginal, Spontaneous   Gestational Age: 36w1d    Delivery Clinician:      Other providers:       Additional  information:  Forceps:    Vacuum:    Breech:    Observed anomalies      Living?:           APGARS  One minute Five minutes Ten minutes   Skin color:         Heart rate:         Grimace:         Muscle tone:         Breathing:         Totals: 8  9        Placenta: Delivered:       appearance    Pending Diagnostic Studies:     None          Discharged Condition: " good    Disposition: Home or Self Care    Follow Up:   Follow-up Information     Robbie Emanuel MD. Schedule an appointment as soon as possible for a visit.    Specialty: Obstetrics and Gynecology  Why: Follow up in 4-6 weeks.  Contact information:  [address] [address]  [phone]                       Patient Instructions:      Diet Adult Regular     No dressing needed     Notify your health care provider if you experience any of the following:  temperature >100.4     Notify your health care provider if you experience any of the following:  persistent nausea and vomiting or diarrhea     Notify your health care provider if you experience any of the following:  severe uncontrolled pain     Notify your health care provider if you experience any of the following:  redness, tenderness, or signs of infection (pain, swelling, redness, odor or green/yellow discharge around incision site)     Notify your health care provider if you experience any of the following:  difficulty breathing or increased cough     Notify your health care provider if you experience any of the following:  severe persistent headache     Notify your health care provider if you experience any of the following:  worsening rash     Notify your health care provider if you experience any of the following:  persistent dizziness, light-headedness, or visual disturbances     Notify your health care provider if you experience any of the following:  increased confusion or weakness     Notify your health care provider if you experience any of the following:     Pelvic Rest     Medications:  Current Discharge Medication List      START taking these medications    Details   ibuprofen (ADVIL,MOTRIN) 600 MG tablet Take 1 tablet (600 mg total) by mouth 3 (three) times daily.  Qty: 30 tablet, Refills: 0      labetaloL (NORMODYNE) 300 MG tablet Take 2 tablets (600 mg total) by mouth every 8 (eight) hours.  Qty: 180 tablet,  Refills: 0    Comments: .      NIFEdipine (PROCARDIA-XL) 30 MG (OSM) 24 hr tablet Take 1 tablet (30 mg total) by mouth 2 (two) times daily.  Qty: 60 tablet, Refills: 0    Comments: .      oxyCODONE-acetaminophen (PERCOCET) 5-325 mg per tablet Take 1 tablet by mouth every 4 (four) hours as needed for Pain.  Qty: 12 tablet, Refills: 0    Comments: Quantity prescribed more than 7 day supply? No         CONTINUE these medications which have NOT CHANGED    Details   levothyroxine (SYNTHROID) 25 MCG tablet Take 25 mcg by mouth once daily.         STOP taking these medications       aspirin 81 MG Chew Comments:   Reason for Stopping:         metFORMIN (GLUCOPHAGE) 1000 MG tablet Comments:   Reason for Stopping:         NOVOLIN N NPH U-100 INSULIN 100 unit/mL injection Comments:   Reason for Stopping:         progesterone (PROMETRIUM) 200 MG capsule Comments:   Reason for Stopping:               Robbie Emanuel MD  Obstetrics  [facility] - 2nd Floor Mother/Baby Unit

## 2022-08-17 ENCOUNTER — PATIENT OUTREACH (OUTPATIENT)
Dept: ADMINISTRATIVE | Facility: CLINIC | Age: 37
End: 2022-08-17
Payer: COMMERCIAL

## 2022-08-17 NOTE — PROGRESS NOTES
C3 nurse spoke with Tawnya Love for a TCC post hospital discharge follow up call, call completed. The patient has a scheduled HOSFU appointment with Robbie Emanuel on 8/19/22.

## 2022-11-10 ENCOUNTER — TELEPHONE (OUTPATIENT)
Dept: INTERNAL MEDICINE | Facility: CLINIC | Age: 37
End: 2022-11-10
Payer: COMMERCIAL

## 2022-11-16 RX ORDER — BLOOD-GLUCOSE CONTROL, NORMAL
EACH MISCELLANEOUS
COMMUNITY
Start: 2022-06-22 | End: 2022-11-17

## 2022-11-16 RX ORDER — CALCIUM CITRATE/VITAMIN D3 200MG-6.25
1 TABLET ORAL 4 TIMES DAILY
COMMUNITY
Start: 2022-08-18 | End: 2022-11-17

## 2022-11-17 ENCOUNTER — OFFICE VISIT (OUTPATIENT)
Dept: INTERNAL MEDICINE | Facility: CLINIC | Age: 37
End: 2022-11-17
Payer: COMMERCIAL

## 2022-11-17 ENCOUNTER — LAB VISIT (OUTPATIENT)
Dept: LAB | Facility: HOSPITAL | Age: 37
End: 2022-11-17
Payer: COMMERCIAL

## 2022-11-17 VITALS
BODY MASS INDEX: 30.51 KG/M2 | OXYGEN SATURATION: 99 % | DIASTOLIC BLOOD PRESSURE: 76 MMHG | RESPIRATION RATE: 18 BRPM | HEART RATE: 86 BPM | SYSTOLIC BLOOD PRESSURE: 128 MMHG | HEIGHT: 63 IN | TEMPERATURE: 97 F | WEIGHT: 172.19 LBS

## 2022-11-17 DIAGNOSIS — Z00.00 ROUTINE GENERAL MEDICAL EXAMINATION AT A HEALTH CARE FACILITY: Primary | ICD-10-CM

## 2022-11-17 DIAGNOSIS — I10 ESSENTIAL (PRIMARY) HYPERTENSION: ICD-10-CM

## 2022-11-17 DIAGNOSIS — Z00.00 WELL ADULT EXAM: Chronic | ICD-10-CM

## 2022-11-17 DIAGNOSIS — E78.2 MIXED HYPERLIPIDEMIA: ICD-10-CM

## 2022-11-17 DIAGNOSIS — Z00.00 WELLNESS EXAMINATION: Primary | ICD-10-CM

## 2022-11-17 DIAGNOSIS — I10 ESSENTIAL HYPERTENSION, MALIGNANT: ICD-10-CM

## 2022-11-17 DIAGNOSIS — R79.89 HYPOURICEMIA: ICD-10-CM

## 2022-11-17 DIAGNOSIS — E78.2 MIXED HYPERLIPIDEMIA: Chronic | ICD-10-CM

## 2022-11-17 DIAGNOSIS — E88.810 DYSMETABOLIC SYNDROME X: ICD-10-CM

## 2022-11-17 DIAGNOSIS — E78.5 HYPERLIPIDEMIA, UNSPECIFIED HYPERLIPIDEMIA TYPE: ICD-10-CM

## 2022-11-17 LAB
ALBUMIN SERPL-MCNC: 4.1 GM/DL (ref 3.5–5)
ALBUMIN/GLOB SERPL: 1.4 RATIO (ref 1.1–2)
ALP SERPL-CCNC: 86 UNIT/L (ref 40–150)
ALT SERPL-CCNC: 12 UNIT/L (ref 0–55)
AST SERPL-CCNC: 11 UNIT/L (ref 5–34)
BASOPHILS # BLD AUTO: 0.02 X10(3)/MCL (ref 0–0.2)
BASOPHILS NFR BLD AUTO: 0.3 %
BILIRUBIN DIRECT+TOT PNL SERPL-MCNC: 0.6 MG/DL
BUN SERPL-MCNC: 11.8 MG/DL (ref 7–18.7)
CALCIUM SERPL-MCNC: 9.6 MG/DL (ref 8.4–10.2)
CHLORIDE SERPL-SCNC: 106 MMOL/L (ref 98–107)
CHOLEST SERPL-MCNC: 232 MG/DL
CHOLEST/HDLC SERPL: 5 {RATIO} (ref 0–5)
CO2 SERPL-SCNC: 26 MMOL/L (ref 22–29)
CREAT SERPL-MCNC: 0.85 MG/DL (ref 0.55–1.02)
DEPRECATED CALCIDIOL+CALCIFEROL SERPL-MC: 34.9 NG/ML (ref 30–80)
EOSINOPHIL # BLD AUTO: 0.07 X10(3)/MCL (ref 0–0.9)
EOSINOPHIL NFR BLD AUTO: 1.2 %
ERYTHROCYTE [DISTWIDTH] IN BLOOD BY AUTOMATED COUNT: 14.9 % (ref 11.5–17)
EST. AVERAGE GLUCOSE BLD GHB EST-MCNC: 105.4 MG/DL
GFR SERPLBLD CREATININE-BSD FMLA CKD-EPI: >60 MLS/MIN/1.73/M2
GLOBULIN SER-MCNC: 3 GM/DL (ref 2.4–3.5)
GLUCOSE SERPL-MCNC: 87 MG/DL (ref 74–100)
HBA1C MFR BLD: 5.3 %
HCT VFR BLD AUTO: 42.2 % (ref 37–47)
HDLC SERPL-MCNC: 50 MG/DL (ref 35–60)
HGB BLD-MCNC: 13 GM/DL (ref 12–16)
IMM GRANULOCYTES # BLD AUTO: 0.01 X10(3)/MCL (ref 0–0.04)
IMM GRANULOCYTES NFR BLD AUTO: 0.2 %
LDLC SERPL CALC-MCNC: 164 MG/DL (ref 50–140)
LYMPHOCYTES # BLD AUTO: 2.06 X10(3)/MCL (ref 0.6–4.6)
LYMPHOCYTES NFR BLD AUTO: 35.6 %
MCH RBC QN AUTO: 26.8 PG (ref 27–31)
MCHC RBC AUTO-ENTMCNC: 30.8 MG/DL (ref 33–36)
MCV RBC AUTO: 87 FL (ref 80–94)
MONOCYTES # BLD AUTO: 0.54 X10(3)/MCL (ref 0.1–1.3)
MONOCYTES NFR BLD AUTO: 9.3 %
NEUTROPHILS # BLD AUTO: 3.1 X10(3)/MCL (ref 2.1–9.2)
NEUTROPHILS NFR BLD AUTO: 53.4 %
NRBC BLD AUTO-RTO: 0 %
PLATELET # BLD AUTO: 289 X10(3)/MCL (ref 130–400)
PMV BLD AUTO: 10.4 FL (ref 7.4–10.4)
POTASSIUM SERPL-SCNC: 4.1 MMOL/L (ref 3.5–5.1)
PROT SERPL-MCNC: 7.1 GM/DL (ref 6.4–8.3)
RBC # BLD AUTO: 4.85 X10(6)/MCL (ref 4.2–5.4)
SODIUM SERPL-SCNC: 140 MMOL/L (ref 136–145)
TRIGL SERPL-MCNC: 89 MG/DL (ref 37–140)
TSH SERPL-ACNC: 0.98 UIU/ML (ref 0.35–4.94)
VLDLC SERPL CALC-MCNC: 18 MG/DL
WBC # SPEC AUTO: 5.8 X10(3)/MCL (ref 4.5–11.5)

## 2022-11-17 PROCEDURE — 1159F PR MEDICATION LIST DOCUMENTED IN MEDICAL RECORD: ICD-10-PCS | Mod: CPTII,,,

## 2022-11-17 PROCEDURE — 3008F PR BODY MASS INDEX (BMI) DOCUMENTED: ICD-10-PCS | Mod: CPTII,,,

## 2022-11-17 PROCEDURE — 99395 PR PREVENTIVE VISIT,EST,18-39: ICD-10-PCS | Mod: ,,,

## 2022-11-17 PROCEDURE — 80053 COMPREHEN METABOLIC PANEL: CPT

## 2022-11-17 PROCEDURE — 3074F SYST BP LT 130 MM HG: CPT | Mod: CPTII,,,

## 2022-11-17 PROCEDURE — 1159F MED LIST DOCD IN RCRD: CPT | Mod: CPTII,,,

## 2022-11-17 PROCEDURE — 85025 COMPLETE CBC W/AUTO DIFF WBC: CPT

## 2022-11-17 PROCEDURE — 3078F PR MOST RECENT DIASTOLIC BLOOD PRESSURE < 80 MM HG: ICD-10-PCS | Mod: CPTII,,,

## 2022-11-17 PROCEDURE — 83036 HEMOGLOBIN GLYCOSYLATED A1C: CPT

## 2022-11-17 PROCEDURE — 80061 LIPID PANEL: CPT

## 2022-11-17 PROCEDURE — 3008F BODY MASS INDEX DOCD: CPT | Mod: CPTII,,,

## 2022-11-17 PROCEDURE — 3078F DIAST BP <80 MM HG: CPT | Mod: CPTII,,,

## 2022-11-17 PROCEDURE — 1160F RVW MEDS BY RX/DR IN RCRD: CPT | Mod: CPTII,,,

## 2022-11-17 PROCEDURE — 1160F PR REVIEW ALL MEDS BY PRESCRIBER/CLIN PHARMACIST DOCUMENTED: ICD-10-PCS | Mod: CPTII,,,

## 2022-11-17 PROCEDURE — 82306 VITAMIN D 25 HYDROXY: CPT

## 2022-11-17 PROCEDURE — 99395 PREV VISIT EST AGE 18-39: CPT | Mod: ,,,

## 2022-11-17 PROCEDURE — 3074F PR MOST RECENT SYSTOLIC BLOOD PRESSURE < 130 MM HG: ICD-10-PCS | Mod: CPTII,,,

## 2022-11-17 PROCEDURE — 36415 COLL VENOUS BLD VENIPUNCTURE: CPT

## 2022-11-17 PROCEDURE — 84443 ASSAY THYROID STIM HORMONE: CPT

## 2022-11-17 RX ORDER — LABETALOL 100 MG/1
100 TABLET, FILM COATED ORAL EVERY 8 HOURS
COMMUNITY
Start: 2022-08-10 | End: 2022-11-17

## 2022-11-17 NOTE — PROGRESS NOTES
Patient ID: Tawnya Love is a 37 y.o. female.    Chief Complaint: Annual Exam    37-year-old female here today for wellness visit.  Medical comorbidities include hypertension, hyperlipidemia, insulin resistance, PCOS and low vitamin D levels.  Since last visit patient has not fairly well without any acute injury or major illness.  Is now a happy New mom with her  1st baby. Reports currently still breastfeeding.    Blood pressure today noted well-controlled currently on nifedipine.  Of note patient has had significant weight loss since last visit likely contributing to her  better blood pressure control.   Wellness labs reviewed and essentially normal.  Did have elevated lipid profile, for which we will extend her lifestyle modification trial since has made some dietary changes with weight loss.  Age-appropriate screenings reviewed and up-to-date.  Immunizations reviewed and discussed with patient however wishing to hold off for now.  Otherwise no other acute medical concerns today.     GYN: Dr. Briggs   Cardiology: Dr. Kwan   Dermatology: Dr. Reynoso     Wellness: Level 2021      MEDICAL HISTORY:    Past Medical History:   Diagnosis Date    Essential (primary) hypertension     Mixed hyperlipidemia     Personal history of gestational diabetes      labor in second trimester with  delivery in second trimester     Thyroid disease     Well adult exam       Past Surgical History:   Procedure Laterality Date    CERVICAL CERCLAGE N/A 2022    DILATION AND CURETTAGE OF UTERUS        Social History     Tobacco Use    Smoking status: Never    Smokeless tobacco: Never   Substance Use Topics    Alcohol use: Not Currently    Drug use: Never          Health Maintenance Due   Topic Date Due    Hepatitis C Screening  Never done    COVID-19 Vaccine (1) Never done    TETANUS VACCINE  2012    Influenza Vaccine (1) Never done          Patient Care Team:  hSawna Stewart MD as PCP - General  "(Internal Medicine)      Review of Systems   Constitutional:  Negative for fatigue and fever.   HENT:  Negative for congestion, rhinorrhea, sore throat and trouble swallowing.    Eyes:  Negative for redness and visual disturbance.   Respiratory:  Negative for cough, chest tightness and shortness of breath.    Cardiovascular:  Negative for chest pain and palpitations.   Gastrointestinal:  Negative for abdominal pain, constipation, diarrhea, nausea and vomiting.   Genitourinary:  Negative for dysuria, flank pain, frequency and urgency.   Musculoskeletal:  Negative for arthralgias, gait problem and myalgias.   Skin:  Negative for rash and wound.   Neurological:  Negative for facial asymmetry, speech difficulty, weakness and headaches.   All other systems reviewed and are negative.    Objective:   /76 (BP Location: Right arm)   Pulse 86   Temp 97.4 °F (36.3 °C)   Resp 18   Ht 5' 3" (1.6 m)   Wt 78.1 kg (172 lb 3.2 oz)   SpO2 99%   Breastfeeding Yes   BMI 30.50 kg/m²      Physical Exam  Constitutional:       General: She is not in acute distress.     Appearance: Normal appearance.   HENT:      Right Ear: Tympanic membrane, ear canal and external ear normal.      Left Ear: Tympanic membrane, ear canal and external ear normal.      Nose: Nose normal.      Mouth/Throat:      Mouth: Mucous membranes are moist.      Pharynx: Oropharynx is clear.   Eyes:      Extraocular Movements: Extraocular movements intact.      Conjunctiva/sclera: Conjunctivae normal.      Pupils: Pupils are equal, round, and reactive to light.   Cardiovascular:      Rate and Rhythm: Normal rate and regular rhythm.      Pulses: Normal pulses.      Heart sounds: Normal heart sounds. No murmur heard.    No gallop.   Pulmonary:      Effort: Pulmonary effort is normal.      Breath sounds: Normal breath sounds. No wheezing.   Abdominal:      General: Bowel sounds are normal. There is no distension.      Palpations: Abdomen is soft. There is no " mass.      Tenderness: There is no abdominal tenderness. There is no guarding.   Musculoskeletal:         General: Normal range of motion.   Skin:     General: Skin is warm and dry.   Neurological:      Mental Status: She is alert. Mental status is at baseline.      Sensory: No sensory deficit.      Motor: No weakness.         Assessment:       ICD-10-CM ICD-9-CM   1. Wellness examination  Z00.00 V70.0   2. Essential (primary) hypertension  I10 401.9   3. Mixed hyperlipidemia  E78.2 272.2   4. Well adult exam  Z00.00 V70.0        Plan:     Problem List Items Addressed This Visit          Cardiac/Vascular    Essential (primary) hypertension (Chronic)      - in office /76   -Stable   -currently well controlled on nifedipine , continue  -low-sodium diet         Mixed hyperlipidemia (Chronic)     Lab Results   Component Value Date    CHOL 232 (H) 11/17/2022    CHOL 196 10/29/2021    CHOL 224 (H) 05/12/2021     Lab Results   Component Value Date    HDL 50 11/17/2022    HDL 29 (L) 10/29/2021    HDL 34 (L) 05/12/2021   No results found for: LDLCALC  Lab Results   Component Value Date    TRIG 89 11/17/2022    TRIG 138 10/29/2021    TRIG 105 05/12/2021     - has made some lifestyle modification with weight loss  -Encouraged to continue lifestyle modification with low-cholesterol diet avoiding fatty and fried foods   -Encourage some form of routine aerobic exercise for continued weight loss            Other    Well adult exam (Chronic)     -patient feeling generally well today  - wellness labs reviewed and essentially stable, elevated cholesterol noted  -age-appropriate screenings up-to-date  -immunizations  Reviewed, however patient wishes to hold off for now  -encourage routine aerobic exercise 2 to 3 times a week  -increase fluid hydration            Other Visit Diagnoses       Wellness examination    -  Primary               Follow up in about 1 year (around 11/17/2023) for Wellness with labs prior to visit.   -plan  specifics discussed above          Medication List with Changes/Refills   Current Medications    NIFEDIPINE (PROCARDIA-XL) 30 MG (OSM) 24 HR TABLET    Take 1 tablet (30 mg total) by mouth 2 (two) times daily.   Discontinued Medications    IBUPROFEN (ADVIL,MOTRIN) 600 MG TABLET    Take 1 tablet (600 mg total) by mouth 3 (three) times daily.    LABETALOL (NORMODYNE) 100 MG TABLET    Take 100 mg by mouth every 8 (eight) hours.    LABETALOL (NORMODYNE) 300 MG TABLET    Take 2 tablets (600 mg total) by mouth every 8 (eight) hours.    LANCETS 30 GAUGE MISC    USE 1 LANCET FOUR TIMES A DAY AS DIRECTED    LEVOTHYROXINE (SYNTHROID) 25 MCG TABLET    Take 25 mcg by mouth once daily.    OXYCODONE-ACETAMINOPHEN (PERCOCET) 5-325 MG PER TABLET    Take 1 tablet by mouth every 4 (four) hours as needed for Pain.    TRUE METRIX GLUCOSE TEST STRIP STRP    1 strip 4 (four) times daily.

## 2022-11-18 PROBLEM — O14.10 SEVERE PRE-ECLAMPSIA: Status: RESOLVED | Noted: 2022-08-13 | Resolved: 2022-11-18

## 2022-11-18 PROBLEM — I10 ESSENTIAL (PRIMARY) HYPERTENSION: Chronic | Status: ACTIVE | Noted: 2022-08-10

## 2022-11-18 PROBLEM — I10 ESSENTIAL (PRIMARY) HYPERTENSION: Status: ACTIVE | Noted: 2022-08-10

## 2022-11-18 NOTE — ASSESSMENT & PLAN NOTE
-patient feeling generally well today  - wellness labs reviewed and essentially stable, elevated cholesterol noted  -age-appropriate screenings up-to-date  -immunizations  Reviewed, however patient wishes to hold off for now  -encourage routine aerobic exercise 2 to 3 times a week  -increase fluid hydration

## 2022-11-18 NOTE — ASSESSMENT & PLAN NOTE
- in office /76   -Stable   -currently well controlled on nifedipine , continue  -low-sodium diet

## 2022-11-18 NOTE — ASSESSMENT & PLAN NOTE
Lab Results   Component Value Date    CHOL 232 (H) 11/17/2022    CHOL 196 10/29/2021    CHOL 224 (H) 05/12/2021     Lab Results   Component Value Date    HDL 50 11/17/2022    HDL 29 (L) 10/29/2021    HDL 34 (L) 05/12/2021     No results found for: LDLCALC  Lab Results   Component Value Date    TRIG 89 11/17/2022    TRIG 138 10/29/2021    TRIG 105 05/12/2021       - has made some lifestyle modification with weight loss  -Encouraged to continue lifestyle modification with low-cholesterol diet avoiding fatty and fried foods   -Encourage some form of routine aerobic exercise for continued weight loss

## 2022-12-07 ENCOUNTER — OFFICE VISIT (OUTPATIENT)
Dept: INTERNAL MEDICINE | Facility: CLINIC | Age: 37
End: 2022-12-07
Payer: COMMERCIAL

## 2022-12-07 DIAGNOSIS — J06.9 BACTERIAL URI: Primary | ICD-10-CM

## 2022-12-07 DIAGNOSIS — B96.89 BACTERIAL URI: Primary | ICD-10-CM

## 2022-12-07 PROCEDURE — 1159F MED LIST DOCD IN RCRD: CPT | Mod: CPTII,95,,

## 2022-12-07 PROCEDURE — 1159F PR MEDICATION LIST DOCUMENTED IN MEDICAL RECORD: ICD-10-PCS | Mod: CPTII,95,,

## 2022-12-07 PROCEDURE — 1160F PR REVIEW ALL MEDS BY PRESCRIBER/CLIN PHARMACIST DOCUMENTED: ICD-10-PCS | Mod: CPTII,95,,

## 2022-12-07 PROCEDURE — 99213 OFFICE O/P EST LOW 20 MIN: CPT | Mod: 95,,,

## 2022-12-07 PROCEDURE — 99213 PR OFFICE/OUTPT VISIT, EST, LEVL III, 20-29 MIN: ICD-10-PCS | Mod: 95,,,

## 2022-12-07 PROCEDURE — 1160F RVW MEDS BY RX/DR IN RCRD: CPT | Mod: CPTII,95,,

## 2022-12-07 RX ORDER — AMOXICILLIN AND CLAVULANATE POTASSIUM 875; 125 MG/1; MG/1
1 TABLET, FILM COATED ORAL EVERY 12 HOURS
Qty: 14 TABLET | Refills: 0 | Status: SHIPPED | OUTPATIENT
Start: 2022-12-07 | End: 2022-12-14

## 2022-12-07 NOTE — PROGRESS NOTES
Patient ID: Tawnya Love is a 37 y.o. female.    Chief Complaint: No chief complaint on file.      The patient location is: Home  Visit type: audiovisual    Each patient to whom he or she provides medical services by telemedicine is:  (1) informed of the relationship between the physician and patient and the respective role of any other health care provider with respect to management of the patient; and (2) notified that he or she may decline to receive medical services by telemedicine and may withdraw from such care at any time.    37-year-old female here today for wellness visit.  Medical comorbidities include hypertension, hyperlipidemia, insulin resistance, PCOS and low vitamin D levels.  Today with complaints of upper respiratory symptoms times 10-14 days, see form below.  Not currently taking any OTC medication to help treat due to concerns since still currently breastfeeding.eat symptomatically, however due to duration of symptoms will send in amoxicillin for patient.  Per patient, will attempt to bear with symptoms and treat symptomatically, however will utilize amoxicillin and persistent despite symptomatically treating.  Otherwise no other acute medical concerns today.      GYN: Dr. Briggs   Cardiology: Dr. Kwan   Dermatology: Dr. Edgardo AMIN   This is a new problem. The current episode started 1 to 4 weeks ago. The problem has been gradually improving. There has been no fever. Associated symptoms include congestion, headaches and a sore throat. Pertinent negatives include no nausea, rhinorrhea, sinus pain, sneezing, vomiting or wheezing. She has tried nothing for the symptoms.     MEDICAL HISTORY:    Past Medical History:   Diagnosis Date    Essential (primary) hypertension     Mixed hyperlipidemia     Personal history of gestational diabetes      labor in second trimester with  delivery in second trimester     Thyroid disease     Well adult exam       Past Surgical History:    Procedure Laterality Date    CERVICAL CERCLAGE N/A 03/17/2022    DILATION AND CURETTAGE OF UTERUS        Social History     Tobacco Use    Smoking status: Never    Smokeless tobacco: Never   Substance Use Topics    Alcohol use: Not Currently    Drug use: Never          Health Maintenance Due   Topic Date Due    Hepatitis C Screening  Never done    COVID-19 Vaccine (1) Never done    TETANUS VACCINE  08/16/2012    Influenza Vaccine (1) Never done          Patient Care Team:  Shawna Stewart MD as PCP - General (Internal Medicine)      Review of Systems   Constitutional:  Negative for chills, fatigue and fever.   HENT:  Positive for congestion and sore throat. Negative for rhinorrhea, sinus pain and sneezing.    Respiratory:  Negative for wheezing.    Gastrointestinal:  Negative for nausea and vomiting.   Neurological:  Positive for headaches.     Objective:   There were no vitals taken for this visit.     Physical Exam      **No physical exam completed due to telemedicine format    Assessment:       ICD-10-CM ICD-9-CM   1. Bacterial URI  J06.9 465.9    B96.89 041.9        Plan:     Problem List Items Addressed This Visit          ENT    Bacterial URI - Primary     -symptoms 10-14 days   -currently breastfeeding, initiate amoxicillin  -treat symptomatically (OTC antihistamine, Flonase, once the cough syrup)         Relevant Medications    amoxicillin-clavulanate 875-125mg (AUGMENTIN) 875-125 mg per tablet          No follow-ups on file.   -plan specifics discussed above    Orders Placed This Encounter    amoxicillin-clavulanate 875-125mg (AUGMENTIN) 875-125 mg per tablet        Medication List with Changes/Refills   New Medications    AMOXICILLIN-CLAVULANATE 875-125MG (AUGMENTIN) 875-125 MG PER TABLET    Take 1 tablet by mouth every 12 (twelve) hours. for 7 days   Current Medications    NIFEDIPINE (PROCARDIA-XL) 30 MG (OSM) 24 HR TABLET    Take 1 tablet (30 mg total) by mouth 2 (two) times daily.

## 2022-12-07 NOTE — ASSESSMENT & PLAN NOTE
-symptoms 10-14 days   -currently breastfeeding, initiate amoxicillin  -treat symptomatically (OTC antihistamine, Flonase, once the cough syrup)

## 2023-01-07 ENCOUNTER — DOCUMENTATION ONLY (OUTPATIENT)
Dept: FAMILY MEDICINE | Facility: CLINIC | Age: 38
End: 2023-01-07
Payer: COMMERCIAL

## 2023-08-14 ENCOUNTER — LAB VISIT (OUTPATIENT)
Dept: LAB | Facility: HOSPITAL | Age: 38
End: 2023-08-14
Attending: STUDENT IN AN ORGANIZED HEALTH CARE EDUCATION/TRAINING PROGRAM
Payer: COMMERCIAL

## 2023-08-14 DIAGNOSIS — N91.2 ABSENCE OF MENSTRUATION: Primary | ICD-10-CM

## 2023-08-14 LAB — B-HCG FREE SERPL-ACNC: ABNORMAL MIU/ML

## 2023-08-14 PROCEDURE — 84702 CHORIONIC GONADOTROPIN TEST: CPT

## 2023-08-14 PROCEDURE — 36415 COLL VENOUS BLD VENIPUNCTURE: CPT

## 2023-08-24 ENCOUNTER — LAB VISIT (OUTPATIENT)
Dept: LAB | Facility: HOSPITAL | Age: 38
End: 2023-08-24
Attending: STUDENT IN AN ORGANIZED HEALTH CARE EDUCATION/TRAINING PROGRAM
Payer: COMMERCIAL

## 2023-08-24 DIAGNOSIS — Z34.91 FIRST TRIMESTER PREGNANCY: ICD-10-CM

## 2023-08-24 DIAGNOSIS — Z00.00 ROUTINE GENERAL MEDICAL EXAMINATION AT A HEALTH CARE FACILITY: Primary | ICD-10-CM

## 2023-08-24 LAB
BASOPHILS # BLD AUTO: 0.02 X10(3)/MCL
BASOPHILS NFR BLD AUTO: 0.3 %
EOSINOPHIL # BLD AUTO: 0.04 X10(3)/MCL (ref 0–0.9)
EOSINOPHIL NFR BLD AUTO: 0.7 %
ERYTHROCYTE [DISTWIDTH] IN BLOOD BY AUTOMATED COUNT: 14.2 % (ref 11.5–17)
GLUCOSE 1H P 100 G GLC PO SERPL-MCNC: 134 MG/DL (ref 100–180)
GROUP & RH: NORMAL
HBV SURFACE AG SERPL QL IA: NONREACTIVE
HCT VFR BLD AUTO: 39.2 % (ref 37–47)
HGB BLD-MCNC: 13.1 G/DL (ref 12–16)
HIV 1+2 AB+HIV1 P24 AG SERPL QL IA: NONREACTIVE
IMM GRANULOCYTES # BLD AUTO: 0.04 X10(3)/MCL (ref 0–0.04)
IMM GRANULOCYTES NFR BLD AUTO: 0.7 %
INDIRECT COOMBS GEL: NORMAL
LYMPHOCYTES # BLD AUTO: 1.75 X10(3)/MCL (ref 0.6–4.6)
LYMPHOCYTES NFR BLD AUTO: 30.1 %
MCH RBC QN AUTO: 28.9 PG (ref 27–31)
MCHC RBC AUTO-ENTMCNC: 33.4 G/DL (ref 33–36)
MCV RBC AUTO: 86.3 FL (ref 80–94)
MONOCYTES # BLD AUTO: 0.34 X10(3)/MCL (ref 0.1–1.3)
MONOCYTES NFR BLD AUTO: 5.8 %
NEUTROPHILS # BLD AUTO: 3.63 X10(3)/MCL (ref 2.1–9.2)
NEUTROPHILS NFR BLD AUTO: 62.4 %
NRBC BLD AUTO-RTO: 0 %
PLATELET # BLD AUTO: 266 X10(3)/MCL (ref 130–400)
PMV BLD AUTO: 10 FL (ref 7.4–10.4)
RBC # BLD AUTO: 4.54 X10(6)/MCL (ref 4.2–5.4)
SPECIMEN OUTDATE: NORMAL
T PALLIDUM AB SER QL: NONREACTIVE
TSH SERPL-ACNC: 0.54 UIU/ML (ref 0.35–4.94)
WBC # SPEC AUTO: 5.82 X10(3)/MCL (ref 4.5–11.5)

## 2023-08-24 PROCEDURE — 86900 BLOOD TYPING SEROLOGIC ABO: CPT | Performed by: STUDENT IN AN ORGANIZED HEALTH CARE EDUCATION/TRAINING PROGRAM

## 2023-08-24 PROCEDURE — 86762 RUBELLA ANTIBODY: CPT

## 2023-08-24 PROCEDURE — 36415 COLL VENOUS BLD VENIPUNCTURE: CPT

## 2023-08-24 PROCEDURE — 85660 RBC SICKLE CELL TEST: CPT

## 2023-08-24 PROCEDURE — 87340 HEPATITIS B SURFACE AG IA: CPT

## 2023-08-24 PROCEDURE — 84443 ASSAY THYROID STIM HORMONE: CPT

## 2023-08-24 PROCEDURE — 87389 HIV-1 AG W/HIV-1&-2 AB AG IA: CPT

## 2023-08-24 PROCEDURE — 87088 URINE BACTERIA CULTURE: CPT

## 2023-08-24 PROCEDURE — 85025 COMPLETE CBC W/AUTO DIFF WBC: CPT

## 2023-08-24 PROCEDURE — 82950 GLUCOSE TEST: CPT

## 2023-08-24 PROCEDURE — 86780 TREPONEMA PALLIDUM: CPT

## 2023-08-25 LAB
HGB S BLD QL SOLY: NEGATIVE
RUBV IGG SERPL IA-ACNC: 2.9
RUBV IGG SERPL QL IA: POSITIVE

## 2023-08-26 LAB — BACTERIA UR CULT: NORMAL

## 2023-08-29 ENCOUNTER — LAB VISIT (OUTPATIENT)
Dept: LAB | Facility: HOSPITAL | Age: 38
End: 2023-08-29
Attending: STUDENT IN AN ORGANIZED HEALTH CARE EDUCATION/TRAINING PROGRAM
Payer: COMMERCIAL

## 2023-08-29 DIAGNOSIS — Z34.91 FIRST TRIMESTER PREGNANCY: Primary | ICD-10-CM

## 2023-08-29 LAB
PROT 24H UR-MCNC: NORMAL G/DL
PROT UR STRIP-MCNC: <6.8 MG/DL
TOTAL VOLUME  (OHS): 2100 ML

## 2023-08-29 PROCEDURE — 84156 ASSAY OF PROTEIN URINE: CPT

## 2023-08-30 DIAGNOSIS — Z98.890 HISTORY OF CERCLAGE, CURRENTLY PREGNANT: ICD-10-CM

## 2023-08-30 DIAGNOSIS — O09.299 HISTORY OF CERCLAGE, CURRENTLY PREGNANT: ICD-10-CM

## 2023-08-30 DIAGNOSIS — O10.919 CHRONIC HYPERTENSION AFFECTING PREGNANCY: Primary | ICD-10-CM

## 2023-09-18 ENCOUNTER — PROCEDURE VISIT (OUTPATIENT)
Dept: MATERNAL FETAL MEDICINE | Facility: CLINIC | Age: 38
End: 2023-09-18
Payer: COMMERCIAL

## 2023-09-18 ENCOUNTER — OFFICE VISIT (OUTPATIENT)
Dept: MATERNAL FETAL MEDICINE | Facility: CLINIC | Age: 38
End: 2023-09-18
Payer: COMMERCIAL

## 2023-09-18 VITALS
HEIGHT: 63 IN | DIASTOLIC BLOOD PRESSURE: 97 MMHG | BODY MASS INDEX: 34.02 KG/M2 | HEART RATE: 91 BPM | WEIGHT: 192 LBS | SYSTOLIC BLOOD PRESSURE: 138 MMHG

## 2023-09-18 DIAGNOSIS — O09.521 MULTIGRAVIDA OF ADVANCED MATERNAL AGE IN FIRST TRIMESTER: ICD-10-CM

## 2023-09-18 DIAGNOSIS — Z98.890 HISTORY OF CERVICAL CERCLAGE, CURRENTLY PREGNANT IN FIRST TRIMESTER: ICD-10-CM

## 2023-09-18 DIAGNOSIS — O09.299 HISTORY OF CERCLAGE, CURRENTLY PREGNANT: ICD-10-CM

## 2023-09-18 DIAGNOSIS — Z86.32 HISTORY OF INSULIN CONTROLLED GESTATIONAL DIABETES MELLITUS (GDM): ICD-10-CM

## 2023-09-18 DIAGNOSIS — O10.011 PRE-EXISTING ESSENTIAL HYPERTENSION AFFECTING PREGNANCY IN FIRST TRIMESTER: ICD-10-CM

## 2023-09-18 DIAGNOSIS — O46.8X1 SUBCHORIONIC HEMORRHAGE OF PLACENTA IN FIRST TRIMESTER, SINGLE OR UNSPECIFIED FETUS: ICD-10-CM

## 2023-09-18 DIAGNOSIS — O99.211 OBESITY AFFECTING PREGNANCY IN FIRST TRIMESTER: ICD-10-CM

## 2023-09-18 DIAGNOSIS — O09.90 AT HIGH RISK FOR COMPLICATIONS OF INTRAUTERINE PREGNANCY (IUP): ICD-10-CM

## 2023-09-18 DIAGNOSIS — O99.281 HYPOTHYROID IN PREGNANCY, ANTEPARTUM, FIRST TRIMESTER: ICD-10-CM

## 2023-09-18 DIAGNOSIS — O41.8X10 SUBCHORIONIC HEMORRHAGE OF PLACENTA IN FIRST TRIMESTER, SINGLE OR UNSPECIFIED FETUS: ICD-10-CM

## 2023-09-18 DIAGNOSIS — E03.9 HYPOTHYROID IN PREGNANCY, ANTEPARTUM, FIRST TRIMESTER: ICD-10-CM

## 2023-09-18 DIAGNOSIS — O10.919 CHRONIC HYPERTENSION AFFECTING PREGNANCY: ICD-10-CM

## 2023-09-18 DIAGNOSIS — O09.291 HX OF PREECLAMPSIA, PRIOR PREGNANCY, CURRENTLY PREGNANT, FIRST TRIMESTER: ICD-10-CM

## 2023-09-18 DIAGNOSIS — O09.291 HISTORY OF CERVICAL CERCLAGE, CURRENTLY PREGNANT IN FIRST TRIMESTER: ICD-10-CM

## 2023-09-18 DIAGNOSIS — Z98.890 HISTORY OF CERCLAGE, CURRENTLY PREGNANT: ICD-10-CM

## 2023-09-18 DIAGNOSIS — O26.21 HABITUAL ABORTION HISTORY, ANTEPARTUM, FIRST TRIMESTER: ICD-10-CM

## 2023-09-18 PROBLEM — O20.8 SUBCHORIONIC HEMORRHAGE OF PLACENTA IN FIRST TRIMESTER: Status: ACTIVE | Noted: 2023-09-18

## 2023-09-18 PROBLEM — J06.9 BACTERIAL URI: Status: RESOLVED | Noted: 2022-12-07 | Resolved: 2023-09-18

## 2023-09-18 PROBLEM — B96.89 BACTERIAL URI: Status: RESOLVED | Noted: 2022-12-07 | Resolved: 2023-09-18

## 2023-09-18 PROCEDURE — 3008F BODY MASS INDEX DOCD: CPT | Mod: CPTII,S$GLB,, | Performed by: OBSTETRICS & GYNECOLOGY

## 2023-09-18 PROCEDURE — 3080F DIAST BP >= 90 MM HG: CPT | Mod: CPTII,S$GLB,, | Performed by: OBSTETRICS & GYNECOLOGY

## 2023-09-18 PROCEDURE — 3075F SYST BP GE 130 - 139MM HG: CPT | Mod: CPTII,S$GLB,, | Performed by: OBSTETRICS & GYNECOLOGY

## 2023-09-18 PROCEDURE — 99204 OFFICE O/P NEW MOD 45 MIN: CPT | Mod: 25,S$GLB,, | Performed by: OBSTETRICS & GYNECOLOGY

## 2023-09-18 PROCEDURE — 76801 OB US < 14 WKS SINGLE FETUS: CPT | Mod: S$GLB,,, | Performed by: OBSTETRICS & GYNECOLOGY

## 2023-09-18 PROCEDURE — 3008F PR BODY MASS INDEX (BMI) DOCUMENTED: ICD-10-PCS | Mod: CPTII,S$GLB,, | Performed by: OBSTETRICS & GYNECOLOGY

## 2023-09-18 PROCEDURE — 99204 PR OFFICE/OUTPT VISIT, NEW, LEVL IV, 45-59 MIN: ICD-10-PCS | Mod: 25,S$GLB,, | Performed by: OBSTETRICS & GYNECOLOGY

## 2023-09-18 PROCEDURE — 3075F PR MOST RECENT SYSTOLIC BLOOD PRESS GE 130-139MM HG: ICD-10-PCS | Mod: CPTII,S$GLB,, | Performed by: OBSTETRICS & GYNECOLOGY

## 2023-09-18 PROCEDURE — 1159F PR MEDICATION LIST DOCUMENTED IN MEDICAL RECORD: ICD-10-PCS | Mod: CPTII,S$GLB,, | Performed by: OBSTETRICS & GYNECOLOGY

## 2023-09-18 PROCEDURE — 76801 PR US, OB <14WKS, TRANSABD, SINGLE GESTATION: ICD-10-PCS | Mod: S$GLB,,, | Performed by: OBSTETRICS & GYNECOLOGY

## 2023-09-18 PROCEDURE — 1159F MED LIST DOCD IN RCRD: CPT | Mod: CPTII,S$GLB,, | Performed by: OBSTETRICS & GYNECOLOGY

## 2023-09-18 PROCEDURE — 3080F PR MOST RECENT DIASTOLIC BLOOD PRESSURE >= 90 MM HG: ICD-10-PCS | Mod: CPTII,S$GLB,, | Performed by: OBSTETRICS & GYNECOLOGY

## 2023-09-18 RX ORDER — PROGESTERONE 100 MG/1
100 CAPSULE ORAL
COMMUNITY
Start: 2023-08-17 | End: 2023-11-29

## 2023-09-18 NOTE — PROGRESS NOTES
Maternal Fetal Medicine New Consult    Subjective     Patient ID: [id number]    Chief Complaint: MFM consult with US (AMA, CHTN and h/o cerclage.)      HPI 38 y.o.  female  at 12w2d gestation with Estimated Date of Delivery: 3/30/24. by  7w5d US and unknown LMP. She is sent for MFM consultation for CHTN, history of cerclage.  She has history of chronic hypertension.  She was prescribed Procardia 30 b.i.d., but reports is not taking it at this time as she was waiting for MFM visit.  She has history of 3 pregnancy losses. She had negative APS testing.   In 2014 pregnancy, she experienced cramping for a few hours and then delivered at 14 weeks gestation.  She is currently on progesterone 100 mg p.o. daily.  She had ultrasound indicated cerclage in her last pregnancy and was on nightly vaginal progesterone.   She delivered at 36 weeks in her last pregnancy due to preeclampsia.  She has increased BMI of 34 at consult visit.  On 2023, 24 hour urine with <6.8 mg protein.  On 2023 TSH 0.541 and normal early 1 hour GCT. She has history of gestational diabetes treated with metformin and insulin and also has history of PCOS.  She is history of hypothyroidism and was previously on Synthroid in her last pregnancy.  She is not currently medication.  She had normal TSH as above.  She is of advanced maternal age and will be 38 by the EDC.  She already did cell free DNA that is pending.  She denies any personal or family history of aneuploidy or anomalies. She denies any exposure to high fevers, viral rashes, illicit drugs or alcohol in this pregnancy.  She denies any leaking fluid, vaginal bleeding, contractions, decreased fetal movement. Denies headaches, visual disturbances, or epigastric pain    Pregnancy complications include:   Patient Active Problem List   Diagnosis    Essential (primary) hypertension    Mixed hyperlipidemia    Pre-existing essential hypertension affecting pregnancy in first trimester     Multigravida of advanced maternal age in first trimester    History of cervical cerclage, currently pregnant in first trimester    Habitual  history, antepartum, first trimester    Increased BMI affecting pregnancy in first trimester    History of insulin controlled gestational diabetes mellitus (GDM)    Hypothyroid in pregnancy, antepartum, first trimester    At high risk for complications of intrauterine pregnancy (IUP)    Hx of preeclampsia, prior pregnancy, currently pregnant, first trimester    Subchorionic hemorrhage of placenta in first trimester        Past Medical History:   Diagnosis Date    Essential (primary) hypertension     Mixed hyperlipidemia     Personal history of gestational diabetes      labor in second trimester with  delivery in second trimester     Thyroid disease     Well adult exam        Past Surgical History:   Procedure Laterality Date    CERVICAL CERCLAGE N/A 2022    DILATION AND CURETTAGE OF UTERUS         Family History   Problem Relation Age of Onset    No Known Problems Mother     Hypertension Father     Hyperlipidemia Father        Social History     Socioeconomic History    Marital status:    Tobacco Use    Smoking status: Never    Smokeless tobacco: Never   Substance and Sexual Activity    Alcohol use: Not Currently    Drug use: Never    Sexual activity: Yes     Partners: Male     Birth control/protection: None       Current Outpatient Medications   Medication Sig Dispense Refill    prenatal vit/iron fum/folic ac (PRENATAL 1+1 ORAL) Take by mouth.      progesterone (PROMETRIUM) 100 MG capsule Take 100 mg by mouth.      NIFEdipine (PROCARDIA-XL) 30 MG (OSM) 24 hr tablet Take 1 tablet (30 mg total) by mouth 2 (two) times daily. 60 tablet 0     No current facility-administered medications for this visit.       Review of patient's allergies indicates:  No Known Allergies    Medications:  Current Outpatient Medications   Medication Instructions     "NIFEdipine (PROCARDIA-XL) 30 mg, Oral, 2 times daily    prenatal vit/iron fum/folic ac (PRENATAL 1+1 ORAL) Oral    progesterone (PROMETRIUM) 100 mg, Oral       Review of Systems   12 point review of systems conducted, negative except as stated in the history of present illness. See HPI for details.      Objective     Visit Vitals  BP (!) 138/97 (BP Location: Right arm, Patient Position: Sitting, BP Method: Large (Automatic))   Pulse 91   Ht 5' 3" (1.6 m)   Wt 87.1 kg (192 lb)   BMI 34.01 kg/m²        Physical Exam  Vitals and nursing note reviewed. Exam conducted with a chaperone present.   Constitutional:       General: She is not in acute distress.     Appearance: Normal appearance.      Comments: Increased BMI   HENT:      Head: Normocephalic and atraumatic.      Nose: Nose normal. No congestion.      Mouth/Throat:      Pharynx: Oropharynx is clear.   Eyes:      General: No scleral icterus.     Pupils: Pupils are equal, round, and reactive to light.   Cardiovascular:      Rate and Rhythm: Normal rate and regular rhythm.   Pulmonary:      Effort: Pulmonary effort is normal.   Abdominal:      Palpations: Abdomen is soft.      Tenderness: There is no abdominal tenderness. There is no right CVA tenderness, left CVA tenderness or guarding.      Comments: No CVA tenderness gravid uterus.    Musculoskeletal:         General: Normal range of motion.      Cervical back: Neck supple.      Right lower leg: No edema.      Left lower leg: No edema.   Skin:     General: Skin is warm.      Findings: No bruising or rash.   Neurological:      General: No focal deficit present.      Mental Status: She is oriented to person, place, and time.      Deep Tendon Reflexes: Reflexes normal.      Comments: Normal reflexes   Psychiatric:         Mood and Affect: Mood normal.         Behavior: Behavior normal.         Thought Content: Thought content normal.         Judgment: Judgment normal.         ASSESSMENT/PLAN:     38 y.o.  " female with IUP at 12w2d    Chronic hypertension with history of preeclampsia in pregnancy  Chronic hypertension complicates up to 2-5% of pregnancies and is associated with significant adverse pregnancy outcomes including  birth, fetal growth restriction, fetal demise, placental abruption, and  delivery. Recent data suggest higher risk of certain congenital anomalies, including, heart defects, hypospadias and esophageal atresia. The incidence of adverse outcomes seen in pregnancies complicated by chronic hypertension is related to the degree and duration of hypertension and to the association of other organ system involvement or damage. Most pregnant women with mild chronic hypertension have uneventful pregnancies with no end-organ involvement. Comparing women who developed superimposed preeclampsia with women who did not, the incidence of  birth was 100% versus 38%, the incidence of small-for-gestational-age (SGA) infants was 78% versus 15%, and the  mortality rate was 48% versus 0%. Besides superimposed preeclampsia or eclampsia, pregnancy complicated by chronic hypertension (especially if severe) may be associated with worsening or malignant hypertension, central nervous system hemorrhage, cardiac decompensation, and renal deterioration or failure.    The age of onset, results of previous evaluation, severity and duration of hypertension, and physical examination are important determinants of which clinical tests may be useful. Ideally, a woman with chronic hypertension should be evaluated before pregnancy to ascertain potentially reversible causes and end-organ involvement (eg, heart or kidney). Baseline laboratory tests may include serum creatinine, blood urea nitrogen, electrolytes, CBC and 24-hour urine evaluation for total protein and creatinine clearance. Women who have had hypertension for several years are more likely to have cardiomegaly, ischemic heart disease, renal  "involvement, and retinopathy. In patients with severe disease over 4 years, or long standing hypertension (typically if over 30 years old), tests which may prove useful in the evaluation of these women include electrocardiography, echocardiography, ophthalmologic examination, and renal ultrasonography.     Women with paroxysmal hypertension, frequent "hypertensive crisis," seizure disorders, or anxiety attacks should be evaluated for pheochromocytoma with measurements of 24-hour urine vanillylmandelic acid, metanephrines, or unconjugated catecholamines. Magnetic resonance imaging after the first trimester or computed tomography may be useful for adrenal tumor localization. Renal artery stenosis appears to be more prevalent in patients with type-2 diabetes and coexistent hypertension. Doppler flow studies or magnetic resonance angiography are helpful to detect renal artery stenosis. Negative results from renal ultrasonography do not exclude renal artery stenosis.     In women with chronic hypertension, it can be difficult to discern worsening hypertension that might occur as a result of physiological changes of pregnancy in the third trimester from the development of preeclampsia. The use of certain laboratory tests such as serum creatinine, liver functions tests, hematocrit and platelets to compare to baseline values from early in pregnancy might serve to delineate these conditions. Routine screening of women with chronic hypertension with these laboratory tests is not routinely indicated.    I have shared with her the normal natural course of chronic hypertension in pregnancy with improvement early on and likely increasing blood pressure as the pregnancy advances. Based on findings of recent CHAP Study, it is recommended to utilize 140/90 as the threshold for initiation or titration of medical therapy for chronic hypertension in pregnancy, rather than the previously recommended threshold of 160/110. For patients on " blood pressure medications at the start of pregnancy, in the absence of mitigating factors or side effects, they can be maintained on their medications, rather than discontinuing them and waiting to initiate treatment for blood pressures in the severe range. Commonly used medications include nifedipine and labetalol.    BP Readings from Last 1 Encounters:   23 (!) 138/97     With this blood pressure, she was advised to continue low salt diet.  She is also to follow a low sodium diet avoiding any excessive weight gain.     Use aspirin as discussed.    She was advised of the higher risk of fetal growth restriction and superimposed preeclampsia with her underlying chronic hypertension. The risk of placental abruption was also discussed. No prevention is available with her reporting any bleeding or cramping. She was also advised to report any headaches, visual problems, epigastric pain.    There is no consensus as to the most appropriate fetal surveillance test(s) or the interval and timing of testing in  with chronic hypertension. Thus, such testing should be individualized and based on clinical judgment and on severity of disease.    We will plan to do a level 2 scan around 20 weeks understanding the limitations of ultrasound in checking anomalies and doing follow-up ultrasounds to monitor growth around 24-26 weeks and then every  4 weeks with fetal testing starting around 32 weeks gestation till the end of pregnancy.     Among patients with uncomplicated chronic hypertension with no additional risk factors, delivery at 38-39 weeks appears to provide optimal balance between the risk of adverse fetal and  outcomes. If no medication and normal fetal assessment, recommend delivery at 39 weeks. However, will reassess closer to EDC to determine optimal timeframe or GA for delivery, based on current evaluation at that time.       History of cervical insufficiency and cerclage  Discussed with her the  association of short cervix with increased risk of  delivery. I discussed the benefits (prolonging pregnancy and improving outcomes with cervix under 2.5 cm, in someone with previous  delivery) and risks options including cervical cerclage and alternatives of cervical cerclage including expectant management, limited activity and pelvic rest. Progesterone supplementation may be considered if short cervix, especially if cervix is less than 2.5 cm.  We will plan to do a vaginal ultrasound at 16 weeks and if cervix is length is short we will give vaginal progesterone..      She was advised that risks of the cerclage including the risk of ruptured membrane at the time of surgery that could lead to pregnancy loss, risk of anesthesia, pain, infection, hemorrhage, risk of significant bleeding that could lead to pregnancy loss, hysterectomy in emergency, risk of injury to adjacent structures including risk of bladder, bowel, or ureter injury, risk of fistula formation, as well as the risk of prolonging the pregnancy to periviability with delivery of very premature fetus with all the complications of prematurity. The potential inability to place cerclage and potential of failure to prevent  delivery were discussed.  Her questions were answered, and she would like to have the cerclage, which will be scheduled next week. Will also plan to give course of Indocin.    Recommendations:  Patient scheduled for cerclage placement next week   Progesterone supplementation as discussed above  Follow-up with MFM 1-2 weeks after cerclage for post-operative exam  Routine cervical length monitoring is not recommended following cerclage placement  Monitor for signs of symptoms of  labor (LOF, vaginal bleeding, regular contractions) and signs of infection  To OB ED with concern for  labor or infection following cerclage placement  Remove cerclage at 36-37 weeks, unless indicated earlier       Advanced  maternal age  I discussed with her that the higher risk of aneuploidy related to advanced maternal age is caused by meiotic nondysjunction.  At this maternal  age, the risk of Down syndrome quoted at 1:267 and the risk of any chromosomal problems quoted at 1:148 .  she would not consider termination of pregnancy even if anomalies or aneuploidy is detected .. She was advised of the screening nature of the Level 2 ultrasound as well as the screening nature of a quad screen to check for the risk of aneuploidy with normal ultrasound and or quad screen testing that would lower the background risk of aneuploidy.        She was advised that the only diagnostic test at this time is genetic amniocentesis.  Benefits and risks of a genetic amniocentesis were discussed. Patient was offered genetic amniocentesis, after thorough counseling on benefits and risks of procedure, with very remote risk of complications and in some studies no identifiable increased risk, above background risk of spontaneous miscarriage, while some current data suggests risk up to 1 in 800 with early amniocentesis prior to 24 weeks, and remote risk of need for emergency delivery with all the complications of prematurity with amniocentesis after 24 weeks. She declined amniocentesis . She is aware of the need for  evaluation.    She was counseled on Cell free DNA understanding that it is an enhanced screening test but not a diagnostic test. It assesses risk for common aneuploidies such as Trisomy 13, 18, and 21 by evaluating cell-free fetal DNA in maternal circulation with a false positive rate less than 0.5% for Trisomy 21 and less reliable for Trisomy 13 and Trisomy 18. She already did cell free DNA that is pending.    The higher risk of anomalies associated with advanced maternal age was also addressed. The reassurance obtained by the normal ultrasound and the limitations of ultrasound in checking for anomalies was explained with the need for  regular  evaluations.       History of recurrent miscarriages  Recurrent miscarriages as defined by the loss of three or more consecutive pregnancies that occur in about 1% of couples attempting pregnancy and with 2 recurrent losses occurring in about 5% of such couples, with a higher rate in women over 35 years. I discussed with her the causes of repetitive first trimester miscarriages.     I discussed with her that any chronic medical illnesses, like uncontrolled diabetes or thyroid disease could potentially cause that.     Antiphospholipid syndrome (APS) is an autoimmune disorder characterized by the presence of significant levels of antiphospholipid antibodies and one or more clinical features, among which are recurrent pregnancy loss, fetal death >10 weeks, and thrombosis. APS may occur as a primary condition in women with no other recognizable autoimmune disease, or as a secondary condition in patients with underlying autoimmune disease (eg, systemic lupus erythematosus). Antiphospholipid antibody syndrome may be cause of repetitive first trimester miscarriages. 5-15% of women with recurrent miscarriages have significant antiphospholipid antibody titers compared with 2-5% of unselected obstetric patients.  She previously had negative APS testing.    She was advised that recurrence of pregnancy loss is likely, even in the absence of any findings on evaluation. Consequently, she should make sure she is ready emotionally, and with good support system, before she tries to get pregnant again, in view of the emotional stress involved with any pregnancy loss, especially if recurrent.       Increased BMI with history of gestational diabetes  I discussed the risk of miscarriage in first trimester, recurrent miscarriages, congenital anomalies, hypertension, diabetes,  labor and the higher risk of  section and the higher risk of fetal demise in-utero. There is also higher risk of for excessive  fetal weight and large for gestational age (LGA) fetuses. Mothers with LGA fetuses are at higher risk of prolonged labor,  delivery, shoulder dystocia and birth trauma. LGA neonates are increased risk of fetal hypoxia and intrauterine death, and are at risk to develop diabetes, obesity, metabolic syndrome, asthma and cancer later in life. She was advised of the importance of eating healthy and limiting weight gain to 11-20 lbs during the pregnancy, as optimal in this situation. I recommended low calorie, low fat diet avoiding any additional excessive weight gain. Excess weight gain would be associated with gestational hypertension, gestational diabetes and adverse  outcomes, including fetal demise in utero.    It is important to do FKC from 24 weeks till delivery.       Importance of working on losing weight after the pregnancy is over, especially before a future pregnancy was discussed. Breastfeeding may be an important tool in reducing the postpartum weight retention. Fetal risks were discussed with short term risk of fetal/ obesity and long term risk of adolescent component of metabolic syndrome.    With higher risks for gestational diabetes with BMI greater than 30, she had normal early 1hr GCT. Recommend repeat 1hr GCT around 26-28 weeks gestation.       Hypothyroidism in pregnancy  I have discussed with her the likelihood of higher dose of Synthroid to be needed during the pregnancy with maternal hypothyroidism.  The importance of maintaining a euthyroid status for optimal pregnancy outcome was discussed to decrease the risk of adverse pregnancy outcome with uncontrolled hypothyroidism. Discussed that thyroxine (T4) is needed for fetal brain development. Also, high hcg levels in first trimester, can cause decreased TSH and need to adjust accordingly. Risks associated with uncontrolled hypothyroidism, include potential for pregnancy loss, neurologic/cognitive deficit in the baby,   delivery, LBW,  delivery, and gestational hypertension/preeclampsia was discussed.    Currently, it is recommended to do trimester specific thyroid testing monitoring in pregnancy with TSH for adequate management and dosing of medication. Thyroid levels for   1st trimester: TSH - 0.1- 2.5   2nd trimester: TSH 0.2- 3.0   3rd trimester: TSH 0.3- 3.0  Free T4 levels may be unreliable in pregnancy, in addition to varying lab reference ranges. Although, abnormality 1.5 fold outside of reference range is suggestive of abnormality.  The prognosis should be quite good with adequately controlled hypothyroidism on medicine. I have suggested checking TSH results that were obtained initially. If it is normal, then I suggest rechecking the TSH every two months in the pregnancy and if restarting Synthroid is needed, then a repeat TSH could be done in 3-4 weeks after that. She was instructed to report any symptoms of cold/heat intolerance, heart palpitations, fatigue or constipation.  With normal TSH, advised patient to stay off of Synthroid at this time.        At high risk for preeclampsia  With her risk factors for preeclampsia including preeclampsia/GHTN in a previous pregnancy and chronic hypertension , BMI over 30,  ancestry, maternal age 35 years or older, and patient born with low birthweight or SGA, discussed recommendations for low dose aspirin use to decrease risks for adverse outcomes, including preeclampsia, low birth rate and  delivery. Low-dose aspirin reduced the risk for preeclampsia by 24% in clinical trials and reduced the risk for  birth by 14% and FGR by 20%.  After discussing risks/benefits of its use, it was agreed to start asa 81 mg BID, due to multiple risk factors for preeclampsia. After discussing benefits (more effective than daily) vs potential risks (less data on safety with use at delivery), she agreed to start low dose aspirin twice daily and continue until 34  6/7weeks, then decrease to once daily until delivery.. Also, recommend using in all future pregnancies.       Subchorionic hemorrhage  She was advised that most patients with this do well in pregnancy. However, there is higher association of bleeding in later pregnancy as well as pregnancy loss (if less than 24 weeks) PPROM,  labor and delivery, especially in patient with bleeding episodes rather than asymptomatic subchorionic hemorrhage.  There is no prevention available and advised of need for pelvic rest x 2 weeks from last bleeding episode with expectant management will be done and advised to report any bleeding or increased cramps mainly in the second half of the pregnancy.          with short cervix at 2.4 cm at 16 weeks in a previous pregnancy and patient having a cerclage, reviewed with her the options of scheduling a cerclage versus doing cervical surveillance and patient wanted to do a cervical cerclage.  Benefits and risks of the alternatives explained.  Informed consent was obtained.  Blood pressure is mildly elevated, with the expected decreased blood pressure over next 2 months, agreed to continue low-salt diet and avoid medication at this time.  Started patient on baby aspirin after cerclage in 2 weeks, twice a day, after counseling on options of twice a day versus daily,    Follow up in about 8 weeks (around 2023) for MFM follow-up, Level 2 scan (4 weeks vaginal ultrasound MFM follow-up).     Future Appointments   Date Time Provider Department Center   10/16/2023  1:30 PM Xiao Cooper PA-C [facility]   10/16/2023  1:30 PM ROOM 1 AND 2, [facility]   2023  1:30 PM Tobias Alfonso MD [facility]   2023  1:30 PM ROOM 1 AND 2, [facility]   2023  3:20 PM Shawna Stewart MD [place] [id number]        Patient was evaluated by MARIBEL Shah and Dr. Alfonso.  Final assessment and recommendations as stated  above were made by Dr. Alfonso.    Components of this note were documented using voice recognition systems; and are subject to errors not corrected at proof reading.  Please contact the author for any clarifications.

## 2023-09-21 ENCOUNTER — TELEPHONE (OUTPATIENT)
Dept: MATERNAL FETAL MEDICINE | Facility: CLINIC | Age: 38
End: 2023-09-21

## 2023-09-28 ENCOUNTER — TELEPHONE (OUTPATIENT)
Dept: MATERNAL FETAL MEDICINE | Facility: CLINIC | Age: 38
End: 2023-09-28

## 2023-09-29 ENCOUNTER — OUTSIDE PLACE OF SERVICE (OUTPATIENT)
Dept: MATERNAL FETAL MEDICINE | Facility: CLINIC | Age: 38
End: 2023-09-29

## 2023-09-29 DIAGNOSIS — O34.31 MATERNAL CARE FOR CERVICAL INCOMPETENCE IN FIRST TRIMESTER: Primary | ICD-10-CM

## 2023-09-29 PROCEDURE — 59320 REVISION OF CERVIX: CPT | Mod: ,,, | Performed by: OBSTETRICS & GYNECOLOGY

## 2023-09-29 PROCEDURE — 59320 PR REVISION CERVIX W PREG,VAG APPRCH: ICD-10-PCS | Mod: ,,, | Performed by: OBSTETRICS & GYNECOLOGY

## 2023-10-13 DIAGNOSIS — O10.011 PRE-EXISTING ESSENTIAL HYPERTENSION AFFECTING PREGNANCY IN FIRST TRIMESTER: Primary | ICD-10-CM

## 2023-10-16 ENCOUNTER — PROCEDURE VISIT (OUTPATIENT)
Dept: MATERNAL FETAL MEDICINE | Facility: CLINIC | Age: 38
End: 2023-10-16
Payer: COMMERCIAL

## 2023-10-16 ENCOUNTER — OFFICE VISIT (OUTPATIENT)
Dept: MATERNAL FETAL MEDICINE | Facility: CLINIC | Age: 38
End: 2023-10-16
Payer: COMMERCIAL

## 2023-10-16 VITALS
BODY MASS INDEX: 34.55 KG/M2 | HEART RATE: 90 BPM | DIASTOLIC BLOOD PRESSURE: 84 MMHG | SYSTOLIC BLOOD PRESSURE: 138 MMHG | HEIGHT: 63 IN | WEIGHT: 195 LBS

## 2023-10-16 DIAGNOSIS — O09.90 AT HIGH RISK FOR COMPLICATIONS OF INTRAUTERINE PREGNANCY (IUP): ICD-10-CM

## 2023-10-16 DIAGNOSIS — O41.8X20 SUBCHORIONIC HEMORRHAGE OF PLACENTA IN SECOND TRIMESTER, SINGLE OR UNSPECIFIED FETUS: ICD-10-CM

## 2023-10-16 DIAGNOSIS — Z98.890 HISTORY OF CERCLAGE, CURRENTLY PREGNANT, SECOND TRIMESTER: ICD-10-CM

## 2023-10-16 DIAGNOSIS — O99.212 OBESITY AFFECTING PREGNANCY IN SECOND TRIMESTER, UNSPECIFIED OBESITY TYPE: ICD-10-CM

## 2023-10-16 DIAGNOSIS — O46.8X2 SUBCHORIONIC HEMORRHAGE OF PLACENTA IN SECOND TRIMESTER, SINGLE OR UNSPECIFIED FETUS: ICD-10-CM

## 2023-10-16 DIAGNOSIS — O26.22 HABITUAL ABORTER, CURRENTLY PREGNANT IN SECOND TRIMESTER: ICD-10-CM

## 2023-10-16 DIAGNOSIS — O09.292 HISTORY OF CERCLAGE, CURRENTLY PREGNANT, SECOND TRIMESTER: ICD-10-CM

## 2023-10-16 DIAGNOSIS — O09.292 HX OF PREECLAMPSIA, PRIOR PREGNANCY, CURRENTLY PREGNANT, SECOND TRIMESTER: ICD-10-CM

## 2023-10-16 DIAGNOSIS — O09.90 AT HIGH RISK FOR COMPLICATIONS OF INTRAUTERINE PREGNANCY (IUP): Primary | ICD-10-CM

## 2023-10-16 DIAGNOSIS — O99.282 HYPOTHYROIDISM AFFECTING PREGNANCY IN SECOND TRIMESTER: ICD-10-CM

## 2023-10-16 DIAGNOSIS — O10.012 PRE-EXISTING ESSENTIAL HYPERTENSION AFFECTING PREGNANCY IN SECOND TRIMESTER: ICD-10-CM

## 2023-10-16 DIAGNOSIS — O34.32 CERVICAL INSUFFICIENCY DURING PREGNANCY IN SECOND TRIMESTER, ANTEPARTUM: ICD-10-CM

## 2023-10-16 DIAGNOSIS — E03.9 HYPOTHYROIDISM AFFECTING PREGNANCY IN SECOND TRIMESTER: ICD-10-CM

## 2023-10-16 DIAGNOSIS — O09.522 MULTIGRAVIDA OF ADVANCED MATERNAL AGE IN SECOND TRIMESTER: Primary | ICD-10-CM

## 2023-10-16 DIAGNOSIS — O09.522 MULTIGRAVIDA OF ADVANCED MATERNAL AGE IN SECOND TRIMESTER: ICD-10-CM

## 2023-10-16 DIAGNOSIS — O10.011 PRE-EXISTING ESSENTIAL HYPERTENSION AFFECTING PREGNANCY IN FIRST TRIMESTER: ICD-10-CM

## 2023-10-16 DIAGNOSIS — Z86.32 HISTORY OF INSULIN CONTROLLED GESTATIONAL DIABETES MELLITUS (GDM): ICD-10-CM

## 2023-10-16 PROBLEM — O09.293 HX OF PREECLAMPSIA, PRIOR PREGNANCY, CURRENTLY PREGNANT, THIRD TRIMESTER: Status: ACTIVE | Noted: 2023-09-18

## 2023-10-16 PROCEDURE — 3075F SYST BP GE 130 - 139MM HG: CPT | Mod: CPTII,S$GLB,,

## 2023-10-16 PROCEDURE — 99213 OFFICE O/P EST LOW 20 MIN: CPT | Mod: S$GLB,,,

## 2023-10-16 PROCEDURE — 99213 PR OFFICE/OUTPT VISIT, EST, LEVL III, 20-29 MIN: ICD-10-PCS | Mod: S$GLB,,,

## 2023-10-16 PROCEDURE — 1160F PR REVIEW ALL MEDS BY PRESCRIBER/CLIN PHARMACIST DOCUMENTED: ICD-10-PCS | Mod: CPTII,S$GLB,,

## 2023-10-16 PROCEDURE — 3075F PR MOST RECENT SYSTOLIC BLOOD PRESS GE 130-139MM HG: ICD-10-PCS | Mod: CPTII,S$GLB,,

## 2023-10-16 PROCEDURE — 3079F DIAST BP 80-89 MM HG: CPT | Mod: CPTII,S$GLB,,

## 2023-10-16 PROCEDURE — 1159F MED LIST DOCD IN RCRD: CPT | Mod: CPTII,S$GLB,,

## 2023-10-16 PROCEDURE — 76817 US MFM PROCEDURE (VIEWPOINT): ICD-10-PCS | Mod: S$GLB,,, | Performed by: OBSTETRICS & GYNECOLOGY

## 2023-10-16 PROCEDURE — 76817 TRANSVAGINAL US OBSTETRIC: CPT | Mod: S$GLB,,, | Performed by: OBSTETRICS & GYNECOLOGY

## 2023-10-16 PROCEDURE — 3079F PR MOST RECENT DIASTOLIC BLOOD PRESSURE 80-89 MM HG: ICD-10-PCS | Mod: CPTII,S$GLB,,

## 2023-10-16 PROCEDURE — 3008F BODY MASS INDEX DOCD: CPT | Mod: CPTII,S$GLB,,

## 2023-10-16 PROCEDURE — 1159F PR MEDICATION LIST DOCUMENTED IN MEDICAL RECORD: ICD-10-PCS | Mod: CPTII,S$GLB,,

## 2023-10-16 PROCEDURE — 1160F RVW MEDS BY RX/DR IN RCRD: CPT | Mod: CPTII,S$GLB,,

## 2023-10-16 PROCEDURE — 3008F PR BODY MASS INDEX (BMI) DOCUMENTED: ICD-10-PCS | Mod: CPTII,S$GLB,,

## 2023-10-16 NOTE — PROGRESS NOTES
Maternal Fetal Medicine Follow Up Consult    Subjective     Patient ID: [id number]    Chief Complaint: M FOLLOWUP W/US      HPI: Tawnya Love is a 38 y.o. female  at 16w2d gestation with Estimated Date of Delivery: 3/30/24  who is here for follow  up consultation by M.  She has history of chronic hypertension.  She was prescribed Procardia 30 b.i.d., but is currently not on antihypertensives due to patient not having started prior to MFM consult visit, and blood pressure was borderline elevated with expected decrease over the next few weeks.  On 2023, 24 hour urine with <6.8 mg protein.  She has history of 3 pregnancy losses and had negative APS testing.   In 2014 pregnancy, she experienced cramping for a few hours and then delivered at 14 weeks gestation.  She was on progesterone earlier in pregnancy, discontinued at consult visit.  She had ultrasound indicated cerclage in her last pregnancy and was on nightly vaginal progesterone.   She delivered at 36 weeks in that pregnancy due to preeclampsia.  She is status post prophylactic cerclage on 2023.  She has increased BMI of 34 at consult visit. . She has history of gestational diabetes treated with metformin and insulin and also has history of PCOS.  She is history of hypothyroidism and was previously on Synthroid in her last pregnancy.  She is not currently medication.   On 2023, TSH 0.541 and normal early 1 hour GCT. She is of advanced maternal age and will be 38 by the EDC.  She already did cell free DNA that was done with primary OB, requested.  She had subchorionic hemorrhage consult ultrasound and denied any vaginal bleeding.  She is not yet on prophylactic low-dose aspirin.         Interval history since last MFM visit: None.. She denies any leaking fluid, vaginal bleeding, contractions, decreased fetal movement. 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    Medications:  Current Outpatient Medications   Medication Instructions    NIFEdipine (PROCARDIA-XL) 30 mg, Oral, 2 times daily    prenatal vit/iron fum/folic ac (PRENATAL 1+1 ORAL) Oral    progesterone (PROMETRIUM) 100 mg, Oral       Review of Systems   12 point review of systems conducted, negative except as stated in the history of present illness. See HPI for details.      Objective     Visit Vitals  /84 (BP Location: Left arm, Patient Position: Sitting, BP Method: Large (Automatic))   Pulse 90   Ht 5' 3" (1.6 m)   Wt 88.5 kg (195 lb)   BMI 34.54 kg/m²        Physical Exam  Vitals and nursing note reviewed.   Constitutional:       Appearance: Normal appearance.      Comments: Increased BMI   HENT:      Head: Normocephalic and atraumatic.      Nose: Nose normal. No congestion.   Cardiovascular:      Rate and Rhythm: Normal rate.   Pulmonary:      Effort: Pulmonary effort is normal.   Skin:     Findings: No rash.   Neurological:      Mental Status: She is alert and oriented to person, place, and time.   Psychiatric:         " Mood and Affect: Mood normal.         Behavior: Behavior normal.         Thought Content: Thought content normal.         Judgment: Judgment normal.           Assessment and Plan     38 y.o.  female with IUP at 16w2d    Chronic hypertension in pregnancy  Chronic hypertension complicates up to 2-5% of pregnancies and is associated with significant adverse pregnancy outcomes including  birth, fetal growth restriction, fetal demise, placental abruption, and  delivery.    BP Readings from Last 1 Encounters:   10/16/23 138/84   With this BP, she was advised to continue low salt diet, and hold off on antihypertensive medication at this time.     Low dose aspirin as discussed.    We will plan to do a level 2 scan around 20 weeks understanding the limitations of ultrasound in checking anomalies and follow-up ultrasounds to monitor growth around 24-26 weeks and then every 4 weeks.  Recommend fetal testing starting around 32 weeks gestation until the end of pregnancy.     Among patients with uncomplicated chronic hypertension with no additional risk factors, delivery at 38-39 weeks appears to provide optimal balance between the risk of adverse fetal and  outcomes. If no medication and normal fetal assessment, recommend delivery at 39 weeks. However, will reassess closer to EDC to determine optimal timeframe or GA for delivery, based on current evaluation at that time.       History of cervical insufficiency and cerclage, s/p prophylactic cerclage  TVUS today 10/16/2023  with normal closed cervix 3.4 cm without funneling at the IO. There is no need for further vaginal ultrasounds at this time. PTL precautions given.    She is aware of risks associated with cerclage placement and that goal is to decrease risk of  labor and delivery. She is to continue limited activity/home rest with ambulation/exercising BLE for DVT prophylaxis, no tub baths, and pelvic rest. She was instructed to report  immediately any abdominal cramping, vaginal bleeding or change in discharge.     Will plan for cerclage removal at 36-37 weeks, unless indicated earlier.  PTL precautions reviewed.        Advanced maternal age  Reviewed risks with AMA, including risks for PTL, FGR, anomalies not seen, aneuploidy and stillbirth at term. She previously declined amniocentesis . She is aware of need for  evaluation. She previously had cell free DNA done with primary OB, requested.    She was advised to do fetal kick counts after 24 weeks and continue till delivery in view of the higher risk of fetal demise in utero closer to term with advanced maternal age.     Fetal surveillance as above.      History of recurrent miscarriages  She previously had negative APS testing.  Expectant management.  PTL precautions given.  Fetal kick count instructions reviewed.        Increased BMI with history of gestational diabetes  Body mass index is 34.54 kg/m². With reasonable weight gain since last visit, she was advised to continue healthy and low caloric diet.  Excess weight gain would be associated with gestational hypertension, gestational diabetes and adverse  outcomes, including fetal demise in utero.    With risk factors associated with increased BMI,  will start fetal kick counts at 24 weeks and continue till delivery.    It is important to lose weight after the pregnancy is over, especially before a future pregnancy was discussed. Breastfeeding may be an important tool in reducing the postpartum weight retention. Fetal risks were discussed with short term risk of fetal/ obesity and long term risk of adolescent component of metabolic syndrome.    With higher risks for gestational diabetes with BMI greater than 30, she had normal early 1hr GCT. Recommend repeat 1hr GCT around 26-28 weeks gestation.       Hypothyroidism in pregnancy  Risks associated with uncontrolled hypothyroidism, including potential for pregnancy  loss, neurologic/cognitive deficit in the baby,  delivery, LBW,  delivery, and gestational hypertension/preeclampsia were previously discussed.    Currently, it is recommended to do trimester specific thyroid testing monitoring in pregnancy with TSH for adequate management and dosing of medication. Thyroid levels for each trimester are as follows:   1st trimester: TSH - 0.1- 2.5   2nd trimester: TSH 0.2- 3.0   3rd trimester: TSH 0.3- 3.0  Free T4 levels may be unreliable in pregnancy, in addition to varying lab reference ranges. Although, abnormality 1.5 fold outside of reference range is suggestive of abnormality.    The prognosis should be quite good with adequately controlled hypothyroidism on medicine. In hypothyroidism, TSH should be monitored, and levothyroxine dose adjusted every 4 weeks until the TSH levels have reached the trimester-specific target. Once adequate dose is established, TSH should be checked every trimester during pregnancy. She was instructed to report any symptoms of cold/heat intolerance, heart palpitations, fatigue or constipation.  With normal TSH, advised patient to  stay off of Synthroid  at this time.        At high risk for preeclampsia  With her increased risk for preeclampsia, she agreed to start aspirin today and continue asa 81 mg BID until 34 6/7 weeks, then decrease to once daily until delivery.. Preeclampsia precautions reviewed.      Subchorionic hemorrhage  There is no prevention available and advised of need for pelvic rest x 2 weeks from last bleeding episode with expectant management will be done and advised to report any bleeding or increased cramps mainly in the second half of the pregnancy.      Follow up for Keep return appointment.     Future Appointments   Date Time Provider Department Center   2023  9:00 AM Tobias Alfonso MD [facility] NABILA Perez [facility]   2023  9:00 AM [facility] AND 2[facility]   2023  3:20 PM Pat  Shawna BURTON MD [place] [id number]        Xiao Cooper PA-C

## 2023-11-06 DIAGNOSIS — O09.522 MULTIGRAVIDA OF ADVANCED MATERNAL AGE IN SECOND TRIMESTER: Primary | ICD-10-CM

## 2023-11-06 DIAGNOSIS — O09.90 AT HIGH RISK FOR COMPLICATIONS OF INTRAUTERINE PREGNANCY (IUP): ICD-10-CM

## 2023-11-06 DIAGNOSIS — O34.32 CERVICAL INSUFFICIENCY DURING PREGNANCY IN SECOND TRIMESTER, ANTEPARTUM: ICD-10-CM

## 2023-11-06 DIAGNOSIS — O99.282 HYPOTHYROIDISM AFFECTING PREGNANCY IN SECOND TRIMESTER: ICD-10-CM

## 2023-11-06 DIAGNOSIS — E03.9 HYPOTHYROIDISM AFFECTING PREGNANCY IN SECOND TRIMESTER: ICD-10-CM

## 2023-11-06 DIAGNOSIS — O10.012 PRE-EXISTING ESSENTIAL HYPERTENSION AFFECTING PREGNANCY IN SECOND TRIMESTER: ICD-10-CM

## 2023-11-06 DIAGNOSIS — Z86.32 HISTORY OF INSULIN CONTROLLED GESTATIONAL DIABETES MELLITUS (GDM): ICD-10-CM

## 2023-11-13 ENCOUNTER — PROCEDURE VISIT (OUTPATIENT)
Dept: MATERNAL FETAL MEDICINE | Facility: CLINIC | Age: 38
End: 2023-11-13
Payer: COMMERCIAL

## 2023-11-13 ENCOUNTER — LAB VISIT (OUTPATIENT)
Dept: LAB | Facility: HOSPITAL | Age: 38
End: 2023-11-13
Attending: INTERNAL MEDICINE
Payer: COMMERCIAL

## 2023-11-13 ENCOUNTER — OFFICE VISIT (OUTPATIENT)
Dept: MATERNAL FETAL MEDICINE | Facility: CLINIC | Age: 38
End: 2023-11-13
Payer: COMMERCIAL

## 2023-11-13 VITALS
SYSTOLIC BLOOD PRESSURE: 119 MMHG | WEIGHT: 200 LBS | HEART RATE: 110 BPM | DIASTOLIC BLOOD PRESSURE: 79 MMHG | BODY MASS INDEX: 35.44 KG/M2 | HEIGHT: 63 IN

## 2023-11-13 DIAGNOSIS — O09.292 HISTORY OF CERCLAGE, CURRENTLY PREGNANT, SECOND TRIMESTER: ICD-10-CM

## 2023-11-13 DIAGNOSIS — O99.212 OBESITY AFFECTING PREGNANCY IN SECOND TRIMESTER, UNSPECIFIED OBESITY TYPE: ICD-10-CM

## 2023-11-13 DIAGNOSIS — O10.012 PRE-EXISTING ESSENTIAL HYPERTENSION AFFECTING PREGNANCY IN SECOND TRIMESTER: ICD-10-CM

## 2023-11-13 DIAGNOSIS — O09.90 AT HIGH RISK FOR COMPLICATIONS OF INTRAUTERINE PREGNANCY (IUP): ICD-10-CM

## 2023-11-13 DIAGNOSIS — E78.2 MIXED HYPERLIPIDEMIA: ICD-10-CM

## 2023-11-13 DIAGNOSIS — O34.32 CERVICAL INSUFFICIENCY DURING PREGNANCY IN SECOND TRIMESTER, ANTEPARTUM: ICD-10-CM

## 2023-11-13 DIAGNOSIS — O09.522 MULTIGRAVIDA OF ADVANCED MATERNAL AGE IN SECOND TRIMESTER: ICD-10-CM

## 2023-11-13 DIAGNOSIS — I10 ESSENTIAL (PRIMARY) HYPERTENSION: ICD-10-CM

## 2023-11-13 DIAGNOSIS — O99.282 HYPOTHYROIDISM AFFECTING PREGNANCY IN SECOND TRIMESTER: ICD-10-CM

## 2023-11-13 DIAGNOSIS — O09.292 HX OF PREECLAMPSIA, PRIOR PREGNANCY, CURRENTLY PREGNANT, SECOND TRIMESTER: ICD-10-CM

## 2023-11-13 DIAGNOSIS — O41.8X20 SUBCHORIONIC HEMORRHAGE OF PLACENTA IN SECOND TRIMESTER, SINGLE OR UNSPECIFIED FETUS: ICD-10-CM

## 2023-11-13 DIAGNOSIS — Z86.32 HISTORY OF INSULIN CONTROLLED GESTATIONAL DIABETES MELLITUS (GDM): ICD-10-CM

## 2023-11-13 DIAGNOSIS — E03.9 HYPOTHYROIDISM AFFECTING PREGNANCY IN SECOND TRIMESTER: ICD-10-CM

## 2023-11-13 DIAGNOSIS — O09.90 AT HIGH RISK FOR COMPLICATIONS OF INTRAUTERINE PREGNANCY (IUP): Primary | ICD-10-CM

## 2023-11-13 DIAGNOSIS — O46.8X2 SUBCHORIONIC HEMORRHAGE OF PLACENTA IN SECOND TRIMESTER, SINGLE OR UNSPECIFIED FETUS: ICD-10-CM

## 2023-11-13 DIAGNOSIS — O26.22 HABITUAL ABORTER, CURRENTLY PREGNANT IN SECOND TRIMESTER: ICD-10-CM

## 2023-11-13 DIAGNOSIS — Z98.890 HISTORY OF CERCLAGE, CURRENTLY PREGNANT, SECOND TRIMESTER: ICD-10-CM

## 2023-11-13 DIAGNOSIS — Z00.00 WELLNESS EXAMINATION: ICD-10-CM

## 2023-11-13 LAB
ALBUMIN SERPL-MCNC: 3.1 G/DL (ref 3.5–5)
ALBUMIN/GLOB SERPL: 0.9 RATIO (ref 1.1–2)
ALP SERPL-CCNC: 60 UNIT/L (ref 40–150)
ALT SERPL-CCNC: 9 UNIT/L (ref 0–55)
AST SERPL-CCNC: 9 UNIT/L (ref 5–34)
BASOPHILS # BLD AUTO: 0.03 X10(3)/MCL
BASOPHILS NFR BLD AUTO: 0.5 %
BILIRUB SERPL-MCNC: 0.3 MG/DL
BUN SERPL-MCNC: 7.1 MG/DL (ref 7–18.7)
CALCIUM SERPL-MCNC: 9 MG/DL (ref 8.4–10.2)
CHLORIDE SERPL-SCNC: 106 MMOL/L (ref 98–107)
CHOLEST SERPL-MCNC: 247 MG/DL
CHOLEST/HDLC SERPL: 5 {RATIO} (ref 0–5)
CO2 SERPL-SCNC: 23 MMOL/L (ref 22–29)
CREAT SERPL-MCNC: 0.64 MG/DL (ref 0.55–1.02)
EOSINOPHIL # BLD AUTO: 0.06 X10(3)/MCL (ref 0–0.9)
EOSINOPHIL NFR BLD AUTO: 1 %
ERYTHROCYTE [DISTWIDTH] IN BLOOD BY AUTOMATED COUNT: 14.1 % (ref 11.5–17)
EST. AVERAGE GLUCOSE BLD GHB EST-MCNC: 99.7 MG/DL
GFR SERPLBLD CREATININE-BSD FMLA CKD-EPI: >60 MLS/MIN/1.73/M2
GLOBULIN SER-MCNC: 3.3 GM/DL (ref 2.4–3.5)
GLUCOSE SERPL-MCNC: 89 MG/DL (ref 74–100)
HBA1C MFR BLD: 5.1 %
HCT VFR BLD AUTO: 37.5 % (ref 37–47)
HDLC SERPL-MCNC: 50 MG/DL (ref 35–60)
HGB BLD-MCNC: 12.1 G/DL (ref 12–16)
IMM GRANULOCYTES # BLD AUTO: 0.03 X10(3)/MCL (ref 0–0.04)
IMM GRANULOCYTES NFR BLD AUTO: 0.5 %
LDLC SERPL CALC-MCNC: 163 MG/DL (ref 50–140)
LYMPHOCYTES # BLD AUTO: 1.79 X10(3)/MCL (ref 0.6–4.6)
LYMPHOCYTES NFR BLD AUTO: 29.4 %
MCH RBC QN AUTO: 29.2 PG (ref 27–31)
MCHC RBC AUTO-ENTMCNC: 32.3 G/DL (ref 33–36)
MCV RBC AUTO: 90.4 FL (ref 80–94)
MONOCYTES # BLD AUTO: 0.56 X10(3)/MCL (ref 0.1–1.3)
MONOCYTES NFR BLD AUTO: 9.2 %
NEUTROPHILS # BLD AUTO: 3.61 X10(3)/MCL (ref 2.1–9.2)
NEUTROPHILS NFR BLD AUTO: 59.4 %
NRBC BLD AUTO-RTO: 0 %
PLATELET # BLD AUTO: 275 X10(3)/MCL (ref 130–400)
PMV BLD AUTO: 10.4 FL (ref 7.4–10.4)
POTASSIUM SERPL-SCNC: 4 MMOL/L (ref 3.5–5.1)
PROT SERPL-MCNC: 6.4 GM/DL (ref 6.4–8.3)
RBC # BLD AUTO: 4.15 X10(6)/MCL (ref 4.2–5.4)
SODIUM SERPL-SCNC: 136 MMOL/L (ref 136–145)
TRIGL SERPL-MCNC: 169 MG/DL (ref 37–140)
TSH SERPL-ACNC: 1.26 UIU/ML (ref 0.35–4.94)
VLDLC SERPL CALC-MCNC: 34 MG/DL
WBC # SPEC AUTO: 6.08 X10(3)/MCL (ref 4.5–11.5)

## 2023-11-13 PROCEDURE — 36415 COLL VENOUS BLD VENIPUNCTURE: CPT

## 2023-11-13 PROCEDURE — 1159F MED LIST DOCD IN RCRD: CPT | Mod: CPTII,S$GLB,, | Performed by: OBSTETRICS & GYNECOLOGY

## 2023-11-13 PROCEDURE — 83036 HEMOGLOBIN GLYCOSYLATED A1C: CPT

## 2023-11-13 PROCEDURE — 3008F PR BODY MASS INDEX (BMI) DOCUMENTED: ICD-10-PCS | Mod: CPTII,S$GLB,, | Performed by: OBSTETRICS & GYNECOLOGY

## 2023-11-13 PROCEDURE — 76811 OB US DETAILED SNGL FETUS: CPT | Mod: S$GLB,,, | Performed by: OBSTETRICS & GYNECOLOGY

## 2023-11-13 PROCEDURE — 99213 OFFICE O/P EST LOW 20 MIN: CPT | Mod: S$GLB,,, | Performed by: OBSTETRICS & GYNECOLOGY

## 2023-11-13 PROCEDURE — 3074F SYST BP LT 130 MM HG: CPT | Mod: CPTII,S$GLB,, | Performed by: OBSTETRICS & GYNECOLOGY

## 2023-11-13 PROCEDURE — 3008F BODY MASS INDEX DOCD: CPT | Mod: CPTII,S$GLB,, | Performed by: OBSTETRICS & GYNECOLOGY

## 2023-11-13 PROCEDURE — 3078F PR MOST RECENT DIASTOLIC BLOOD PRESSURE < 80 MM HG: ICD-10-PCS | Mod: CPTII,S$GLB,, | Performed by: OBSTETRICS & GYNECOLOGY

## 2023-11-13 PROCEDURE — 85025 COMPLETE CBC W/AUTO DIFF WBC: CPT

## 2023-11-13 PROCEDURE — 76811 PR US, OB FETAL EVAL & EXAM, TRANSABDOM,FIRST GESTATION: ICD-10-PCS | Mod: S$GLB,,, | Performed by: OBSTETRICS & GYNECOLOGY

## 2023-11-13 PROCEDURE — 84443 ASSAY THYROID STIM HORMONE: CPT

## 2023-11-13 PROCEDURE — 82105 ALPHA-FETOPROTEIN SERUM: CPT

## 2023-11-13 PROCEDURE — 3078F DIAST BP <80 MM HG: CPT | Mod: CPTII,S$GLB,, | Performed by: OBSTETRICS & GYNECOLOGY

## 2023-11-13 PROCEDURE — 80053 COMPREHEN METABOLIC PANEL: CPT

## 2023-11-13 PROCEDURE — 99213 PR OFFICE/OUTPT VISIT, EST, LEVL III, 20-29 MIN: ICD-10-PCS | Mod: S$GLB,,, | Performed by: OBSTETRICS & GYNECOLOGY

## 2023-11-13 PROCEDURE — 1159F PR MEDICATION LIST DOCUMENTED IN MEDICAL RECORD: ICD-10-PCS | Mod: CPTII,S$GLB,, | Performed by: OBSTETRICS & GYNECOLOGY

## 2023-11-13 PROCEDURE — 80061 LIPID PANEL: CPT

## 2023-11-13 PROCEDURE — 3074F PR MOST RECENT SYSTOLIC BLOOD PRESSURE < 130 MM HG: ICD-10-PCS | Mod: CPTII,S$GLB,, | Performed by: OBSTETRICS & GYNECOLOGY

## 2023-11-13 RX ORDER — NAPROXEN SODIUM 220 MG/1
81 TABLET, FILM COATED ORAL 2 TIMES DAILY
Status: ON HOLD | COMMUNITY
End: 2024-03-14 | Stop reason: HOSPADM

## 2023-11-13 NOTE — PROGRESS NOTES
Maternal Fetal Medicine Follow Up Consult    Subjective     Patient ID: [id number]    Chief Complaint: M follow up with US (HTN.  )      HPI: Tawnya Love is a 38 y.o. female  at 20w2d gestation with Estimated Date of Delivery: 3/30/24  who is here for follow  up consultation by M.  She has history of chronic hypertension.  She was previously prescribed Procardia 30 b.i.d., but is currently not on antihypertensives. On 2023, 24 hour urine with <6.8 mg protein.  She has history of 3 pregnancy losses and had negative APS testing.   In 2014 pregnancy, she experienced cramping for a few hours and then delivered at 14 weeks gestation.  She was on progesterone earlier in pregnancy, discontinued at consult visit.  She had ultrasound indicated cerclage in her last pregnancy and was on nightly vaginal progesterone.   She delivered at 36 weeks in that pregnancy due to preeclampsia.  She is status post prophylactic cerclage on 2023.  She has increased BMI of 34 at consult visit. . She has history of gestational diabetes treated with metformin and insulin and also has history of PCOS.  She is history of hypothyroidism and was previously on Synthroid in her last pregnancy.  She is not currently medication.   On 2023, TSH 0.541 and normal early 1 hour GCT. She is of advanced maternal age and will be 38 by the EDC.  She already did cell free DNA that was done with primary OB, requested.  She had subchorionic hemorrhage consult ultrasound and denied any vaginal bleeding.  She is is on low-dose aspirin twice daily.         Interval history since last M visit: None.. She denies any leaking fluid, vaginal bleeding, contractions, decreased fetal movement. 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    Medications:  Current Outpatient Medications   Medication Instructions    aspirin 81 mg, Oral, 2 times daily    prenatal vit/iron fum/folic ac (PRENATAL 1+1 ORAL) Oral    progesterone (PROMETRIUM) 100 mg, Oral       Review of Systems   12 point review of systems conducted, negative except as stated in the history of present illness. See HPI for details.      Objective     Visit Vitals  /79 (BP Location: Left arm, Patient Position: Sitting, BP Method: Large (Automatic))   Pulse 110   Ht 5' 3" (1.6 m)   Wt 90.7 kg (200 lb)   BMI 35.43 kg/m²          Physical Exam  Vitals and nursing note reviewed.   Constitutional:       Appearance: Normal appearance.      Comments: Increased BMI   HENT:      Head: Normocephalic and atraumatic.      Nose: Nose normal. No congestion.   Cardiovascular:      Rate and Rhythm: Normal rate.   Pulmonary:      Effort: Pulmonary effort is normal.   Skin:     Findings: No rash.   Neurological:      Mental Status: She is alert and oriented to person, place, and time.   Psychiatric:         Mood and Affect: Mood normal.         Behavior: Behavior normal.         Thought Content: Thought content normal.         Judgment: Judgment " normal.           Assessment and Plan     38 y.o.  female with IUP at 20w2d    Chronic hypertension in pregnancy  Chronic hypertension complicates up to 2-5% of pregnancies and is associated with significant adverse pregnancy outcomes including  birth, fetal growth restriction, fetal demise, placental abruption, and  delivery.    BP Readings from Last 1 Encounters:   23 119/79   With this BP, she was advised to continue low salt diet, and hold off on antihypertensive medication at this time.     Low dose aspirin as discussed.    We will plan to do follow-up ultrasounds to monitor growth around 24-26 weeks and then every 4 weeks.  Recommend fetal testing starting around 32 weeks gestation until the end of pregnancy.     Among patients with uncomplicated chronic hypertension with no additional risk factors, delivery at 38-39 weeks appears to provide optimal balance between the risk of adverse fetal and  outcomes. If no medication and normal fetal assessment, recommend delivery at 39 weeks. However, will reassess closer to EDC to determine optimal timeframe or GA for delivery, based on current evaluation at that time.       History of cervical insufficiency and cerclage, s/p prophylactic cerclage  TVUS on 10/16/2023 with normal closed cervix 3.4 cm without funneling at the IO. There is no need for further vaginal ultrasounds at this time. PTL precautions given.    She is aware of risks associated with cerclage placement and that goal is to decrease risk of  labor and delivery. She is to continue limited activity/home rest with ambulation/exercising BLE for DVT prophylaxis, no tub baths, and pelvic rest. She was instructed to report immediately any abdominal cramping, vaginal bleeding or change in discharge.     Will plan for cerclage removal at 36-37 weeks, unless indicated earlier.  PTL precautions reviewed.        Advanced maternal age  Reviewed risks with AMA, including  risks for PTL, FGR, anomalies not seen, aneuploidy and stillbirth at term. She previously declined amniocentesis . She is aware of need for  evaluation. She previously had cell free DNA done with primary OB, requested.    She was advised to do fetal kick counts after 24 weeks and continue till delivery in view of the higher risk of fetal demise in utero closer to term with advanced maternal age.     Fetal surveillance as above.      History of recurrent miscarriages  She previously had negative APS testing.  Expectant management.  PTL precautions given.  Fetal kick count instructions reviewed.        Increased BMI with history of gestational diabetes  Body mass index is 35.43 kg/m². With reasonable weight gain since last visit, she was advised to continue healthy and low caloric diet.  Excess weight gain would be associated with gestational hypertension, gestational diabetes and adverse  outcomes, including fetal demise in utero.    With risk factors associated with increased BMI,  will start fetal kick counts at 24 weeks and continue till delivery.    It is important to lose weight after the pregnancy is over, especially before a future pregnancy was discussed. Breastfeeding may be an important tool in reducing the postpartum weight retention. Fetal risks were discussed with short term risk of fetal/ obesity and long term risk of adolescent component of metabolic syndrome.    With higher risks for gestational diabetes with BMI greater than 30, she had normal early 1hr GCT. Recommend repeat 1hr GCT around 26-28 weeks gestation.       Hypothyroidism in pregnancy  Risks associated with uncontrolled hypothyroidism, including potential for pregnancy loss, neurologic/cognitive deficit in the baby,  delivery, LBW,  delivery, and gestational hypertension/preeclampsia were previously discussed.    Currently, it is recommended to do trimester specific thyroid testing monitoring in  pregnancy with TSH for adequate management and dosing of medication. Thyroid levels for each trimester are as follows:   1st trimester: TSH - 0.1- 2.5   2nd trimester: TSH 0.2- 3.0   3rd trimester: TSH 0.3- 3.0  Free T4 levels may be unreliable in pregnancy, in addition to varying lab reference ranges. Although, abnormality 1.5 fold outside of reference range is suggestive of abnormality.    The prognosis should be quite good with adequately controlled hypothyroidism on medicine. In hypothyroidism, TSH should be monitored, and levothyroxine dose adjusted every 4 weeks until the TSH levels have reached the trimester-specific target. Once adequate dose is established, TSH should be checked every trimester during pregnancy. She was instructed to report any symptoms of cold/heat intolerance, heart palpitations, fatigue or constipation.  With normal TSH, advised patient to  stay off of Synthroid  at this time.  We will plan to recheck TSH around 24-28 weeks.      At high risk for preeclampsia  With her increased risk for preeclampsia, she agreed to start aspirin today and continue asa 81 mg BID until 34 6/7 weeks, then decrease to once daily until delivery.. Preeclampsia precautions reviewed.      Subchorionic hemorrhage, not seen  There is no prevention available and advised of need for pelvic rest x 2 weeks from last bleeding episode with expectant management will be done and advised to report any bleeding or increased cramps mainly in the second half of the pregnancy.    Reiterated importance of healthy diet and limiting weight gain to proper weight gain recommendations.  Fetal anatomy scan reveals no anomalies with suboptimal views of the spine.  She will continue aspirin b.i.d..  Her cell free DNA is negative and has still decided against amniocentesis.  MSAFP was done this morning.  We will plan to see her in 5 weeks to complete anatomy scan.      Follow up in about 5 weeks (around 12/18/2023) for MFM follow-up,  Repeat ultrasound, Room 1 or 2, to complete anatomy scan.     Future Appointments   Date Time Provider Department Center   11/29/2023  3:20 PM Shawna Stewart MD [facility]   12/18/2023  1:00 PM Tobias Alfonso MD [facility]   12/18/2023  1:00 PM ROOM 1 AND 2, [facility]      Patient was evaluated and examined by Dr. Alfonso. MARIBEL Shah, helped in pre charting of part of note.     Components of this note were documented using voice recognition systems; and are subject to errors not corrected at proofreading. Please contact the author for any clarifications.

## 2023-11-15 LAB — MAYO GENERIC ORDERABLE RESULT: NORMAL

## 2023-11-29 ENCOUNTER — OFFICE VISIT (OUTPATIENT)
Dept: INTERNAL MEDICINE | Facility: CLINIC | Age: 38
End: 2023-11-29
Payer: COMMERCIAL

## 2023-11-29 DIAGNOSIS — Z00.00 WELL ADULT EXAM: Primary | Chronic | ICD-10-CM

## 2023-11-29 PROCEDURE — 99395 PR PREVENTIVE VISIT,EST,18-39: ICD-10-PCS | Mod: ,,, | Performed by: INTERNAL MEDICINE

## 2023-11-29 PROCEDURE — 1160F RVW MEDS BY RX/DR IN RCRD: CPT | Mod: CPTII,,, | Performed by: INTERNAL MEDICINE

## 2023-11-29 PROCEDURE — 3044F HG A1C LEVEL LT 7.0%: CPT | Mod: CPTII,,, | Performed by: INTERNAL MEDICINE

## 2023-11-29 PROCEDURE — 1159F MED LIST DOCD IN RCRD: CPT | Mod: CPTII,,, | Performed by: INTERNAL MEDICINE

## 2023-11-29 PROCEDURE — 1160F PR REVIEW ALL MEDS BY PRESCRIBER/CLIN PHARMACIST DOCUMENTED: ICD-10-PCS | Mod: CPTII,,, | Performed by: INTERNAL MEDICINE

## 2023-11-29 PROCEDURE — 1159F PR MEDICATION LIST DOCUMENTED IN MEDICAL RECORD: ICD-10-PCS | Mod: CPTII,,, | Performed by: INTERNAL MEDICINE

## 2023-11-29 PROCEDURE — 3044F PR MOST RECENT HEMOGLOBIN A1C LEVEL <7.0%: ICD-10-PCS | Mod: CPTII,,, | Performed by: INTERNAL MEDICINE

## 2023-11-29 PROCEDURE — 99395 PREV VISIT EST AGE 18-39: CPT | Mod: ,,, | Performed by: INTERNAL MEDICINE

## 2023-11-29 NOTE — PROGRESS NOTES
Subjective:      Patient ID: Tawnya Love is a 38 y.o. female.    Chief Complaint: Annual Exam (Annual wellness- no complaints no concerns )    Tawnya is a 37-year-old female here today for wellness visit.   No chronic medical comorbidities as such.     Twenty-two weeks pregnant with her 2nd baby at this time.  Labs are reviewed and noted to be essentially normal except for mild elevation in total cholesterol and LDL however no acute intervention is deemed necessary.  Blood pressure is well controlled, patient currently not on any medication          GYN: Dr. Emanuel    Cardiology: Dr. Kwan   Dermatology: Dr. Reynoso       The patient's Health Maintenance was reviewed and the following appears to be due at this time:   Health Maintenance Due   Topic Date Due    COVID-19 Vaccine (1) Never done    TETANUS VACCINE  2012    Cervical Cancer Screening  2024        Past Medical History:  Past Medical History:   Diagnosis Date    Essential (primary) hypertension     Mixed hyperlipidemia     Personal history of gestational diabetes      labor in second trimester with  delivery in second trimester     Thyroid disease     Well adult exam      Past Surgical History:   Procedure Laterality Date    CERVICAL CERCLAGE N/A 2022    DILATION AND CURETTAGE OF UTERUS       Review of patient's allergies indicates:  No Known Allergies  Social History     Socioeconomic History    Marital status:    Tobacco Use    Smoking status: Never     Passive exposure: Never    Smokeless tobacco: Never   Substance and Sexual Activity    Alcohol use: Not Currently    Drug use: Never    Sexual activity: Yes     Partners: Male     Birth control/protection: None     Family History   Problem Relation Age of Onset    No Known Problems Mother     Hypertension Father     Hyperlipidemia Father        Review of Systems    A comprehensive review of systems was performed and is negative except for that stated  "above  Objective:   BP (P) 126/78 (BP Location: Right arm)   Pulse (P) 99   Ht (P) 5' 3" (1.6 m)   Wt (P) 92.5 kg (204 lb)   SpO2 (P) 99%   BMI (P) 36.14 kg/m²     Physical Exam  Constitutional:       Appearance: Normal appearance.   HENT:      Head: Normocephalic and atraumatic.      Nose: Nose normal.      Mouth/Throat:      Mouth: Mucous membranes are moist.      Pharynx: Oropharynx is clear.   Eyes:      Extraocular Movements: Extraocular movements intact.      Pupils: Pupils are equal, round, and reactive to light.   Cardiovascular:      Rate and Rhythm: Normal rate and regular rhythm.      Pulses: Normal pulses.   Pulmonary:      Effort: Pulmonary effort is normal.      Breath sounds: Normal breath sounds.   Abdominal:      General: Bowel sounds are normal.      Palpations: Abdomen is soft.   Musculoskeletal:         General: Normal range of motion.      Cervical back: Normal range of motion and neck supple.   Skin:     General: Skin is warm.   Neurological:      General: No focal deficit present.      Mental Status: She is alert and oriented to person, place, and time. Mental status is at baseline.   Psychiatric:         Mood and Affect: Mood normal.       Assessment/ Plan:   1. Well adult exam  Assessment & Plan:  -labs are reviewed all essentially normal except mild elevation in total cholesterol, LDL and triglycerides however patient is currently pregnant  -patient is advised on importance of watching her carbohydrate intake and saturated fat intake, making the right nutritional choices and exercising on a regular basis  -up-to-date with the screening          "

## 2023-11-29 NOTE — ASSESSMENT & PLAN NOTE
-labs are reviewed all essentially normal except mild elevation in total cholesterol, LDL and triglycerides however patient is currently pregnant  -patient is advised on importance of watching her carbohydrate intake and saturated fat intake, making the right nutritional choices and exercising on a regular basis  -up-to-date with the screening

## 2023-12-14 ENCOUNTER — LAB VISIT (OUTPATIENT)
Dept: LAB | Facility: HOSPITAL | Age: 38
End: 2023-12-14
Attending: STUDENT IN AN ORGANIZED HEALTH CARE EDUCATION/TRAINING PROGRAM
Payer: COMMERCIAL

## 2023-12-14 DIAGNOSIS — Z34.92 SECOND TRIMESTER PREGNANCY: ICD-10-CM

## 2023-12-14 DIAGNOSIS — R73.02 IMPAIRED GLUCOSE TOLERANCE ASSOCIATED WITH GENETIC SYNDROME: Primary | ICD-10-CM

## 2023-12-14 LAB
ERYTHROCYTE [DISTWIDTH] IN BLOOD BY AUTOMATED COUNT: 14 % (ref 11.5–17)
GLUCOSE 1H P 100 G GLC PO SERPL-MCNC: 189 MG/DL (ref 100–180)
GLUCOSE 2H P 100 G GLC PO SERPL-MCNC: 131 MG/DL (ref 70–140)
GLUCOSE 3H P 100 G GLC PO SERPL-MCNC: 127 MG/DL (ref 70–115)
GLUCOSE P FAST SERPL-MCNC: 91 MG/DL (ref 70–100)
HCT VFR BLD AUTO: 34.3 % (ref 37–47)
HGB BLD-MCNC: 11 G/DL (ref 12–16)
MCH RBC QN AUTO: 29 PG (ref 27–31)
MCHC RBC AUTO-ENTMCNC: 32.1 G/DL (ref 33–36)
MCV RBC AUTO: 90.5 FL (ref 80–94)
NRBC BLD AUTO-RTO: 0 %
PATH REV: NORMAL
PLATELET # BLD AUTO: 256 X10(3)/MCL (ref 130–400)
PMV BLD AUTO: 10.2 FL (ref 7.4–10.4)
POCT GLUCOSE: 89 MG/DL (ref 70–110)
RBC # BLD AUTO: 3.79 X10(6)/MCL (ref 4.2–5.4)
WBC # SPEC AUTO: 6.4 X10(3)/MCL (ref 4.5–11.5)

## 2023-12-14 PROCEDURE — 82947 ASSAY GLUCOSE BLOOD QUANT: CPT

## 2023-12-14 PROCEDURE — 82950 GLUCOSE TEST: CPT

## 2023-12-14 PROCEDURE — 82951 GLUCOSE TOLERANCE TEST (GTT): CPT

## 2023-12-14 PROCEDURE — 82952 GTT-ADDED SAMPLES: CPT

## 2023-12-14 PROCEDURE — 36415 COLL VENOUS BLD VENIPUNCTURE: CPT

## 2023-12-14 PROCEDURE — 85027 COMPLETE CBC AUTOMATED: CPT

## 2023-12-15 DIAGNOSIS — E03.9 HYPOTHYROIDISM AFFECTING PREGNANCY IN SECOND TRIMESTER: ICD-10-CM

## 2023-12-15 DIAGNOSIS — O09.522 MULTIGRAVIDA OF ADVANCED MATERNAL AGE IN SECOND TRIMESTER: Primary | ICD-10-CM

## 2023-12-15 DIAGNOSIS — Z86.32 HISTORY OF INSULIN CONTROLLED GESTATIONAL DIABETES MELLITUS (GDM): ICD-10-CM

## 2023-12-15 DIAGNOSIS — O34.32 CERVICAL INSUFFICIENCY DURING PREGNANCY IN SECOND TRIMESTER, ANTEPARTUM: ICD-10-CM

## 2023-12-15 DIAGNOSIS — O99.282 HYPOTHYROIDISM AFFECTING PREGNANCY IN SECOND TRIMESTER: ICD-10-CM

## 2023-12-15 DIAGNOSIS — O09.90 AT HIGH RISK FOR COMPLICATIONS OF INTRAUTERINE PREGNANCY (IUP): ICD-10-CM

## 2023-12-15 DIAGNOSIS — O10.012 PRE-EXISTING ESSENTIAL HYPERTENSION AFFECTING PREGNANCY IN SECOND TRIMESTER: ICD-10-CM

## 2023-12-18 ENCOUNTER — OFFICE VISIT (OUTPATIENT)
Dept: MATERNAL FETAL MEDICINE | Facility: CLINIC | Age: 38
End: 2023-12-18
Payer: COMMERCIAL

## 2023-12-18 ENCOUNTER — PROCEDURE VISIT (OUTPATIENT)
Dept: MATERNAL FETAL MEDICINE | Facility: CLINIC | Age: 38
End: 2023-12-18
Payer: COMMERCIAL

## 2023-12-18 VITALS
HEART RATE: 99 BPM | HEIGHT: 63 IN | SYSTOLIC BLOOD PRESSURE: 106 MMHG | DIASTOLIC BLOOD PRESSURE: 70 MMHG | WEIGHT: 203 LBS | BODY MASS INDEX: 35.97 KG/M2

## 2023-12-18 DIAGNOSIS — O99.282 HYPOTHYROIDISM AFFECTING PREGNANCY IN SECOND TRIMESTER: ICD-10-CM

## 2023-12-18 DIAGNOSIS — O34.32 CERVICAL INSUFFICIENCY DURING PREGNANCY IN SECOND TRIMESTER, ANTEPARTUM: ICD-10-CM

## 2023-12-18 DIAGNOSIS — E03.9 HYPOTHYROIDISM AFFECTING PREGNANCY IN SECOND TRIMESTER: ICD-10-CM

## 2023-12-18 DIAGNOSIS — O10.012 PRE-EXISTING ESSENTIAL HYPERTENSION AFFECTING PREGNANCY IN SECOND TRIMESTER: ICD-10-CM

## 2023-12-18 DIAGNOSIS — O09.292 HX OF PREECLAMPSIA, PRIOR PREGNANCY, CURRENTLY PREGNANT, SECOND TRIMESTER: ICD-10-CM

## 2023-12-18 DIAGNOSIS — O09.522 MULTIGRAVIDA OF ADVANCED MATERNAL AGE IN SECOND TRIMESTER: ICD-10-CM

## 2023-12-18 DIAGNOSIS — Z98.890 HISTORY OF CERCLAGE, CURRENTLY PREGNANT, SECOND TRIMESTER: ICD-10-CM

## 2023-12-18 DIAGNOSIS — E74.89: ICD-10-CM

## 2023-12-18 DIAGNOSIS — O99.212 OBESITY AFFECTING PREGNANCY IN SECOND TRIMESTER, UNSPECIFIED OBESITY TYPE: ICD-10-CM

## 2023-12-18 DIAGNOSIS — O09.90 AT HIGH RISK FOR COMPLICATIONS OF INTRAUTERINE PREGNANCY (IUP): Primary | ICD-10-CM

## 2023-12-18 DIAGNOSIS — O09.292 HISTORY OF CERCLAGE, CURRENTLY PREGNANT, SECOND TRIMESTER: ICD-10-CM

## 2023-12-18 DIAGNOSIS — Z86.32 HISTORY OF INSULIN CONTROLLED GESTATIONAL DIABETES MELLITUS (GDM): ICD-10-CM

## 2023-12-18 DIAGNOSIS — O09.90 AT HIGH RISK FOR COMPLICATIONS OF INTRAUTERINE PREGNANCY (IUP): ICD-10-CM

## 2023-12-18 DIAGNOSIS — O26.22 HABITUAL ABORTER, CURRENTLY PREGNANT IN SECOND TRIMESTER: ICD-10-CM

## 2023-12-18 PROBLEM — O46.8X2 SUBCHORIONIC HEMORRHAGE OF PLACENTA IN SECOND TRIMESTER: Status: RESOLVED | Noted: 2023-09-18 | Resolved: 2023-12-18

## 2023-12-18 PROBLEM — O41.8X20 SUBCHORIONIC HEMORRHAGE OF PLACENTA IN SECOND TRIMESTER: Status: RESOLVED | Noted: 2023-09-18 | Resolved: 2023-12-18

## 2023-12-18 PROCEDURE — 3078F DIAST BP <80 MM HG: CPT | Mod: CPTII,S$GLB,, | Performed by: OBSTETRICS & GYNECOLOGY

## 2023-12-18 PROCEDURE — 99214 PR OFFICE/OUTPT VISIT, EST, LEVL IV, 30-39 MIN: ICD-10-PCS | Mod: S$GLB,,, | Performed by: OBSTETRICS & GYNECOLOGY

## 2023-12-18 PROCEDURE — 1159F PR MEDICATION LIST DOCUMENTED IN MEDICAL RECORD: ICD-10-PCS | Mod: CPTII,S$GLB,, | Performed by: OBSTETRICS & GYNECOLOGY

## 2023-12-18 PROCEDURE — 3008F PR BODY MASS INDEX (BMI) DOCUMENTED: ICD-10-PCS | Mod: CPTII,S$GLB,, | Performed by: OBSTETRICS & GYNECOLOGY

## 2023-12-18 PROCEDURE — 3044F HG A1C LEVEL LT 7.0%: CPT | Mod: CPTII,S$GLB,, | Performed by: OBSTETRICS & GYNECOLOGY

## 2023-12-18 PROCEDURE — 99214 OFFICE O/P EST MOD 30 MIN: CPT | Mod: S$GLB,,, | Performed by: OBSTETRICS & GYNECOLOGY

## 2023-12-18 PROCEDURE — 76816 PR  US,PREGNANT UTERUS,F/U,TRANSABD APP: ICD-10-PCS | Mod: S$GLB,,, | Performed by: OBSTETRICS & GYNECOLOGY

## 2023-12-18 PROCEDURE — 76816 OB US FOLLOW-UP PER FETUS: CPT | Mod: S$GLB,,, | Performed by: OBSTETRICS & GYNECOLOGY

## 2023-12-18 PROCEDURE — 1159F MED LIST DOCD IN RCRD: CPT | Mod: CPTII,S$GLB,, | Performed by: OBSTETRICS & GYNECOLOGY

## 2023-12-18 PROCEDURE — 3008F BODY MASS INDEX DOCD: CPT | Mod: CPTII,S$GLB,, | Performed by: OBSTETRICS & GYNECOLOGY

## 2023-12-18 PROCEDURE — 3078F PR MOST RECENT DIASTOLIC BLOOD PRESSURE < 80 MM HG: ICD-10-PCS | Mod: CPTII,S$GLB,, | Performed by: OBSTETRICS & GYNECOLOGY

## 2023-12-18 PROCEDURE — 3074F SYST BP LT 130 MM HG: CPT | Mod: CPTII,S$GLB,, | Performed by: OBSTETRICS & GYNECOLOGY

## 2023-12-18 PROCEDURE — 3074F PR MOST RECENT SYSTOLIC BLOOD PRESSURE < 130 MM HG: ICD-10-PCS | Mod: CPTII,S$GLB,, | Performed by: OBSTETRICS & GYNECOLOGY

## 2023-12-18 PROCEDURE — 3044F PR MOST RECENT HEMOGLOBIN A1C LEVEL <7.0%: ICD-10-PCS | Mod: CPTII,S$GLB,, | Performed by: OBSTETRICS & GYNECOLOGY

## 2023-12-18 RX ORDER — INSULIN PUMP SYRINGE, 3 ML
EACH MISCELLANEOUS
Qty: 1 EACH | Refills: 0 | Status: ON HOLD | OUTPATIENT
Start: 2023-12-18 | End: 2024-03-14 | Stop reason: HOSPADM

## 2023-12-18 RX ORDER — BLOOD-GLUCOSE CONTROL, NORMAL
1 EACH MISCELLANEOUS 4 TIMES DAILY
Qty: 100 EACH | Refills: 4 | Status: SHIPPED | OUTPATIENT
Start: 2023-12-18 | End: 2024-03-04 | Stop reason: SDUPTHER

## 2023-12-18 NOTE — PROGRESS NOTES
Maternal Fetal Medicine Follow Up Consult    Subjective     Patient ID: [id number]    Chief Complaint: M follow up with US (HTN, GDM.)      HPI: Tawnya Love is a 38 y.o. female  at 25w2d gestation with Estimated Date of Delivery: 3/30/24  who is here for follow  up consultation by MFM.  She has history of chronic hypertension.  She is currently not on antihypertensives. On 2023, 24 hour urine with <6.8 mg protein.  She has history of 3 pregnancy losses and had negative APS testing.   In 2014 pregnancy, she experienced cramping for a few hours and then delivered at 14 weeks gestation. She had ultrasound indicated cerclage in her last pregnancy and was on nightly vaginal progesterone.   She delivered at 36 weeks in that pregnancy due to preeclampsia.  She is status post prophylactic cerclage on 2023.  She has increased BMI of 34 at consult visit. . She has history of gestational diabetes treated with metformin and insulin and also has history of PCOS.  On 2023, she had 3 hour GTT with 1 elevated value (1 hour 198).  She is history of hypothyroidism and was previously on Synthroid in her last pregnancy.  She is not currently medication.  On 2023, TSH was normal at 1.256.  She is of advanced maternal age and will be 38 by the [facility].  She had negative cell free DNA. She had subchorionic hemorrhage consult ultrasound, not seen on follow-up ultrasound, and denied any vaginal bleeding.  She is is on low-dose aspirin twice daily.         Interval history since last M visit: None.. She denies any leaking fluid, vaginal bleeding, contractions, decreased fetal movement. 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    Medications:  Current Outpatient Medications   Medication Instructions    aspirin 81 mg, Oral, 2 times daily    prenatal vit/iron fum/folic ac (PRENATAL 1+1 ORAL) Oral       Review of Systems   12 point review of systems conducted, negative except as stated in the history of present illness. See HPI for details.      Objective     Visit Vitals  /70 (BP Location: Right arm, Patient Position: Sitting, BP Method: Large (Automatic))   Pulse 99   Ht 5' 3" (1.6 m)   Wt 92.1 kg (203 lb)   BMI 35.96 kg/m²          Physical Exam  Vitals and nursing note reviewed.   Constitutional:       Appearance: Normal appearance.      Comments: Increased BMI   HENT:      Head: Normocephalic and atraumatic.      Nose: Nose normal. No congestion.   Cardiovascular:      Rate and Rhythm: Normal rate.   Pulmonary:      Effort: Pulmonary effort is normal.   Skin:     Findings: No rash.   Neurological:      Mental Status: She is alert and oriented to person, place, and time.   Psychiatric:         Mood and Affect: Mood normal.         Behavior: Behavior normal.         Thought Content: Thought content normal.         Judgment: Judgment normal.         Assessment and Plan     38 y.o.  female with IUP at 25w2d    Chronic hypertension in pregnancy  There is normal fetal " growth with an EFW of 815 g at the 50% and the AC at the 49% on 2023.  AFV is normal.     Chronic hypertension complicates up to 2-5% of pregnancies and is associated with significant adverse pregnancy outcomes including  birth, fetal growth restriction, fetal demise, placental abruption, and  delivery.    BP Readings from Last 1 Encounters:   23 106/70   With this BP, she was advised to continue low salt diet, and hold off on antihypertensive medication at this time.     Low dose aspirin as discussed.    We will plan to do follow-up ultrasounds to monitor growth around 24-26 weeks and then every 4 weeks.  Recommend fetal testing starting around 32 weeks gestation until the end of pregnancy.     Among patients with uncomplicated chronic hypertension with no additional risk factors, delivery at 38-39 weeks appears to provide optimal balance between the risk of adverse fetal and  outcomes. If no medication and normal fetal assessment, recommend delivery at 39 weeks. However, will reassess closer to EDC to determine optimal timeframe or GA for delivery, based on current evaluation at that time.       History of cervical insufficiency and cerclage, s/p prophylactic cerclage  TVUS on 10/16/2023 with normal closed cervix 3.4 cm without funneling at the IO. There is no need for further vaginal ultrasounds at this time. PTL precautions given.    She is aware of risks associated with cerclage placement and that goal is to decrease risk of  labor and delivery. She is to continue limited activity/home rest with ambulation/exercising BLE for DVT prophylaxis, no tub baths, and pelvic rest. She was instructed to report immediately any abdominal cramping, vaginal bleeding or change in discharge.     Will plan for cerclage removal at 36-37 weeks, unless indicated earlier.  PTL precautions reviewed.        Advanced maternal age  Reviewed risks with AMA, including risks for PTL, FGR,  anomalies not seen, aneuploidy and stillbirth at term. She previously declined amniocentesis . She is aware of need for  evaluation. She previously had cell free DNA done with primary OB, requested.    She was advised to do fetal kick counts after 24 weeks and continue till delivery in view of the higher risk of fetal demise in utero closer to term with advanced maternal age.     Fetal surveillance as above.      History of recurrent miscarriages  She previously had negative APS testing.  Expectant management.  PTL precautions given.  Fetal kick count instructions reviewed.        Increased BMI with history of gestational diabetes  Body mass index is 35.96 kg/m². With reasonable weight gain since last visit, she was advised to continue healthy and low caloric diet.  Excess weight gain would be associated with gestational hypertension, gestational diabetes and adverse  outcomes, including fetal demise in utero.    With risk factors associated with increased BMI,  will start fetal kick counts at 24 weeks and continue till delivery.    It is important to lose weight after the pregnancy is over, especially before a future pregnancy was discussed. Breastfeeding may be an important tool in reducing the postpartum weight retention. Fetal risks were discussed with short term risk of fetal/ obesity and long term risk of adolescent component of metabolic syndrome.    With higher risks for gestational diabetes with BMI greater than 30, she had normal early 1hr GCT. Recommend repeat 1hr GCT around 26-28 weeks gestation.       Hypothyroidism in pregnancy  Risks associated with uncontrolled hypothyroidism, including potential for pregnancy loss, neurologic/cognitive deficit in the baby,  delivery, LBW,  delivery, and gestational hypertension/preeclampsia were previously discussed.    Currently, it is recommended to do trimester specific thyroid testing monitoring in pregnancy with TSH for  adequate management and dosing of medication. Thyroid levels for each trimester are as follows:   1st trimester: TSH - 0.1- 2.5   2nd trimester: TSH 0.2- 3.0   3rd trimester: TSH 0.3- 3.0  Free T4 levels may be unreliable in pregnancy, in addition to varying lab reference ranges. Although, abnormality 1.5 fold outside of reference range is suggestive of abnormality.    The prognosis should be quite good with adequately controlled hypothyroidism on medicine. In hypothyroidism, TSH should be monitored, and levothyroxine dose adjusted every 4 weeks until the TSH levels have reached the trimester-specific target. Once adequate dose is established, TSH should be checked every trimester during pregnancy. She was instructed to report any symptoms of cold/heat intolerance, heart palpitations, fatigue or constipation.  With normal TSH, advised patient to  stay off of Synthroid  at this time.  We will plan to recheck TSH at this time.      At high risk for preeclampsia  With her increased risk for preeclampsia, she agreed to start aspirin today and continue asa 81 mg BID until 34 6/7 weeks, then decrease to once daily until delivery.. Preeclampsia precautions reviewed.      Abnormal carbohydrate metabolism  Discussed that abnormal carbohydrate metabolism, with 1 abnormal value on 3 hr GTT is considered on the spectrum of GDM. It has been linked to macrosomia, maternal hypertensive disorder and morbidity. It should still be monitored closely with diabetic diet and blood sugar monitoring. Will have patient check blood sugars at home 4 times a day for a week and if all normal decrease the checks t to few times a week on a rotating schedule fastings and postprandials.     If abnormal blood glucose readings found, then manage as gestational diabetes and treatment if indicated.  Abnormal carbohydrate metabolism is on the spectrum of diabetes and as such will increase the risk of type 2 diabetes in the future.  Importance after  pregnancy over to follow diet, exercise, lose any weight gain and try to get as close to optimal weight as possible would be helpful in decreasing the risk of type 2 diabetes in the future.      No follow-ups on file.     Future Appointments   Date Time Provider Department Center   12/2/2024  2:20 PM Shawna Stewart [place] [id number]   Patient had 1 abnormal value and 1 borderline value on 3 hour glucose tolerance test consistent with the abnormal carbohydrate metabolism but not enough to call it gestational diabetes mellitus yet.  Discussed with her that this finding is on the spectrum of gestational diabetes and happening at this gestational age at 25 weeks as the placenta growth most likely sugars we will get elevated and will require treatment.  We are starting patient on monitoring her blood sugars and then we will see her again in 5 weeks unless the sugars get elevated she will call us and we will see her sooner and start her on some treatment.  Importance of diet exercise losing weight after pregnancy over decrease the risk of type 2 diabetes discussed.  Continue aspirin b.i.d. at this time.        Patient was evaluated and examined by Dr. Alfonso. MARIBEL Shah, helped in pre charting of part of note.     Components of this note were documented using voice recognition systems; and are subject to errors not corrected at proofreading. Please contact the author for any clarifications.

## 2024-01-02 ENCOUNTER — PATIENT MESSAGE (OUTPATIENT)
Dept: MATERNAL FETAL MEDICINE | Facility: CLINIC | Age: 39
End: 2024-01-02
Payer: COMMERCIAL

## 2024-01-02 DIAGNOSIS — O10.012 PRE-EXISTING ESSENTIAL HYPERTENSION AFFECTING PREGNANCY IN SECOND TRIMESTER: Primary | ICD-10-CM

## 2024-01-11 ENCOUNTER — LAB VISIT (OUTPATIENT)
Dept: LAB | Facility: HOSPITAL | Age: 39
End: 2024-01-11
Attending: OBSTETRICS & GYNECOLOGY
Payer: COMMERCIAL

## 2024-01-11 DIAGNOSIS — O99.282 HYPOTHYROIDISM AFFECTING PREGNANCY IN SECOND TRIMESTER: ICD-10-CM

## 2024-01-11 DIAGNOSIS — E03.9 HYPOTHYROIDISM AFFECTING PREGNANCY IN SECOND TRIMESTER: ICD-10-CM

## 2024-01-11 LAB — TSH SERPL-ACNC: 0.91 UIU/ML (ref 0.35–4.94)

## 2024-01-11 PROCEDURE — 36415 COLL VENOUS BLD VENIPUNCTURE: CPT

## 2024-01-11 PROCEDURE — 84443 ASSAY THYROID STIM HORMONE: CPT

## 2024-01-12 ENCOUNTER — PATIENT MESSAGE (OUTPATIENT)
Dept: MATERNAL FETAL MEDICINE | Facility: CLINIC | Age: 39
End: 2024-01-12
Payer: COMMERCIAL

## 2024-01-12 ENCOUNTER — TELEPHONE (OUTPATIENT)
Dept: MATERNAL FETAL MEDICINE | Facility: CLINIC | Age: 39
End: 2024-01-12
Payer: COMMERCIAL

## 2024-01-12 NOTE — TELEPHONE ENCOUNTER
----- Message from Xiao Cooper PA-C sent at 1/12/2024  7:59 AM CST -----  Please notify patient of below:    1. TSH was normal.  No need for medication.    Called pt no answer left vm     PT NOTIFIED

## 2024-01-16 DIAGNOSIS — O24.414 INSULIN CONTROLLED GESTATIONAL DIABETES MELLITUS (GDM) IN THIRD TRIMESTER: Primary | ICD-10-CM

## 2024-01-16 DIAGNOSIS — O24.414 INSULIN CONTROLLED GESTATIONAL DIABETES MELLITUS (GDM) IN THIRD TRIMESTER: ICD-10-CM

## 2024-01-16 DIAGNOSIS — Z86.32 HISTORY OF INSULIN CONTROLLED GESTATIONAL DIABETES MELLITUS (GDM): Primary | ICD-10-CM

## 2024-01-16 RX ORDER — INSULIN ASPART 100 [IU]/ML
INJECTION, SOLUTION INTRAVENOUS; SUBCUTANEOUS
Qty: 10 ML | Refills: 3 | Status: SHIPPED | OUTPATIENT
Start: 2024-01-16 | End: 2024-01-16

## 2024-01-16 RX ORDER — INSULIN LISPRO 100 [IU]/ML
INJECTION, SOLUTION INTRAVENOUS; SUBCUTANEOUS
Qty: 10 ML | Refills: 3 | Status: SHIPPED | OUTPATIENT
Start: 2024-01-16 | End: 2024-01-22 | Stop reason: SDUPTHER

## 2024-01-16 RX ORDER — HUMAN INSULIN 100 [IU]/ML
INJECTION, SUSPENSION SUBCUTANEOUS
Qty: 10 ML | Refills: 3 | Status: SHIPPED | OUTPATIENT
Start: 2024-01-16 | End: 2024-01-22 | Stop reason: SDUPTHER

## 2024-01-16 RX ORDER — HUMAN INSULIN 100 [IU]/ML
INJECTION, SUSPENSION SUBCUTANEOUS
Qty: 10 ML | Refills: 3 | Status: SHIPPED | OUTPATIENT
Start: 2024-01-16 | End: 2024-01-16 | Stop reason: SDUPTHER

## 2024-01-16 RX ORDER — SYRINGE,SAFETY WITH NEEDLE,1ML 25GX1"
SYRINGE (EA) MISCELLANEOUS
Qty: 60 EACH | Refills: 3 | Status: SHIPPED | OUTPATIENT
Start: 2024-01-16 | End: 2024-01-22 | Stop reason: SDUPTHER

## 2024-01-16 RX ORDER — INSULIN ASPART 100 [IU]/ML
INJECTION, SOLUTION INTRAVENOUS; SUBCUTANEOUS
Qty: 10 ML | Refills: 3 | Status: SHIPPED | OUTPATIENT
Start: 2024-01-16 | End: 2024-01-22 | Stop reason: SDUPTHER

## 2024-01-19 DIAGNOSIS — O09.90 AT HIGH RISK FOR COMPLICATIONS OF INTRAUTERINE PREGNANCY (IUP): ICD-10-CM

## 2024-01-19 DIAGNOSIS — O34.32 CERVICAL INSUFFICIENCY DURING PREGNANCY IN SECOND TRIMESTER, ANTEPARTUM: Primary | ICD-10-CM

## 2024-01-19 DIAGNOSIS — Z86.32 HISTORY OF INSULIN CONTROLLED GESTATIONAL DIABETES MELLITUS (GDM): ICD-10-CM

## 2024-01-19 DIAGNOSIS — O26.22 HABITUAL ABORTER, CURRENTLY PREGNANT IN SECOND TRIMESTER: ICD-10-CM

## 2024-01-19 DIAGNOSIS — O99.212 OBESITY AFFECTING PREGNANCY IN SECOND TRIMESTER, UNSPECIFIED OBESITY TYPE: ICD-10-CM

## 2024-01-22 ENCOUNTER — OFFICE VISIT (OUTPATIENT)
Dept: MATERNAL FETAL MEDICINE | Facility: CLINIC | Age: 39
End: 2024-01-22
Payer: COMMERCIAL

## 2024-01-22 ENCOUNTER — PROCEDURE VISIT (OUTPATIENT)
Dept: MATERNAL FETAL MEDICINE | Facility: CLINIC | Age: 39
End: 2024-01-22
Payer: COMMERCIAL

## 2024-01-22 VITALS
DIASTOLIC BLOOD PRESSURE: 76 MMHG | WEIGHT: 204.63 LBS | HEART RATE: 93 BPM | BODY MASS INDEX: 36.26 KG/M2 | HEIGHT: 63 IN | SYSTOLIC BLOOD PRESSURE: 120 MMHG

## 2024-01-22 DIAGNOSIS — O99.212 OBESITY AFFECTING PREGNANCY IN SECOND TRIMESTER, UNSPECIFIED OBESITY TYPE: ICD-10-CM

## 2024-01-22 DIAGNOSIS — Z86.32 HISTORY OF INSULIN CONTROLLED GESTATIONAL DIABETES MELLITUS (GDM): ICD-10-CM

## 2024-01-22 DIAGNOSIS — O09.292 HISTORY OF CERCLAGE, CURRENTLY PREGNANT, SECOND TRIMESTER: ICD-10-CM

## 2024-01-22 DIAGNOSIS — O09.90 AT HIGH RISK FOR COMPLICATIONS OF INTRAUTERINE PREGNANCY (IUP): Primary | ICD-10-CM

## 2024-01-22 DIAGNOSIS — O34.32 CERVICAL INSUFFICIENCY DURING PREGNANCY IN SECOND TRIMESTER, ANTEPARTUM: ICD-10-CM

## 2024-01-22 DIAGNOSIS — Z86.39 HISTORY OF HYPOTHYROIDISM: ICD-10-CM

## 2024-01-22 DIAGNOSIS — O09.90 AT HIGH RISK FOR COMPLICATIONS OF INTRAUTERINE PREGNANCY (IUP): ICD-10-CM

## 2024-01-22 DIAGNOSIS — O26.22 HABITUAL ABORTER, CURRENTLY PREGNANT IN SECOND TRIMESTER: ICD-10-CM

## 2024-01-22 DIAGNOSIS — O10.013 PRE-EXISTING ESSENTIAL HYPERTENSION AFFECTING PREGNANCY IN THIRD TRIMESTER: ICD-10-CM

## 2024-01-22 DIAGNOSIS — O99.213 OBESITY AFFECTING PREGNANCY IN THIRD TRIMESTER, UNSPECIFIED OBESITY TYPE: ICD-10-CM

## 2024-01-22 DIAGNOSIS — O09.293 HX OF PREECLAMPSIA, PRIOR PREGNANCY, CURRENTLY PREGNANT, THIRD TRIMESTER: ICD-10-CM

## 2024-01-22 DIAGNOSIS — O09.523 MULTIGRAVIDA OF ADVANCED MATERNAL AGE IN THIRD TRIMESTER: ICD-10-CM

## 2024-01-22 DIAGNOSIS — E74.89: ICD-10-CM

## 2024-01-22 DIAGNOSIS — O26.23 HABITUAL ABORTER, CURRENTLY PREGNANT IN THIRD TRIMESTER: ICD-10-CM

## 2024-01-22 DIAGNOSIS — Z98.890 HISTORY OF CERCLAGE, CURRENTLY PREGNANT, SECOND TRIMESTER: ICD-10-CM

## 2024-01-22 DIAGNOSIS — O34.33 CERVICAL INSUFFICIENCY DURING PREGNANCY IN THIRD TRIMESTER, ANTEPARTUM: ICD-10-CM

## 2024-01-22 DIAGNOSIS — O24.414 INSULIN CONTROLLED GESTATIONAL DIABETES MELLITUS (GDM) IN THIRD TRIMESTER: ICD-10-CM

## 2024-01-22 PROBLEM — O99.283 HYPOTHYROIDISM AFFECTING PREGNANCY IN THIRD TRIMESTER: Status: ACTIVE | Noted: 2023-09-18

## 2024-01-22 PROCEDURE — 76816 OB US FOLLOW-UP PER FETUS: CPT | Mod: S$GLB,,, | Performed by: OBSTETRICS & GYNECOLOGY

## 2024-01-22 PROCEDURE — 1159F MED LIST DOCD IN RCRD: CPT | Mod: CPTII,S$GLB,, | Performed by: OBSTETRICS & GYNECOLOGY

## 2024-01-22 PROCEDURE — 3074F SYST BP LT 130 MM HG: CPT | Mod: CPTII,S$GLB,, | Performed by: OBSTETRICS & GYNECOLOGY

## 2024-01-22 PROCEDURE — 3078F DIAST BP <80 MM HG: CPT | Mod: CPTII,S$GLB,, | Performed by: OBSTETRICS & GYNECOLOGY

## 2024-01-22 PROCEDURE — 3008F BODY MASS INDEX DOCD: CPT | Mod: CPTII,S$GLB,, | Performed by: OBSTETRICS & GYNECOLOGY

## 2024-01-22 PROCEDURE — 99214 OFFICE O/P EST MOD 30 MIN: CPT | Mod: S$GLB,,, | Performed by: OBSTETRICS & GYNECOLOGY

## 2024-01-22 RX ORDER — HUMAN INSULIN 100 [IU]/ML
INJECTION, SUSPENSION SUBCUTANEOUS
Qty: 10 ML | Refills: 3 | Status: SHIPPED | OUTPATIENT
Start: 2024-01-22 | End: 2024-02-26

## 2024-01-22 RX ORDER — LANCETS 30 GAUGE
EACH MISCELLANEOUS
Status: ON HOLD | COMMUNITY
Start: 2023-12-18 | End: 2024-03-14 | Stop reason: HOSPADM

## 2024-01-22 RX ORDER — SYRINGE,SAFETY WITH NEEDLE,1ML 25GX1"
SYRINGE (EA) MISCELLANEOUS
Qty: 60 EACH | Refills: 3 | Status: SHIPPED | OUTPATIENT
Start: 2024-01-22 | End: 2024-02-26

## 2024-01-22 RX ORDER — INSULIN ASPART 100 [IU]/ML
INJECTION, SOLUTION INTRAVENOUS; SUBCUTANEOUS
Qty: 10 ML | Refills: 3 | Status: ON HOLD | OUTPATIENT
Start: 2024-01-22 | End: 2024-03-05 | Stop reason: HOSPADM

## 2024-01-22 NOTE — PROGRESS NOTES
Maternal Fetal Medicine Follow Up    Subjective     Patient ID: [id number]    Chief Complaint: MFM FOLLOWUP W/US      HPI: Tawnya Love is a 38 y.o. female  at 30w2d gestation with Estimated Date of Delivery: 3/30/24  who is here for follow  up consultation by M.    She has history of chronic hypertension.  She is currently not on antihypertensives. On 2023, 24 hour urine with <6.8 mg protein.  She has history of 3 pregnancy losses and had negative APS testing.   In 2014 pregnancy, she experienced cramping for a few hours and then delivered at 14 weeks gestation. She had ultrasound indicated cerclage in her last pregnancy and was on nightly vaginal progesterone.   She delivered at 36 weeks in that pregnancy due to preeclampsia.  She is status post prophylactic cerclage on 2023.  She has increased BMI of 34 at consult visit. . She has history of gestational diabetes treated with metformin and insulin and also has history of PCOS.  On 2023, she had 3 hour GTT with 1 elevated value (1 hour 198).  She was advised to start checking her sugars.  Patient brought her sugar log with her and the fasting blood sugars are all elevated in the 90s and 100s and up to 111 and postprandial blood sugars are not consistently elevated except about have the times elevated in the afternoon and after supper, and sporadically elevated after breakfast, related to diet.  She has history of hypothyroidism and was previously on Synthroid in her last pregnancy.  She is not currently medication.  On 2023, TSH was normal at 1.256.  Follow-up TSH on 2024 was 0.908.  She is of advanced maternal age and will be 38 by the [facility].  She had negative cell free DNA. She had subchorionic hemorrhage consult ultrasound, not seen on follow-up ultrasound, and denied any vaginal bleeding.  She is is on low-dose aspirin twice daily.       Interval history since last M visit: None.. She denies any leaking fluid, vaginal  bleeding, contractions, decreased fetal movement. Denies headaches, visual disturbances, or epigastric pain.    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    Medications:  Current Outpatient Medications   Medication Instructions    aspirin 81 mg Cap     aspirin 81 mg, Oral, 2 times daily    blood sugar diagnostic (ACCU-CHEK GUIDE TEST STRIPS) Strp Use one strip four times daily to check blood sugars.    blood sugar diagnostic Strp 1 each, Misc.(Non-Drug; Combo Route), 4 times daily    blood-glucose meter kit Use as instructed to check blood glucose    insulin aspart U-100 (NOVOLOG) 100 unit/mL injection INJECT 6 UNITS UNDER THE SKIN AT SUPPER    insulin lispro (HUMALOG U-100 INSULIN) 100 unit/mL injection Inject 6 units under the skin at supper.    insulin NPH (NOVOLIN N NPH U-100 INSULIN) 100 unit/mL injection INJECT 10 UNITS AT BEDTIME    insulin syringe-needle U-100 1 mL 31 gauge x 5/16 Syrg USE 1 SYRINGE TO SKIN TWICE DAILY    lancets 30 gauge Misc 1 lancet , Misc.(Non-Drug; Combo Route), 4 times daily     "ONETOUCH ULTRA2 METER Misc SMARTSIG:Via Meter As Directed    prenatal vit/iron fum/folic ac (PRENATAL 1+1 ORAL) Oral       Review of Systems   12 point review of systems conducted, negative except as stated in the history of present illness. See HPI for details.      Objective     Visit Vitals  /76 (BP Location: Left arm, Patient Position: Sitting, BP Method: Large (Automatic))   Pulse 93   Ht 5' 3" (1.6 m)   Wt 92.8 kg (204 lb 9.6 oz)   BMI 36.24 kg/m²        Physical Exam  Vitals and nursing note reviewed.   Constitutional:       Appearance: Normal appearance.      Comments: Increased BMI   HENT:      Head: Normocephalic and atraumatic.      Nose: Nose normal. No congestion.   Cardiovascular:      Rate and Rhythm: Normal rate.   Pulmonary:      Effort: Pulmonary effort is normal.   Skin:     Findings: No rash.   Neurological:      Mental Status: She is alert and oriented to person, place, and time.   Psychiatric:         Mood and Affect: Mood normal.         Behavior: Behavior normal.         Thought Content: Thought content normal.         Judgment: Judgment normal.           ASSESSMENT/PLAN:     38 y.o.  female with IUP at 30w2d    Chronic hypertension in pregnancy  There is normal fetal growth with an EFW of 1728 g at the 50% and the AC at the 84% on 2024.  AFV is normal.     Chronic hypertension complicates up to 2-5% of pregnancies and is associated with significant adverse pregnancy outcomes including  birth, fetal growth restriction, fetal demise, placental abruption, and  delivery.    BP Readings from Last 1 Encounters:   24 120/76      With this BP, she was advised to continue low salt diet, and hold off on antihypertensive medication at this time.     Low dose aspirin as discussed.    We will plan to do follow-up ultrasounds to monitor growth around 24-26 weeks and then every 4 weeks.  Recommend fetal testing starting around 32 weeks gestation until the end of " pregnancy.     Among patients with uncomplicated chronic hypertension with no additional risk factors, delivery at 38-39 weeks appears to provide optimal balance between the risk of adverse fetal and  outcomes. If no medication and normal fetal assessment, recommend delivery at 39 weeks. However, will reassess closer to EDC to determine optimal timeframe or GA for delivery, based on current evaluation at that time.       History of cervical insufficiency and cerclage, s/p prophylactic cerclage  TVUS on 10/16/2023 with normal closed cervix 3.4 cm with cerclage suture in place. There is no need for further vaginal ultrasounds at this time. PTL precautions given.    She is aware of risks associated with cerclage placement and that goal is to decrease risk of  labor and delivery. She is to continue limited activity/home rest with ambulation/exercising BLE for DVT prophylaxis, no tub baths, and pelvic rest. She was instructed to report immediately any abdominal cramping, vaginal bleeding or change in discharge.     Will plan for cerclage removal at 36-37 weeks, unless indicated earlier.  PTL precautions reviewed.        Advanced maternal age  Reviewed risks with AMA, including risks for PTL, FGR, anomalies not seen, aneuploidy and stillbirth at term. She previously declined amniocentesis . She is aware of need for  evaluation. She previously had cell free DNA done with primary OB, requested.    She was advised to do fetal kick counts after 24 weeks and continue till delivery in view of the higher risk of fetal demise in utero closer to term with advanced maternal age.     Fetal surveillance as above.      History of recurrent miscarriages  She previously had negative APS testing.  Expectant management.  PTL precautions given.  Fetal kick count instructions reviewed.        Increased BMI with history of gestational diabetes  Body mass index is 36.24 kg/m². With reasonable weight gain since last  visit, she was advised to continue healthy and low caloric diet.  Excess weight gain would be associated with gestational hypertension, gestational diabetes and adverse  outcomes, including fetal demise in utero.    With risk factors associated with increased BMI,  will start fetal kick counts at 24 weeks and continue till delivery.    It is important to lose weight after the pregnancy is over, especially before a future pregnancy was discussed. Breastfeeding may be an important tool in reducing the postpartum weight retention. Fetal risks were discussed with short term risk of fetal/ obesity and long term risk of adolescent component of metabolic syndrome.    With higher risks for gestational diabetes with BMI greater than 30, she had normal early 1hr GCT. Recommend repeat 1hr GCT around 26-28 weeks gestation.       History of hypothyroidism  Risks associated with uncontrolled hypothyroidism, including potential for pregnancy loss, neurologic/cognitive deficit in the baby,  delivery, LBW,  delivery, and gestational hypertension/preeclampsia were previously discussed.    With normal TSH, advised patient to stay off of Synthroid at this time.  She was instructed to report any symptoms of cold/heat intolerance, heart palpitations, fatigue or constipation.       At high risk for preeclampsia with a history of severe preeclampsia requiring delivery at 36 weeks  With her increased risk for preeclampsia, she agreed to start aspirin today and continue asa 81 mg BID until 34 6/7 weeks, then decrease to once daily until delivery. Preeclampsia precautions reviewed.      Recurrent gestational diabetes mellitus  The incidence of diabetes in pregnancy is 6-7%, and about 85% represent GDM. I discussed with her risks associated with diabetes in pregnancy including higher risk for polyhydramnios, fetal macrosomia, lower Apgar scores and  metabolic complications (hypoglycemia,  hyperbilirubinemia, hypocalcemia, erythema) and developing preeclampsia. With suboptimal diet control, treatment is recommended in addition to 2200 calorie diabetic diet. It is recommended that she have 30 grams of carbohydrates with breakfast, and 60 grams with lunch and dinner. In addition, she should have three snacks in between meals with 15 grams of carbohydrates and at bedtime with 30 grams of carbohydrates. The importance of avoiding any significant weight gain till the end of the pregnancy was discussed.      I have shared with her the options of treatment including insulin treatment which is the gold standard of care for diabetes in pregnancy versus a recent use of glyburide or metformin as alternatives. Although, glyburide has been used over the past 10 years as primary alternative to insulin, a recent meta-analysis (2015) suggested that metformin should be the alternative to insulin and not glyburide. The conclusion of the study showed a higher birth weight (100 g) associated with glyburide use compared to insulin, two fold higher risk of  hypoglycemia, and more than 2x higher risk of macrosomia associated with glyburide use. This difference is likely to be secondary to hyperinsulinism in fetus secondary to fetal exposure to glyburide.  Metformin, also had lower maternal weight gain, lower birth rate and less risk of macrosomia when compared with glyburide. When compared to insulin, Metformin had lower maternal weight gain, increased  birth and lower gestational age at delivery and lower incidence of gestational hypertension. There was a trend for less  hypoglycemia and high failure rate of 30-35%, when compared to insulin.  In addition, the lack of long term data on glyburide and metformin use regarding safety was addressed and recommended use of Insulin for treatment, which is the gold standard, with excellent efficiency and safety, albeit more inconvenience.  Questions were  answered.                  Log reviewed. After counseling on Insulin use and alternatives of metformin and glyburide with their benefits and risks, agreed to start 12 units of NPH in the morning, 6 units of Humalog for supper, and 14 units of NPH at bedtime along with a rescue dose of Humalog for 1 unit for every 8 mg of sugar over 140. . With insulin use/metformin, there is risk for hypoglycemia. I discussed symptoms of hypoglycemia with recommendation to assess blood sugar then eat something high in sugar. I informed her that the best way to decrease episodes of low blood sugar is well balanced healthy diabetic diet with appropriate snacks between meals. Brochures given. Questions answered.     The patient was instructed to check blood sugar four times per day and call me in a few days with her blood sugars to make further adjustments of dose of Insulin. The goal of treatment is to keep pre-prandial blood sugars below 90 and one hour postprandial below 140.     Fetal testing could be started in this setting around 32 weeks gestation and increase frequency later on in the pregnancy. If additional complications occur, then closer fetal surveillance will be needed. She needs to do fetal kick counts throughout the pregnancy starting at 24 weeks.     With good BS control and stable gestational diabetes, on insulin or metformin, recommend delivery at 39 weeks gestation.     The association of gestational diabetes (50%) with adult-onset type 2 diabetes mellitus was previously discussed.  The importance of losing weight after the pregnancy was emphasized, as well as doing a 75 g sugar test after postpartum exam and to have annual checkups every 1-3 years for blood sugars to make sure she does not develop diabetes.      Follow up in about 2 weeks (around 2/5/2024) for [facility] follow-up, [facility].     Future Appointments   Date Time Provider Department Center   12/2/2024  2:20 PM Shawna Stewart MD [place] [id number]         AARON involvement: Patient was evaluated and examined by Dr. Alfonso. MARIBEL Shah, helped as  scribe in completion of part of note.    This note was created with the assistance of [employer] voice recognition software. There may be transcription errors as a result of using this technology, however minimal. Effort has been made to ensure accuracy of transcription, but any obvious errors or omissions should be clarified with the author of the document.

## 2024-02-02 DIAGNOSIS — Z86.39 HISTORY OF HYPOTHYROIDISM: ICD-10-CM

## 2024-02-02 DIAGNOSIS — Z86.32 HISTORY OF INSULIN CONTROLLED GESTATIONAL DIABETES MELLITUS (GDM): ICD-10-CM

## 2024-02-02 DIAGNOSIS — O09.90 AT HIGH RISK FOR COMPLICATIONS OF INTRAUTERINE PREGNANCY (IUP): Primary | ICD-10-CM

## 2024-02-02 DIAGNOSIS — O10.013 PRE-EXISTING ESSENTIAL HYPERTENSION AFFECTING PREGNANCY IN THIRD TRIMESTER: ICD-10-CM

## 2024-02-05 ENCOUNTER — PROCEDURE VISIT (OUTPATIENT)
Dept: MATERNAL FETAL MEDICINE | Facility: CLINIC | Age: 39
End: 2024-02-05
Payer: COMMERCIAL

## 2024-02-05 ENCOUNTER — OFFICE VISIT (OUTPATIENT)
Dept: MATERNAL FETAL MEDICINE | Facility: CLINIC | Age: 39
End: 2024-02-05
Payer: COMMERCIAL

## 2024-02-05 VITALS
HEIGHT: 63 IN | BODY MASS INDEX: 36.32 KG/M2 | SYSTOLIC BLOOD PRESSURE: 117 MMHG | DIASTOLIC BLOOD PRESSURE: 81 MMHG | WEIGHT: 205 LBS | HEART RATE: 83 BPM

## 2024-02-05 DIAGNOSIS — O26.23 HABITUAL ABORTER, CURRENTLY PREGNANT IN THIRD TRIMESTER: ICD-10-CM

## 2024-02-05 DIAGNOSIS — Z98.890 HISTORY OF CERCLAGE, CURRENTLY PREGNANT, SECOND TRIMESTER: ICD-10-CM

## 2024-02-05 DIAGNOSIS — Z86.39 HISTORY OF HYPOTHYROIDISM: ICD-10-CM

## 2024-02-05 DIAGNOSIS — O09.523 MULTIGRAVIDA OF ADVANCED MATERNAL AGE IN THIRD TRIMESTER: ICD-10-CM

## 2024-02-05 DIAGNOSIS — O09.293 HX OF PREECLAMPSIA, PRIOR PREGNANCY, CURRENTLY PREGNANT, THIRD TRIMESTER: ICD-10-CM

## 2024-02-05 DIAGNOSIS — O10.013 PRE-EXISTING ESSENTIAL HYPERTENSION AFFECTING PREGNANCY IN THIRD TRIMESTER: ICD-10-CM

## 2024-02-05 DIAGNOSIS — O34.33 CERVICAL INSUFFICIENCY DURING PREGNANCY IN THIRD TRIMESTER, ANTEPARTUM: ICD-10-CM

## 2024-02-05 DIAGNOSIS — O09.90 AT HIGH RISK FOR COMPLICATIONS OF INTRAUTERINE PREGNANCY (IUP): Primary | ICD-10-CM

## 2024-02-05 DIAGNOSIS — Z86.32 HISTORY OF INSULIN CONTROLLED GESTATIONAL DIABETES MELLITUS (GDM): ICD-10-CM

## 2024-02-05 DIAGNOSIS — O09.90 AT HIGH RISK FOR COMPLICATIONS OF INTRAUTERINE PREGNANCY (IUP): ICD-10-CM

## 2024-02-05 DIAGNOSIS — O24.414 INSULIN CONTROLLED GESTATIONAL DIABETES MELLITUS (GDM) IN THIRD TRIMESTER: ICD-10-CM

## 2024-02-05 DIAGNOSIS — O99.213 OBESITY AFFECTING PREGNANCY IN THIRD TRIMESTER, UNSPECIFIED OBESITY TYPE: ICD-10-CM

## 2024-02-05 DIAGNOSIS — O09.292 HISTORY OF CERCLAGE, CURRENTLY PREGNANT, SECOND TRIMESTER: ICD-10-CM

## 2024-02-05 PROCEDURE — 3074F SYST BP LT 130 MM HG: CPT | Mod: CPTII,S$GLB,, | Performed by: OBSTETRICS & GYNECOLOGY

## 2024-02-05 PROCEDURE — 76819 FETAL BIOPHYS PROFIL W/O NST: CPT | Mod: S$GLB,,, | Performed by: OBSTETRICS & GYNECOLOGY

## 2024-02-05 PROCEDURE — 99214 OFFICE O/P EST MOD 30 MIN: CPT | Mod: 25,S$GLB,, | Performed by: OBSTETRICS & GYNECOLOGY

## 2024-02-05 PROCEDURE — 3079F DIAST BP 80-89 MM HG: CPT | Mod: CPTII,S$GLB,, | Performed by: OBSTETRICS & GYNECOLOGY

## 2024-02-05 PROCEDURE — 1159F MED LIST DOCD IN RCRD: CPT | Mod: CPTII,S$GLB,, | Performed by: OBSTETRICS & GYNECOLOGY

## 2024-02-05 PROCEDURE — 3008F BODY MASS INDEX DOCD: CPT | Mod: CPTII,S$GLB,, | Performed by: OBSTETRICS & GYNECOLOGY

## 2024-02-05 NOTE — PROGRESS NOTES
Maternal Fetal Medicine Follow Up    Subjective     Patient ID: [id number]    Chief Complaint: MFM follow up with US (CHTN, GDM, AMA, h/o miscarriages x 3, h/o cerclage, h/o Preeclampsia, Hypothyroidism.  )      HPI: Tawnya Love is a 38 y.o. female  at 32w2d gestation with Estimated Date of Delivery: 3/30/24  who is here for follow  up consultation by MFM.    She has history of chronic hypertension.  She is currently not on antihypertensives. On 2023, 24 hour urine with <6.8 mg protein.  She has history of 3 pregnancy losses and had negative APS testing.   In 2014 pregnancy, she experienced cramping for a few hours and then delivered at 14 weeks gestation. She had ultrasound indicated cerclage in her last pregnancy and was on nightly vaginal progesterone.   She delivered at 36 weeks in that pregnancy due to preeclampsia.  She is status post prophylactic cerclage on 2023.  She has increased BMI of 34 at consult visit. She has history of gestational diabetes treated with metformin and insulin and also has history of PCOS.  She now has recurrent GDM and is on insulin, 12 units of NPH in the morning, 6 units of Humalog for supper, and 14 units of NPH at bedtime. She has corrective formula of 1 unit of Humalog for every 8 mg over 140.   She has history of hypothyroidism and was previously on Synthroid in her last pregnancy.  She is not currently medication.  On 2023, TSH was normal at 1.256.  Follow-up TSH on 2024 was 0.908.  She is of advanced maternal age and will be 38 by the [facility].  She had negative cell free DNA. She had subchorionic hemorrhage consult ultrasound, not seen on follow-up ultrasound, and denied any vaginal bleeding.  She is is on low-dose aspirin twice daily.       Interval history since last MFM visit: None.. She denies any leaking fluid, vaginal bleeding, contractions, decreased fetal movement. Denies headaches, visual disturbances, or epigastric pain.    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    Medications:  Current Outpatient Medications   Medication Instructions    aspirin 81 mg Cap     aspirin 81 mg, Oral, 2 times daily    blood sugar diagnostic (ACCU-CHEK GUIDE TEST STRIPS) Strp Use one strip four times daily to check blood sugars.    blood sugar diagnostic Strp 1 each, Misc.(Non-Drug; Combo Route), 4 times daily    blood-glucose meter kit Use as instructed to check blood glucose    insulin aspart U-100 (NOVOLOG) 100 unit/mL injection Inject 6 units at supper. Also, use the corrective dosage of 1 unit for every 8 mg over 140. Max dose 40 units per day.    insulin NPH (NOVOLIN N NPH U-100 INSULIN) 100 unit/mL injection INJECT 12 units in the morning, and 14 units at bedtime.    insulin syringe-needle U-100 1 mL 31 gauge x 5/16 Syrg Use 1 syringe to skin three x's daily.    lancets 30 gauge Misc 1 lancet , Misc.(Non-Drug; Combo Route), 4 times daily    ONETOUCH ULTRA2 METER Misc SMARTSIG:Via Meter As Directed    prenatal vit/iron fum/folic ac (PRENATAL 1+1 ORAL) Oral  "      Review of Systems   12 point review of systems conducted, negative except as stated in the history of present illness. See HPI for details.      Objective     Visit Vitals  /81 (BP Location: Right arm, Patient Position: Sitting, BP Method: Large (Automatic))   Pulse 83   Ht 5' 3" (1.6 m)   Wt 93 kg (205 lb)   BMI 36.31 kg/m²          Physical Exam  Vitals and nursing note reviewed.   Constitutional:       Appearance: Normal appearance.      Comments: Increased BMI   HENT:      Head: Normocephalic and atraumatic.      Nose: Nose normal. No congestion.   Cardiovascular:      Rate and Rhythm: Normal rate.   Pulmonary:      Effort: Pulmonary effort is normal.   Skin:     Findings: No rash.   Neurological:      Mental Status: She is alert and oriented to person, place, and time.   Psychiatric:         Mood and Affect: Mood normal.         Behavior: Behavior normal.         Thought Content: Thought content normal.         Judgment: Judgment normal.           ASSESSMENT/PLAN:     38 y.o.  female with IUP at 32w2d    Chronic hypertension in pregnancy  There is normal fetal growth with an EFW of 1728 g at the 50% and the AC at the 84% on 2024.  AFV is normal.     Chronic hypertension complicates up to 2-5% of pregnancies and is associated with significant adverse pregnancy outcomes including  birth, fetal growth restriction, fetal demise, placental abruption, and  delivery.    BP Readings from Last 1 Encounters:   24 117/81      With this BP, and an additional reading of 134/100, she was advised to continue low salt diet, and hold off on antihypertensive medication at this time.     Low dose aspirin as discussed.    With blood sugars not perfectly controlled and some fluctuations and chronic hypertension with the advanced maternal age we will do twice weekly fetal testing alternating with Dr. Emanuel.    Among patients with uncomplicated chronic hypertension with no additional " risk factors, delivery at 38-39 weeks appears to provide optimal balance between the risk of adverse fetal and  outcomes. If no medication and normal fetal assessment, recommend delivery at 39 weeks. However, will reassess closer to EDC to determine optimal timeframe or GA for delivery, based on current evaluation at that time.       History of cervical insufficiency and cerclage, s/p prophylactic cerclage  TVUS on 10/16/2023 with normal closed cervix 3.4 cm with cerclage suture in place. There is no need for further vaginal ultrasounds at this time. PTL precautions given.    She is aware of risks associated with cerclage placement and that goal is to decrease risk of  labor and delivery. She is to continue limited activity/home rest with ambulation/exercising BLE for DVT prophylaxis, no tub baths, and pelvic rest. She was instructed to report immediately any abdominal cramping, vaginal bleeding or change in discharge.     Will plan for cerclage removal at 36-37 weeks, unless indicated earlier.  PTL precautions reviewed.        Advanced maternal age  Reviewed risks with AMA, including risks for PTL, FGR, anomalies not seen, aneuploidy and stillbirth at term. She previously declined amniocentesis . She is aware of need for  evaluation. She previously had cell free DNA done with primary OB, requested.    She was advised to do fetal kick counts after 24 weeks and continue till delivery in view of the higher risk of fetal demise in utero closer to term with advanced maternal age.     Fetal surveillance as above.      History of recurrent miscarriages  She previously had negative APS testing.  Expectant management.  PTL precautions given.  Fetal kick count instructions reviewed.        Increased BMI in pregnancy  Body mass index is 36.31 kg/m². With stable weight.  She will continue healthy and low caloric diet.  Excess weight gain would be associated with gestational hypertension, gestational  diabetes and adverse  outcomes, including fetal demise in utero.    With risk factors associated with increased BMI,  will start fetal kick counts at 24 weeks and continue till delivery.    It is important to lose weight after the pregnancy is over, especially before a future pregnancy was discussed. Breastfeeding may be an important tool in reducing the postpartum weight retention. Fetal risks were discussed with short term risk of fetal/ obesity and long term risk of adolescent component of metabolic syndrome.    With higher risks for gestational diabetes with BMI greater than 30, she had normal early 1hr GCT. Recommend repeat 1hr GCT around 26-28 weeks gestation.       History of hypothyroidism  Risks associated with uncontrolled hypothyroidism, including potential for pregnancy loss, neurologic/cognitive deficit in the baby,  delivery, LBW,  delivery, and gestational hypertension/preeclampsia were previously discussed.    With normal TSH, advised patient to stay off of Synthroid at this time.  She was instructed to report any symptoms of cold/heat intolerance, heart palpitations, fatigue or constipation.       At high risk for preeclampsia with a history of severe preeclampsia requiring delivery at 36 weeks  With her increased risk for preeclampsia, she agreed to start aspirin today and continue asa 81 mg BID until 34 6/7 weeks, then decrease to once daily until delivery. Preeclampsia precautions reviewed.      Recurrent gestational diabetes mellitus  Risks associated with diabetes in pregnancy include higher risk for polyhydramnios, fetal macrosomia, lower Apgar scores and  metabolic complications (hypoglycemia, hyperbilirubinemia, hypocalcemia, erythema) and developing preeclampsia.     Continue 2200 calorie diabetic diet. It is recommended that she have 30 grams of carbohydrates with breakfast, and 60 grams with lunch and dinner. In addition, she should have three snacks  in between meals with 15 grams of carbohydrates and at bedtime with 30 grams of carbohydrates. The importance of avoiding any significant weight gain till the end of the pregnancy was reviewed.                  Log reviewed. Patient advised to adjust to 12 units of NPH and 6 units of Humalog in the morning, 6 units of Humalog for supper, and 18 units of NPH at bedtime. Continue rescue dose of Humalog for 1 unit for every 7 mg of sugar over 140.     The patient was instructed to check blood sugar four times per day and call me in a few days with her blood sugars to make further adjustments of dose of Insulin. The goal of treatment is to keep pre-prandial blood sugars below 90 and one hour postprandial below 140.     See fetal testing as above.  She was advised to continue fetal kick counts 3 times daily, with immediate reporting of decreased fetal movements (<10 movements/hr).     With good BS control and stable gestational diabetes, on insulin or metformin, recommend delivery at 39 weeks gestation.     The association of gestational diabetes (50%) with adult-onset type 2 diabetes mellitus was reviewed.  The importance of losing weight after the pregnancy was emphasized, as well as doing a 75 g sugar test after postpartum exam and to have annual checkups every 1-3 years for blood sugars to make sure she does not develop diabetes.      Follow up in about 1 week (around 2/12/2024) for BPP, MFM follow-up.     Future Appointments   Date Time Provider Department Center   12/2/2024  2:20 PM Shawna Stewart MD [place] [id number]        AARON involvement: Patient was evaluated and examined by Dr. Alfonso. MARIBEL Shah, helped as  scribe in completion of part of note.    This note was created with the assistance of [employer] voice recognition software. There may be transcription errors as a result of using this technology, however minimal. Effort has been made to ensure accuracy of transcription, but any obvious  errors or omissions should be clarified with the author of the document.

## 2024-02-09 DIAGNOSIS — O24.414 INSULIN CONTROLLED GESTATIONAL DIABETES MELLITUS (GDM) IN THIRD TRIMESTER: ICD-10-CM

## 2024-02-09 DIAGNOSIS — Z86.32 HISTORY OF INSULIN CONTROLLED GESTATIONAL DIABETES MELLITUS (GDM): ICD-10-CM

## 2024-02-09 DIAGNOSIS — O09.90 AT HIGH RISK FOR COMPLICATIONS OF INTRAUTERINE PREGNANCY (IUP): Primary | ICD-10-CM

## 2024-02-09 DIAGNOSIS — O10.013 PRE-EXISTING ESSENTIAL HYPERTENSION AFFECTING PREGNANCY IN THIRD TRIMESTER: ICD-10-CM

## 2024-02-12 ENCOUNTER — PROCEDURE VISIT (OUTPATIENT)
Dept: MATERNAL FETAL MEDICINE | Facility: CLINIC | Age: 39
End: 2024-02-12
Payer: COMMERCIAL

## 2024-02-12 ENCOUNTER — OFFICE VISIT (OUTPATIENT)
Dept: MATERNAL FETAL MEDICINE | Facility: CLINIC | Age: 39
End: 2024-02-12
Payer: COMMERCIAL

## 2024-02-12 VITALS
DIASTOLIC BLOOD PRESSURE: 85 MMHG | HEART RATE: 95 BPM | BODY MASS INDEX: 36.68 KG/M2 | SYSTOLIC BLOOD PRESSURE: 127 MMHG | WEIGHT: 207 LBS | HEIGHT: 63 IN

## 2024-02-12 DIAGNOSIS — Z86.32 HISTORY OF INSULIN CONTROLLED GESTATIONAL DIABETES MELLITUS (GDM): ICD-10-CM

## 2024-02-12 DIAGNOSIS — O10.013 PRE-EXISTING ESSENTIAL HYPERTENSION AFFECTING PREGNANCY IN THIRD TRIMESTER: ICD-10-CM

## 2024-02-12 DIAGNOSIS — O09.292 HISTORY OF CERCLAGE, CURRENTLY PREGNANT, SECOND TRIMESTER: ICD-10-CM

## 2024-02-12 DIAGNOSIS — O24.414 INSULIN CONTROLLED GESTATIONAL DIABETES MELLITUS (GDM) IN THIRD TRIMESTER: ICD-10-CM

## 2024-02-12 DIAGNOSIS — O09.90 AT HIGH RISK FOR COMPLICATIONS OF INTRAUTERINE PREGNANCY (IUP): Primary | ICD-10-CM

## 2024-02-12 DIAGNOSIS — O09.293 HX OF PREECLAMPSIA, PRIOR PREGNANCY, CURRENTLY PREGNANT, THIRD TRIMESTER: ICD-10-CM

## 2024-02-12 DIAGNOSIS — O34.33 CERVICAL INSUFFICIENCY DURING PREGNANCY IN THIRD TRIMESTER, ANTEPARTUM: ICD-10-CM

## 2024-02-12 DIAGNOSIS — O26.23 HABITUAL ABORTER, CURRENTLY PREGNANT IN THIRD TRIMESTER: ICD-10-CM

## 2024-02-12 DIAGNOSIS — Z86.39 HISTORY OF HYPOTHYROIDISM: ICD-10-CM

## 2024-02-12 DIAGNOSIS — O09.523 MULTIGRAVIDA OF ADVANCED MATERNAL AGE IN THIRD TRIMESTER: ICD-10-CM

## 2024-02-12 DIAGNOSIS — Z98.890 HISTORY OF CERCLAGE, CURRENTLY PREGNANT, SECOND TRIMESTER: ICD-10-CM

## 2024-02-12 DIAGNOSIS — O09.90 AT HIGH RISK FOR COMPLICATIONS OF INTRAUTERINE PREGNANCY (IUP): ICD-10-CM

## 2024-02-12 DIAGNOSIS — O99.213 OBESITY AFFECTING PREGNANCY IN THIRD TRIMESTER, UNSPECIFIED OBESITY TYPE: ICD-10-CM

## 2024-02-12 PROCEDURE — 3079F DIAST BP 80-89 MM HG: CPT | Mod: CPTII,S$GLB,, | Performed by: OBSTETRICS & GYNECOLOGY

## 2024-02-12 PROCEDURE — 3008F BODY MASS INDEX DOCD: CPT | Mod: CPTII,S$GLB,, | Performed by: OBSTETRICS & GYNECOLOGY

## 2024-02-12 PROCEDURE — 99214 OFFICE O/P EST MOD 30 MIN: CPT | Mod: S$GLB,,, | Performed by: OBSTETRICS & GYNECOLOGY

## 2024-02-12 PROCEDURE — 1160F RVW MEDS BY RX/DR IN RCRD: CPT | Mod: CPTII,S$GLB,, | Performed by: OBSTETRICS & GYNECOLOGY

## 2024-02-12 PROCEDURE — 1159F MED LIST DOCD IN RCRD: CPT | Mod: CPTII,S$GLB,, | Performed by: OBSTETRICS & GYNECOLOGY

## 2024-02-12 PROCEDURE — 3074F SYST BP LT 130 MM HG: CPT | Mod: CPTII,S$GLB,, | Performed by: OBSTETRICS & GYNECOLOGY

## 2024-02-12 PROCEDURE — 76819 FETAL BIOPHYS PROFIL W/O NST: CPT | Mod: S$GLB,,, | Performed by: OBSTETRICS & GYNECOLOGY

## 2024-02-12 NOTE — PROGRESS NOTES
Maternal Fetal Medicine Follow Up    Subjective     Patient ID: [id number]    Chief Complaint: MFM follow up w/us  (Pt c/o pelvic pain )      HPI: Tawnya Love is a 38 y.o. female  at 33w2d gestation with Estimated Date of Delivery: 3/30/24  who is here for follow  up consultation by MFM.    She has history of chronic hypertension.  She is currently not on antihypertensives. On 2023, 24 hour urine with <6.8 mg protein.  She has history of 3 pregnancy losses and had negative APS testing.   In 2014 pregnancy, she experienced cramping for a few hours and then delivered at 14 weeks gestation. She had ultrasound indicated cerclage in her last pregnancy and was on nightly vaginal progesterone.   She delivered at 36 weeks in that pregnancy due to preeclampsia.  She is status post prophylactic cerclage on 2023.  She has increased BMI of 34 at consult visit. She has history of gestational diabetes treated with metformin and insulin and also has history of PCOS.  She now has recurrent GDM and is on insulin, 12 units of NPH and 6 units of Humalog in the morning, 6 units of Humalog for supper, and 18 units of NPH at bedtime. She has corrective formula of 1 unit of Humalog for every 8 mg over 140.   She has history of hypothyroidism and was previously on Synthroid in her last pregnancy.  She is not currently medication.  On 2023, TSH was normal at 1.256.  Follow-up TSH on 2024 was 0.908.  She is of advanced maternal age and will be 38 by the [facility].  She had negative cell free DNA. She had subchorionic hemorrhage consult ultrasound, not seen on follow-up ultrasound, and denied any vaginal bleeding.  She is is on low-dose aspirin twice daily.       Interval history since last M visit: None.. She denies any leaking fluid, vaginal bleeding, contractions, decreased fetal movement. Denies headaches, visual disturbances, or epigastric pain.    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    Medications:  Current Outpatient Medications   Medication Instructions    aspirin 81 mg Cap     aspirin 81 mg, Oral, 2 times daily    blood sugar diagnostic (ACCU-CHEK GUIDE TEST STRIPS) Strp Use one strip four times daily to check blood sugars.    blood sugar diagnostic Strp 1 each, Misc.(Non-Drug; Combo Route), 4 times daily    blood-glucose meter kit Use as instructed to check blood glucose    insulin aspart U-100 (NOVOLOG) 100 unit/mL injection Inject 6 units at supper. Also, use the corrective dosage of 1 unit for every 8 mg over 140. Max dose 40 units per day.    insulin NPH (NOVOLIN N NPH U-100 INSULIN) 100 unit/mL injection INJECT 12 units in the morning, and 14 units at bedtime.    insulin syringe-needle U-100 1 mL 31 gauge x 5/16 Syrg Use 1 syringe to skin three x's daily.    lancets 30 gauge Misc 1 lancet , Misc.(Non-Drug; Combo Route), 4 times daily    ONETOUCH ULTRA2 METER Misc SMARTSIG:Via Meter As Directed    prenatal vit/iron fum/folic ac (PRENATAL 1+1 ORAL) Oral       Review of Systems   12 point review of  "systems conducted, negative except as stated in the history of present illness. See HPI for details.      Objective     Visit Vitals  /85 (BP Location: Left arm, Patient Position: Sitting, BP Method: Medium (Automatic))   Pulse 95   Ht 5' 3" (1.6 m)   Wt 93.9 kg (207 lb)   BMI 36.67 kg/m²            Physical Exam  Vitals and nursing note reviewed.   Constitutional:       Appearance: Normal appearance.      Comments: Increased BMI   HENT:      Head: Normocephalic and atraumatic.      Nose: Nose normal. No congestion.   Cardiovascular:      Rate and Rhythm: Normal rate.   Pulmonary:      Effort: Pulmonary effort is normal.   Skin:     Findings: No rash.   Neurological:      Mental Status: She is alert and oriented to person, place, and time.   Psychiatric:         Mood and Affect: Mood normal.         Behavior: Behavior normal.         Thought Content: Thought content normal.         Judgment: Judgment normal.           ASSESSMENT/PLAN:     38 y.o.  female with IUP at 33w2d    Chronic hypertension in pregnancy  There is normal fetal growth with an EFW of 1728 g at the 50% and the AC at the 84% on 2024.  AFV is normal.     Chronic hypertension complicates up to 2-5% of pregnancies and is associated with significant adverse pregnancy outcomes including  birth, fetal growth restriction, fetal demise, placental abruption, and  delivery.    BP Readings from Last 1 Encounters:   24 127/85      With this BP,  she was advised to continue low salt diet, and hold off on antihypertensive medication at this time.     Low dose aspirin as discussed.    We will do twice weekly fetal testing alternating with Dr. Emanuel.    Among patients with uncomplicated chronic hypertension with no additional risk factors, delivery at 38-39 weeks appears to provide optimal balance between the risk of adverse fetal and  outcomes. If no medication and normal fetal assessment, recommend delivery at 39 " weeks. However, will reassess closer to EDC to determine optimal timeframe or GA for delivery, based on current evaluation at that time.       History of cervical insufficiency and cerclage, s/p prophylactic cerclage  TVUS on 10/16/2023 with normal closed cervix 3.4 cm with cerclage suture in place. There is no need for further vaginal ultrasounds at this time. PTL precautions given.    She is aware of risks associated with cerclage placement and that goal is to decrease risk of  labor and delivery. She is to continue limited activity/home rest with ambulation/exercising BLE for DVT prophylaxis, no tub baths, and pelvic rest. She was instructed to report immediately any abdominal cramping, vaginal bleeding or change in discharge.     Will plan for cerclage removal at 36-37 weeks, unless indicated earlier.  PTL precautions reviewed.        Advanced maternal age  Reviewed risks with AMA, including risks for PTL, FGR, anomalies not seen, aneuploidy and stillbirth at term. She previously declined amniocentesis . She is aware of need for  evaluation. She previously had cell free DNA done with primary OB, requested.    She was advised to do fetal kick counts after 24 weeks and continue till delivery in view of the higher risk of fetal demise in utero closer to term with advanced maternal age.     Fetal surveillance as above.      History of recurrent miscarriages  She previously had negative APS testing.  Expectant management.  PTL precautions given.  Fetal kick count instructions reviewed.        Increased BMI in pregnancy  Body mass index is 36.67 kg/m². With reasonable weight gain.  She will continue healthy and low caloric diet.  Excess weight gain would be associated with gestational hypertension, gestational diabetes and adverse  outcomes, including fetal demise in utero.    With risk factors associated with increased BMI,  will start fetal kick counts at 24 weeks and continue till  delivery.    It is important to lose weight after the pregnancy is over, especially before a future pregnancy was discussed. Breastfeeding may be an important tool in reducing the postpartum weight retention. Fetal risks were discussed with short term risk of fetal/ obesity and long term risk of adolescent component of metabolic syndrome.    With higher risks for gestational diabetes with BMI greater than 30, she had normal early 1hr GCT. Recommend repeat 1hr GCT around 26-28 weeks gestation.       History of hypothyroidism  Risks associated with uncontrolled hypothyroidism, including potential for pregnancy loss, neurologic/cognitive deficit in the baby,  delivery, LBW,  delivery, and gestational hypertension/preeclampsia were previously discussed.    With normal TSH, advised patient to stay off of Synthroid at this time.  She was instructed to report any symptoms of cold/heat intolerance, heart palpitations, fatigue or constipation.       At high risk for preeclampsia with a history of severe preeclampsia requiring delivery at 36 weeks  With her increased risk for preeclampsia, she agreed to start aspirin today and continue asa 81 mg BID until 34 6/7 weeks, then decrease to once daily until delivery. Preeclampsia precautions reviewed.      Recurrent gestational diabetes mellitus  Risks associated with diabetes in pregnancy include higher risk for polyhydramnios, fetal macrosomia, lower Apgar scores and  metabolic complications (hypoglycemia, hyperbilirubinemia, hypocalcemia, erythema) and developing preeclampsia.     Continue 2200 calorie diabetic diet. It is recommended that she have 30 grams of carbohydrates with breakfast, and 60 grams with lunch and dinner. In addition, she should have three snacks in between meals with 15 grams of carbohydrates and at bedtime with 30 grams of carbohydrates. The importance of avoiding any significant weight gain till the end of the pregnancy was  reviewed.                  Log reviewed. Patient advised to adjust to 14 units of NPH and 8 units of Humalog in the morning, 6 units of Humalog for supper, and 22 units of NPH at bedtime. Continue rescue dose of Humalog for 1 unit for every 7 mg of sugar over 140.     The patient was instructed to check blood sugar four times per day and call me in a few days with her blood sugars to make further adjustments of dose of Insulin. The goal of treatment is to keep pre-prandial blood sugars below 90 and one hour postprandial below 140.     See fetal testing as above.  She was advised to continue fetal kick counts 3 times daily, with immediate reporting of decreased fetal movements (<10 movements/hr).     With good BS control and stable gestational diabetes, on insulin or metformin, recommend delivery at 39 weeks gestation.     The association of gestational diabetes (50%) with adult-onset type 2 diabetes mellitus was reviewed.  The importance of losing weight after the pregnancy was emphasized, as well as doing a 75 g sugar test after postpartum exam and to have annual checkups every 1-3 years for blood sugars to make sure she does not develop diabetes.      Follow up in about 1 week (around 2/19/2024) for MFM follow-up, Repeat ultrasound, BPP.       Blood pressure is stable continue low-salt diet and aspirin 81 mg b.i.d..  Blood sugars reviewed.  Fasting blood sugar is borderline elevated with some after breakfast and after lunch sugar elevations.  I made the adjustment on dose of insulin as above.  Continue fetal kick counts and twice weekly fetal testing.    Future Appointments   Date Time Provider Department Center   12/2/2024  2:20 PM Shawna Stewart MD [place] [id number]        AARON involvement: Patient was evaluated and examined by Dr. Alfonso. MARIBEL Shah, helped as  scribe in completion of part of note.    This note was created with the assistance of [employer] voice recognition software. There may be  transcription errors as a result of using this technology, however minimal. Effort has been made to ensure accuracy of transcription, but any obvious errors or omissions should be clarified with the author of the document.

## 2024-02-15 DIAGNOSIS — O10.013 PRE-EXISTING ESSENTIAL HYPERTENSION AFFECTING PREGNANCY IN THIRD TRIMESTER: Primary | ICD-10-CM

## 2024-02-19 ENCOUNTER — PROCEDURE VISIT (OUTPATIENT)
Dept: MATERNAL FETAL MEDICINE | Facility: CLINIC | Age: 39
End: 2024-02-19
Payer: COMMERCIAL

## 2024-02-19 ENCOUNTER — OFFICE VISIT (OUTPATIENT)
Dept: MATERNAL FETAL MEDICINE | Facility: CLINIC | Age: 39
End: 2024-02-19
Payer: COMMERCIAL

## 2024-02-19 VITALS
SYSTOLIC BLOOD PRESSURE: 131 MMHG | HEART RATE: 86 BPM | BODY MASS INDEX: 37.03 KG/M2 | WEIGHT: 209 LBS | HEIGHT: 63 IN | DIASTOLIC BLOOD PRESSURE: 86 MMHG

## 2024-02-19 DIAGNOSIS — O09.523 MULTIGRAVIDA OF ADVANCED MATERNAL AGE IN THIRD TRIMESTER: ICD-10-CM

## 2024-02-19 DIAGNOSIS — Z86.32 HISTORY OF INSULIN CONTROLLED GESTATIONAL DIABETES MELLITUS (GDM): ICD-10-CM

## 2024-02-19 DIAGNOSIS — O09.90 AT HIGH RISK FOR COMPLICATIONS OF INTRAUTERINE PREGNANCY (IUP): Primary | ICD-10-CM

## 2024-02-19 DIAGNOSIS — O34.33 CERVICAL INSUFFICIENCY DURING PREGNANCY IN THIRD TRIMESTER, ANTEPARTUM: ICD-10-CM

## 2024-02-19 DIAGNOSIS — Z86.39 HISTORY OF HYPOTHYROIDISM: ICD-10-CM

## 2024-02-19 DIAGNOSIS — O99.213 OBESITY AFFECTING PREGNANCY IN THIRD TRIMESTER, UNSPECIFIED OBESITY TYPE: ICD-10-CM

## 2024-02-19 DIAGNOSIS — O10.013 PRE-EXISTING ESSENTIAL HYPERTENSION AFFECTING PREGNANCY IN THIRD TRIMESTER: ICD-10-CM

## 2024-02-19 DIAGNOSIS — Z98.890 HISTORY OF CERCLAGE, CURRENTLY PREGNANT, SECOND TRIMESTER: ICD-10-CM

## 2024-02-19 DIAGNOSIS — O09.293 HX OF PREECLAMPSIA, PRIOR PREGNANCY, CURRENTLY PREGNANT, THIRD TRIMESTER: ICD-10-CM

## 2024-02-19 DIAGNOSIS — O24.414 INSULIN CONTROLLED GESTATIONAL DIABETES MELLITUS (GDM) IN THIRD TRIMESTER: ICD-10-CM

## 2024-02-19 DIAGNOSIS — O09.292 HISTORY OF CERCLAGE, CURRENTLY PREGNANT, SECOND TRIMESTER: ICD-10-CM

## 2024-02-19 DIAGNOSIS — O26.23 HABITUAL ABORTER, CURRENTLY PREGNANT IN THIRD TRIMESTER: ICD-10-CM

## 2024-02-19 PROCEDURE — 3079F DIAST BP 80-89 MM HG: CPT | Mod: CPTII,S$GLB,, | Performed by: OBSTETRICS & GYNECOLOGY

## 2024-02-19 PROCEDURE — 1160F RVW MEDS BY RX/DR IN RCRD: CPT | Mod: CPTII,S$GLB,, | Performed by: OBSTETRICS & GYNECOLOGY

## 2024-02-19 PROCEDURE — 3075F SYST BP GE 130 - 139MM HG: CPT | Mod: CPTII,S$GLB,, | Performed by: OBSTETRICS & GYNECOLOGY

## 2024-02-19 PROCEDURE — 1159F MED LIST DOCD IN RCRD: CPT | Mod: CPTII,S$GLB,, | Performed by: OBSTETRICS & GYNECOLOGY

## 2024-02-19 PROCEDURE — 3008F BODY MASS INDEX DOCD: CPT | Mod: CPTII,S$GLB,, | Performed by: OBSTETRICS & GYNECOLOGY

## 2024-02-19 PROCEDURE — 76816 OB US FOLLOW-UP PER FETUS: CPT | Mod: S$GLB,,, | Performed by: OBSTETRICS & GYNECOLOGY

## 2024-02-19 PROCEDURE — 76819 FETAL BIOPHYS PROFIL W/O NST: CPT | Mod: S$GLB,,, | Performed by: OBSTETRICS & GYNECOLOGY

## 2024-02-19 PROCEDURE — 99214 OFFICE O/P EST MOD 30 MIN: CPT | Mod: 25,S$GLB,, | Performed by: OBSTETRICS & GYNECOLOGY

## 2024-02-19 NOTE — PROGRESS NOTES
Maternal Fetal Medicine Follow Up    Subjective     Patient ID: [id number]    Chief Complaint: MFM FOLLOWUP W/US      HPI: Tawnya Love is a 38 y.o. female  at 34w2d gestation with Estimated Date of Delivery: 3/30/24  who is here for follow  up consultation by M.    She has history of chronic hypertension.  She is currently not on antihypertensives. On 2023, 24 hour urine with <6.8 mg protein.  She has history of 3 pregnancy losses and had negative APS testing.   In 2014 pregnancy, she experienced cramping for a few hours and then delivered at 14 weeks gestation. She had ultrasound indicated cerclage in her last pregnancy and was on nightly vaginal progesterone.   She delivered at 36 weeks in that pregnancy due to preeclampsia.  She is status post prophylactic cerclage on 2023.  She has increased BMI of 34 at consult visit. She has history of gestational diabetes treated with metformin and insulin and also has history of PCOS.  She now has recurrent GDM and is on insulin, 14 units of NPH and 8 units of Humalog in the morning, 6 units of Humalog for supper, and 22 units of NPH at bedtime. She has corrective formula of 1 unit of Humalog for every 8 mg over 140.   She has history of hypothyroidism and was previously on Synthroid in her last pregnancy.  She is not currently medication.  On 2023, TSH was normal at 1.256.  Follow-up TSH on 2024 was 0.908.  She is of advanced maternal age and will be 38 by the [facility].  She had negative cell free DNA. She had subchorionic hemorrhage consult ultrasound, not seen on follow-up ultrasound, and denied any vaginal bleeding.  She is is on low-dose aspirin twice daily.       Interval history since last [facility] visit: None.. She denies any leaking fluid, vaginal bleeding, contractions, decreased fetal movement. Denies headaches, visual disturbances, or epigastric pain.    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    Medications:  Current Outpatient Medications   Medication Instructions    aspirin 81 mg Cap     aspirin 81 mg, Oral, 2 times daily    blood sugar diagnostic (ACCU-CHEK GUIDE TEST STRIPS) Strp Use one strip four times daily to check blood sugars.    blood sugar diagnostic Strp 1 each, Misc.(Non-Drug; Combo Route), 4 times daily    blood-glucose meter kit Use as instructed to check blood glucose    insulin aspart U-100 (NOVOLOG) 100 unit/mL injection Inject 6 units at supper. Also, use the corrective dosage of 1 unit for every 8 mg over 140. Max dose 40 units per day.    insulin NPH (NOVOLIN N NPH U-100 INSULIN) 100 unit/mL injection INJECT 12 units in the morning, and 14 units at bedtime.    insulin syringe-needle U-100 1 mL 31 gauge x 5/16 Syrg Use 1 syringe to skin three x's daily.    lancets 30 gauge Misc 1 lancet , Misc.(Non-Drug; Combo Route), 4 times daily    ONETOUCH ULTRA2 METER Misc SMARTSIG:Via Meter As Directed    prenatal vit/iron fum/folic ac (PRENATAL 1+1 ORAL) Oral       Review of Systems   12 point review of systems conducted,  "negative except as stated in the history of present illness. See HPI for details.      Objective     Visit Vitals  /86 (BP Location: Left arm, Patient Position: Sitting, BP Method: Large (Automatic))   Pulse 86   Ht 5' 3" (1.6 m)   Wt 94.8 kg (209 lb)   BMI 37.02 kg/m²         Physical Exam  Vitals and nursing note reviewed.   Constitutional:       Appearance: Normal appearance.      Comments: Increased BMI   HENT:      Head: Normocephalic and atraumatic.      Nose: Nose normal. No congestion.   Cardiovascular:      Rate and Rhythm: Normal rate.   Pulmonary:      Effort: Pulmonary effort is normal.   Skin:     Findings: No rash.   Neurological:      Mental Status: She is alert and oriented to person, place, and time.   Psychiatric:         Mood and Affect: Mood normal.         Behavior: Behavior normal.         Thought Content: Thought content normal.         Judgment: Judgment normal.         ASSESSMENT/PLAN:     38 y.o.  female with IUP at 34w2d    Chronic hypertension in pregnancy  There is upper normal fetal growth with an EFW of 2574 g at the 40% and the AC at the 90%  on 2024.  AFV is normal. BPP is 8/8.     Chronic hypertension complicates up to 2-5% of pregnancies and is associated with significant adverse pregnancy outcomes including  birth, fetal growth restriction, fetal demise, placental abruption, and  delivery.    BP Readings from Last 1 Encounters:   24 131/86      With this BP,  she was advised to continue low salt diet, and hold off on antihypertensive medication at this time.     Low dose aspirin as discussed.    We will do twice weekly fetal testing alternating with Dr. Emanuel.    Among patients with uncomplicated chronic hypertension with no additional risk factors, delivery at 38-39 weeks appears to provide optimal balance between the risk of adverse fetal and  outcomes. If no medication and normal fetal assessment, recommend delivery at 39 weeks. " However, will reassess closer to EDC to determine optimal timeframe or GA for delivery, based on current evaluation at that time.       History of cervical insufficiency and cerclage, s/p prophylactic cerclage  TVUS on 10/16/2023 with normal closed cervix 3.4 cm with cerclage suture in place. There is no need for further vaginal ultrasounds at this time. PTL precautions given.    She is aware of risks associated with cerclage placement and that goal is to decrease risk of  labor and delivery. She is to continue limited activity/home rest with ambulation/exercising BLE for DVT prophylaxis, no tub baths, and pelvic rest. She was instructed to report immediately any abdominal cramping, vaginal bleeding or change in discharge.     Will plan for cerclage removal at 36-37 weeks, unless indicated earlier.  PTL precautions reviewed.        Advanced maternal age  Reviewed risks with AMA, including risks for PTL, FGR, anomalies not seen, aneuploidy and stillbirth at term. She previously declined amniocentesis . She is aware of need for  evaluation. She previously had cell free DNA done with primary OB, requested.    She was advised to do fetal kick counts after 24 weeks and continue till delivery in view of the higher risk of fetal demise in utero closer to term with advanced maternal age.     Fetal surveillance as above.      History of recurrent miscarriages  She previously had negative APS testing.  Expectant management.  PTL precautions given.  Fetal kick count instructions reviewed.        Increased BMI in pregnancy  Body mass index is 37.02 kg/m². With 5 lb gain in 4 weeks.  She will continue healthy and low caloric diet.  Excess weight gain would be associated with gestational hypertension, gestational diabetes and adverse  outcomes, including fetal demise in utero.    With risk factors associated with increased BMI,  will start fetal kick counts at 24 weeks and continue till delivery.    It is  important to lose weight after the pregnancy is over, especially before a future pregnancy was discussed. Breastfeeding may be an important tool in reducing the postpartum weight retention. Fetal risks were discussed with short term risk of fetal/ obesity and long term risk of adolescent component of metabolic syndrome.    With higher risks for gestational diabetes with BMI greater than 30, she had normal early 1hr GCT. Recommend repeat 1hr GCT around 26-28 weeks gestation.       History of hypothyroidism  Risks associated with uncontrolled hypothyroidism, including potential for pregnancy loss, neurologic/cognitive deficit in the baby,  delivery, LBW,  delivery, and gestational hypertension/preeclampsia were previously discussed.    With normal TSH, advised patient to stay off of Synthroid at this time.  She was instructed to report any symptoms of cold/heat intolerance, heart palpitations, fatigue or constipation.       At high risk for preeclampsia with a history of severe preeclampsia requiring delivery at 36 weeks  With her increased risk for preeclampsia, she agreed to start aspirin today and continue asa 81 mg BID until 34 6/7 weeks, then decrease to once daily until delivery. Preeclampsia precautions reviewed.      Recurrent gestational diabetes mellitus  Risks associated with diabetes in pregnancy include higher risk for polyhydramnios, fetal macrosomia, lower Apgar scores and  metabolic complications (hypoglycemia, hyperbilirubinemia, hypocalcemia, erythema) and developing preeclampsia.     Continue 2200 calorie diabetic diet. It is recommended that she have 30 grams of carbohydrates with breakfast, and 60 grams with lunch and dinner. In addition, she should have three snacks in between meals with 15 grams of carbohydrates and at bedtime with 30 grams of carbohydrates. The importance of avoiding any significant weight gain till the end of the pregnancy was reviewed.                   Log reviewed. Patient advised to adjust to 18 units of NPH and 8 units of Humalog in the morning, 6 units of Humalog for supper, and 26 units of NPH at bedtime. Continue rescue dose of Humalog for 1 unit for every 7 mg of sugar over 140.     The patient was instructed to check blood sugar four times per day and call me in a few days with her blood sugars to make further adjustments of dose of Insulin. The goal of treatment is to keep pre-prandial blood sugars below 90 and one hour postprandial below 140.     See fetal testing as above.  She was advised to continue fetal kick counts 3 times daily, with immediate reporting of decreased fetal movements (<10 movements/hr).     With good BS control and stable gestational diabetes, on insulin or metformin, recommend delivery at 39 weeks gestation.     The association of gestational diabetes (50%) with adult-onset type 2 diabetes mellitus was reviewed.  The importance of losing weight after the pregnancy was emphasized, as well as doing a 75 g sugar test after postpartum exam and to have annual checkups every 1-3 years for blood sugars to make sure she does not develop diabetes.      Follow up in about 1 week (around 2/26/2024) for [facility] follow-up, BPP.     Chronic hypertension stable.  Patient will continue taking aspirin twice a day for another 4 days then switch to twice a day after that.  Her blood sugars were reviewed and some elevated fasting blood sugars and some afternoon elevations.  I adjusted dose of insulin as above.  Continue low-salt diet.  Fetal kick count instructions.  Preeclampsia warnings.      Future Appointments   Date Time Provider Department Center   2/26/2024  1:30 PM Tobias Alfonso MD [facility]   2/26/2024  1:30 PM ROOM 3, [facility]   12/2/2024  2:20 PM Shawna Stewart MD [place] [id number]        AARON involvement: Patient was evaluated and examined by Dr. Alfonso. MARIBEL Shah, helped as  scribe in  completion of part of note.    This note was created with the assistance of [employer] voice recognition software. There may be transcription errors as a result of using this technology, however minimal. Effort has been made to ensure accuracy of transcription, but any obvious errors or omissions should be clarified with the author of the document.

## 2024-02-20 DIAGNOSIS — O10.012 PRE-EXISTING ESSENTIAL HYPERTENSION AFFECTING PREGNANCY IN SECOND TRIMESTER: ICD-10-CM

## 2024-02-22 DIAGNOSIS — O10.013 PRE-EXISTING ESSENTIAL HYPERTENSION AFFECTING PREGNANCY IN THIRD TRIMESTER: Primary | ICD-10-CM

## 2024-02-26 ENCOUNTER — PROCEDURE VISIT (OUTPATIENT)
Dept: MATERNAL FETAL MEDICINE | Facility: CLINIC | Age: 39
End: 2024-02-26
Payer: COMMERCIAL

## 2024-02-26 ENCOUNTER — OFFICE VISIT (OUTPATIENT)
Dept: MATERNAL FETAL MEDICINE | Facility: CLINIC | Age: 39
End: 2024-02-26
Payer: COMMERCIAL

## 2024-02-26 VITALS
WEIGHT: 208.81 LBS | SYSTOLIC BLOOD PRESSURE: 130 MMHG | HEART RATE: 88 BPM | DIASTOLIC BLOOD PRESSURE: 82 MMHG | HEIGHT: 63 IN | BODY MASS INDEX: 37 KG/M2

## 2024-02-26 DIAGNOSIS — O09.292 HISTORY OF CERCLAGE, CURRENTLY PREGNANT, SECOND TRIMESTER: ICD-10-CM

## 2024-02-26 DIAGNOSIS — O26.23 HABITUAL ABORTER, CURRENTLY PREGNANT IN THIRD TRIMESTER: ICD-10-CM

## 2024-02-26 DIAGNOSIS — O34.33 CERVICAL INSUFFICIENCY DURING PREGNANCY IN THIRD TRIMESTER, ANTEPARTUM: ICD-10-CM

## 2024-02-26 DIAGNOSIS — O99.213 OBESITY AFFECTING PREGNANCY IN THIRD TRIMESTER, UNSPECIFIED OBESITY TYPE: ICD-10-CM

## 2024-02-26 DIAGNOSIS — O10.013 PRE-EXISTING ESSENTIAL HYPERTENSION AFFECTING PREGNANCY IN THIRD TRIMESTER: ICD-10-CM

## 2024-02-26 DIAGNOSIS — O24.414 INSULIN CONTROLLED GESTATIONAL DIABETES MELLITUS (GDM) IN THIRD TRIMESTER: ICD-10-CM

## 2024-02-26 DIAGNOSIS — O09.90 AT HIGH RISK FOR COMPLICATIONS OF INTRAUTERINE PREGNANCY (IUP): Primary | ICD-10-CM

## 2024-02-26 DIAGNOSIS — Z86.39 HISTORY OF HYPOTHYROIDISM: ICD-10-CM

## 2024-02-26 DIAGNOSIS — O09.523 MULTIGRAVIDA OF ADVANCED MATERNAL AGE IN THIRD TRIMESTER: ICD-10-CM

## 2024-02-26 DIAGNOSIS — O09.293 HX OF PREECLAMPSIA, PRIOR PREGNANCY, CURRENTLY PREGNANT, THIRD TRIMESTER: ICD-10-CM

## 2024-02-26 DIAGNOSIS — Z98.890 HISTORY OF CERCLAGE, CURRENTLY PREGNANT, SECOND TRIMESTER: ICD-10-CM

## 2024-02-26 DIAGNOSIS — Z86.32 HISTORY OF INSULIN CONTROLLED GESTATIONAL DIABETES MELLITUS (GDM): ICD-10-CM

## 2024-02-26 PROCEDURE — 1159F MED LIST DOCD IN RCRD: CPT | Mod: CPTII,S$GLB,, | Performed by: OBSTETRICS & GYNECOLOGY

## 2024-02-26 PROCEDURE — 99214 OFFICE O/P EST MOD 30 MIN: CPT | Mod: 25,S$GLB,, | Performed by: OBSTETRICS & GYNECOLOGY

## 2024-02-26 PROCEDURE — 1160F RVW MEDS BY RX/DR IN RCRD: CPT | Mod: CPTII,S$GLB,, | Performed by: OBSTETRICS & GYNECOLOGY

## 2024-02-26 PROCEDURE — 76819 FETAL BIOPHYS PROFIL W/O NST: CPT | Mod: S$GLB,,, | Performed by: OBSTETRICS & GYNECOLOGY

## 2024-02-26 PROCEDURE — 3008F BODY MASS INDEX DOCD: CPT | Mod: CPTII,S$GLB,, | Performed by: OBSTETRICS & GYNECOLOGY

## 2024-02-26 PROCEDURE — 3075F SYST BP GE 130 - 139MM HG: CPT | Mod: CPTII,S$GLB,, | Performed by: OBSTETRICS & GYNECOLOGY

## 2024-02-26 PROCEDURE — 3079F DIAST BP 80-89 MM HG: CPT | Mod: CPTII,S$GLB,, | Performed by: OBSTETRICS & GYNECOLOGY

## 2024-02-26 RX ORDER — HUMAN INSULIN 100 [IU]/ML
INJECTION, SUSPENSION SUBCUTANEOUS
Qty: 20 ML | Refills: 3 | Status: ON HOLD | OUTPATIENT
Start: 2024-02-26 | End: 2024-03-05 | Stop reason: HOSPADM

## 2024-02-26 RX ORDER — SYRINGE,SAFETY WITH NEEDLE,1ML 25GX1"
SYRINGE (EA) MISCELLANEOUS
Qty: 90 EACH | Refills: 3 | Status: ON HOLD | OUTPATIENT
Start: 2024-02-26 | End: 2024-03-14 | Stop reason: HOSPADM

## 2024-02-26 NOTE — PROGRESS NOTES
Maternal Fetal Medicine Follow Up    Subjective     Patient ID: [id number]    Chief Complaint: mfm followup w/us      HPI: Tawnya Love is a 38 y.o. female  at 35w2d gestation with Estimated Date of Delivery: 3/30/24  who is here for follow  up consultation by M.    She has history of chronic hypertension.  She is currently not on antihypertensives. On 2023, 24 hour urine with <6.8 mg protein.  She has history of 3 pregnancy losses and had negative APS testing.   In 2014 pregnancy, she experienced cramping for a few hours and then delivered at 14 weeks gestation. She had ultrasound indicated cerclage in her last pregnancy and was on nightly vaginal progesterone.   She delivered at 36 weeks in that pregnancy due to preeclampsia.  She is status post prophylactic cerclage on 2023.  She has increased BMI of 34 at consult visit. She has history of gestational diabetes treated with metformin and insulin and also has history of PCOS.  She now has recurrent GDM and is on insulin, 18 units of NPH and 8 units of Humalog in the morning, 6 units of Humalog for supper, and 26 units of NPH at bedtime. She has corrective formula of 1 unit of Humalog for every 8 mg over 140.   She has history of hypothyroidism and was previously on Synthroid in her last pregnancy.  She is not currently medication.  On 2023, TSH was normal at 1.256.  Follow-up TSH on 2024 was 0.908.  She is of advanced maternal age and will be 38 by the [facility].  She had negative cell free DNA. She had subchorionic hemorrhage consult ultrasound, not seen on follow-up ultrasound, and denied any vaginal bleeding.  She is is on low-dose aspirin daily.       Interval history since last [facility] visit: None.. She denies any leaking fluid, vaginal bleeding, contractions, decreased fetal movement. Denies headaches, visual disturbances, or epigastric pain.    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    Medications:  Current Outpatient Medications   Medication Instructions    aspirin 81 mg Cap     aspirin 81 mg, Oral, 2 times daily    blood sugar diagnostic (ONETOUCH ULTRA TEST) Strp USE ONE STRIP FOUR TIMES DAILY TO CHECK BLOOD SUGARS.    blood sugar diagnostic Strp 1 each, Misc.(Non-Drug; Combo Route), 4 times daily    blood-glucose meter kit Use as instructed to check blood glucose    insulin aspart U-100 (NOVOLOG) 100 unit/mL injection Inject 6 units at supper. Also, use the corrective dosage of 1 unit for every 8 mg over 140. Max dose 40 units per day.    insulin NPH (NOVOLIN N NPH U-100 INSULIN) 100 unit/mL injection INJECT 12 units in the morning, and 14 units at bedtime.    insulin syringe-needle U-100 1 mL 31 gauge x 5/16 Syrg Use 1 syringe to skin three x's daily.    lancets 30 gauge Misc 1 lancet , Misc.(Non-Drug; Combo Route), 4 times daily    ONETOUCH ULTRA2 METER Misc SMARTSIG:Via Meter As Directed    prenatal vit/iron fum/folic ac (PRENATAL 1+1 ORAL) Oral       Review of Systems   12 point review of systems conducted, negative except as  "stated in the history of present illness. See HPI for details.      Objective     Visit Vitals  /82 (BP Location: Left arm, Patient Position: Sitting, BP Method: Large (Manual))   Pulse 88   Ht 5' 3" (1.6 m)   Wt 94.7 kg (208 lb 12.8 oz)   BMI 36.99 kg/m²           Physical Exam  Vitals and nursing note reviewed.   Constitutional:       Appearance: Normal appearance.      Comments: Increased BMI   HENT:      Head: Normocephalic and atraumatic.      Nose: Nose normal. No congestion.   Cardiovascular:      Rate and Rhythm: Normal rate.   Pulmonary:      Effort: Pulmonary effort is normal.   Skin:     Findings: No rash.   Neurological:      Mental Status: She is alert and oriented to person, place, and time.   Psychiatric:         Mood and Affect: Mood normal.         Behavior: Behavior normal.         Thought Content: Thought content normal.         Judgment: Judgment normal.         ASSESSMENT/PLAN:     38 y.o.  female with IUP at 35w2d    Chronic hypertension in pregnancy  There is upper normal fetal growth with an EFW of 2574 g at the 40% and the AC at the 90% on 2024.  AFV is normal. BPP is 8/8.     Chronic hypertension complicates up to 2-5% of pregnancies and is associated with significant adverse pregnancy outcomes including  birth, fetal growth restriction, fetal demise, placental abruption, and  delivery.    BP Readings from Last 1 Encounters:   24 130/82      With this BP, and another reading of 140/93,  she was advised to continue low salt diet, and hold off on antihypertensive medication at this time.     Low dose aspirin as discussed.    We will do twice weekly fetal testing alternating with Dr. Emanuel until delivery.    Among patients with uncomplicated chronic hypertension with no additional risk factors, delivery at 38-39 weeks appears to provide optimal balance between the risk of adverse fetal and  outcomes. If no medication and normal fetal assessment, " recommend delivery at 39 weeks. However, will reassess closer to EDC to determine optimal timeframe or GA for delivery, based on current evaluation at that time.       History of cervical insufficiency and cerclage, s/p prophylactic cerclage  TVUS on 10/16/2023 with normal closed cervix 3.4 cm with cerclage suture in place. There is no need for further vaginal ultrasounds at this time. PTL precautions given.    She is aware of risks associated with cerclage placement and that goal is to decrease risk of  labor and delivery. She is to continue limited activity/home rest with ambulation/exercising BLE for DVT prophylaxis, no tub baths, and pelvic rest. She was instructed to report immediately any abdominal cramping, vaginal bleeding or change in discharge.     Will plan for cerclage removal at 36-37 weeks, unless indicated earlier.  PTL precautions reviewed.        Advanced maternal age  Reviewed risks with AMA, including risks for PTL, FGR, anomalies not seen, aneuploidy and stillbirth at term. She previously declined amniocentesis . She is aware of need for  evaluation. She previously had cell free DNA done with primary OB, requested.    She was advised to do fetal kick counts after 24 weeks and continue till delivery in view of the higher risk of fetal demise in utero closer to term with advanced maternal age.     Fetal surveillance as above.      History of recurrent miscarriages  She previously had negative APS testing.  Expectant management.  PTL precautions given.  Fetal kick count instructions reviewed.        Increased BMI in pregnancy  Body mass index is 36.99 kg/m². With stable weight.  She will continue healthy and low caloric diet.  Excess weight gain would be associated with gestational hypertension, gestational diabetes and adverse  outcomes, including fetal demise in utero.    With risk factors associated with increased BMI,  will start fetal kick counts at 24 weeks and continue  till delivery.    It is important to lose weight after the pregnancy is over, especially before a future pregnancy was discussed. Breastfeeding may be an important tool in reducing the postpartum weight retention. Fetal risks were discussed with short term risk of fetal/ obesity and long term risk of adolescent component of metabolic syndrome.      History of hypothyroidism  Risks associated with uncontrolled hypothyroidism, including potential for pregnancy loss, neurologic/cognitive deficit in the baby,  delivery, LBW,  delivery, and gestational hypertension/preeclampsia were previously discussed.    With normal TSH, advised patient to stay off of Synthroid at this time.  She was instructed to report any symptoms of cold/heat intolerance, heart palpitations, fatigue or constipation.       At high risk for preeclampsia with a history of severe preeclampsia requiring delivery at 36 weeks  With her increased risk for preeclampsia, she agreed to continue low-dose aspirin daily until delivery. Preeclampsia precautions reviewed.      Recurrent gestational diabetes mellitus  Risks associated with diabetes in pregnancy include higher risk for polyhydramnios, fetal macrosomia, lower Apgar scores and  metabolic complications (hypoglycemia, hyperbilirubinemia, hypocalcemia, erythema) and developing preeclampsia.     Continue 2200 calorie diabetic diet. It is recommended that she have 30 grams of carbohydrates with breakfast, and 60 grams with lunch and dinner. In addition, she should have three snacks in between meals with 15 grams of carbohydrates and at bedtime with 30 grams of carbohydrates. The importance of avoiding any significant weight gain till the end of the pregnancy was reviewed.                  Log reviewed. Patient advised to adjust to 18 units of NPH and12 units of Humalog in the morning, 6 units of Humalog for supper, and 32 units of NPH at bedtime. Continue rescue dose of  Humalog for 1 unit for every 7 mg of sugar over 140.     The patient was instructed to check blood sugar four times per day and call me in a few days with her blood sugars to make further adjustments of dose of Insulin. The goal of treatment is to keep pre-prandial blood sugars below 90 and one hour postprandial below 140.     See fetal testing as above.  She was advised to continue fetal kick counts 3 times daily, with immediate reporting of decreased fetal movements (<10 movements/hr).     With good BS control and stable gestational diabetes, on insulin or metformin, recommend delivery at 39 weeks gestation.     The association of gestational diabetes (50%) with adult-onset type 2 diabetes mellitus was reviewed.  The importance of losing weight after the pregnancy was emphasized, as well as doing a 75 g sugar test after postpartum exam and to have annual checkups every 1-3 years for blood sugars to make sure she does not develop diabetes.      Follow up in about 1 week (around 3/4/2024) for MFM follow-up, BPP.     I adjusted dose of insulin as above.  Blood pressure is a little labile and borderline.  Reviewed the options of starting treatment now and agreed to avoided at this time with 1 reading at 140/93 and a reading of 130/82.  However if the blood pressure continues to get any higher than we will start some antihypertensive treatment.  Fetal kick count instructions and preeclampsia warnings.      Future Appointments   Date Time Provider Department Center   12/2/2024  2:20 PM Shawna Stewart MD [place] [id number]        AARON involvement: Patient was evaluated and examined by Dr. Alfonso. MARIBEL Shah, helped as  scribe in completion of part of note.    This note was created with the assistance of [employer] voice recognition software. There may be transcription errors as a result of using this technology, however minimal. Effort has been made to ensure accuracy of transcription, but any obvious errors  or omissions should be clarified with the author of the document.

## 2024-02-28 ENCOUNTER — TELEPHONE (OUTPATIENT)
Dept: MATERNAL FETAL MEDICINE | Facility: CLINIC | Age: 39
End: 2024-02-28
Payer: COMMERCIAL

## 2024-02-29 DIAGNOSIS — Z86.32 HISTORY OF INSULIN CONTROLLED GESTATIONAL DIABETES MELLITUS (GDM): ICD-10-CM

## 2024-02-29 DIAGNOSIS — O10.013 PRE-EXISTING ESSENTIAL HYPERTENSION AFFECTING PREGNANCY IN THIRD TRIMESTER: Primary | ICD-10-CM

## 2024-02-29 DIAGNOSIS — Z86.39 HISTORY OF HYPOTHYROIDISM: ICD-10-CM

## 2024-03-04 ENCOUNTER — HOSPITAL ENCOUNTER (OUTPATIENT)
Facility: HOSPITAL | Age: 39
Discharge: HOME | End: 2024-03-05
Attending: STUDENT IN AN ORGANIZED HEALTH CARE EDUCATION/TRAINING PROGRAM | Admitting: STUDENT IN AN ORGANIZED HEALTH CARE EDUCATION/TRAINING PROGRAM
Payer: COMMERCIAL

## 2024-03-04 ENCOUNTER — DOCUMENTATION ONLY (OUTPATIENT)
Dept: MATERNAL FETAL MEDICINE | Facility: CLINIC | Age: 39
End: 2024-03-04

## 2024-03-04 ENCOUNTER — OFFICE VISIT (OUTPATIENT)
Dept: MATERNAL FETAL MEDICINE | Facility: CLINIC | Age: 39
End: 2024-03-04
Payer: COMMERCIAL

## 2024-03-04 ENCOUNTER — PROCEDURE VISIT (OUTPATIENT)
Dept: MATERNAL FETAL MEDICINE | Facility: CLINIC | Age: 39
End: 2024-03-04
Payer: COMMERCIAL

## 2024-03-04 VITALS
BODY MASS INDEX: 37.45 KG/M2 | HEART RATE: 92 BPM | WEIGHT: 211.38 LBS | HEIGHT: 63 IN | SYSTOLIC BLOOD PRESSURE: 180 MMHG | DIASTOLIC BLOOD PRESSURE: 120 MMHG

## 2024-03-04 DIAGNOSIS — O09.293 HX OF PREECLAMPSIA, PRIOR PREGNANCY, CURRENTLY PREGNANT, THIRD TRIMESTER: ICD-10-CM

## 2024-03-04 DIAGNOSIS — Z86.39 HISTORY OF HYPOTHYROIDISM: ICD-10-CM

## 2024-03-04 DIAGNOSIS — O99.213 OBESITY AFFECTING PREGNANCY IN THIRD TRIMESTER, UNSPECIFIED OBESITY TYPE: ICD-10-CM

## 2024-03-04 DIAGNOSIS — O24.414 INSULIN CONTROLLED GESTATIONAL DIABETES MELLITUS (GDM) IN THIRD TRIMESTER: ICD-10-CM

## 2024-03-04 DIAGNOSIS — Z98.890 HISTORY OF CERCLAGE, CURRENTLY PREGNANT, SECOND TRIMESTER: ICD-10-CM

## 2024-03-04 DIAGNOSIS — E74.89: ICD-10-CM

## 2024-03-04 DIAGNOSIS — O34.33 CERVICAL INSUFFICIENCY DURING PREGNANCY IN THIRD TRIMESTER, ANTEPARTUM: ICD-10-CM

## 2024-03-04 DIAGNOSIS — O10.013 PRE-EXISTING ESSENTIAL HYPERTENSION AFFECTING PREGNANCY IN THIRD TRIMESTER: ICD-10-CM

## 2024-03-04 DIAGNOSIS — O09.292 HISTORY OF CERCLAGE, CURRENTLY PREGNANT, SECOND TRIMESTER: ICD-10-CM

## 2024-03-04 DIAGNOSIS — O16.9 HYPERTENSION AFFECTING PREGNANCY: ICD-10-CM

## 2024-03-04 DIAGNOSIS — O10.012 PRE-EXISTING ESSENTIAL HYPERTENSION AFFECTING PREGNANCY IN SECOND TRIMESTER: ICD-10-CM

## 2024-03-04 DIAGNOSIS — Z86.32 HISTORY OF INSULIN CONTROLLED GESTATIONAL DIABETES MELLITUS (GDM): ICD-10-CM

## 2024-03-04 DIAGNOSIS — O09.523 MULTIGRAVIDA OF ADVANCED MATERNAL AGE IN THIRD TRIMESTER: ICD-10-CM

## 2024-03-04 DIAGNOSIS — O09.90 AT HIGH RISK FOR COMPLICATIONS OF INTRAUTERINE PREGNANCY (IUP): Primary | ICD-10-CM

## 2024-03-04 DIAGNOSIS — O24.414 INSULIN CONTROLLED GESTATIONAL DIABETES MELLITUS (GDM) IN THIRD TRIMESTER: Primary | ICD-10-CM

## 2024-03-04 DIAGNOSIS — O26.23 HABITUAL ABORTER, CURRENTLY PREGNANT IN THIRD TRIMESTER: ICD-10-CM

## 2024-03-04 LAB
ALBUMIN SERPL-MCNC: 2.5 G/DL (ref 3.5–5)
ALBUMIN/GLOB SERPL: 0.7 RATIO (ref 1.1–2)
ALP SERPL-CCNC: 196 UNIT/L (ref 40–150)
ALT SERPL-CCNC: 24 UNIT/L (ref 0–55)
AST SERPL-CCNC: 18 UNIT/L (ref 5–34)
BASOPHILS # BLD AUTO: 0.02 X10(3)/MCL
BASOPHILS NFR BLD AUTO: 0.4 %
BILIRUB SERPL-MCNC: 0.4 MG/DL
BUN SERPL-MCNC: 7.5 MG/DL (ref 7–18.7)
CALCIUM SERPL-MCNC: 8.8 MG/DL (ref 8.4–10.2)
CHLORIDE SERPL-SCNC: 108 MMOL/L (ref 98–107)
CO2 SERPL-SCNC: 21 MMOL/L (ref 22–29)
CREAT SERPL-MCNC: 0.68 MG/DL (ref 0.55–1.02)
EOSINOPHIL # BLD AUTO: 0.09 X10(3)/MCL (ref 0–0.9)
EOSINOPHIL NFR BLD AUTO: 1.7 %
ERYTHROCYTE [DISTWIDTH] IN BLOOD BY AUTOMATED COUNT: 16.4 % (ref 11.5–17)
GFR SERPLBLD CREATININE-BSD FMLA CKD-EPI: >60 MLS/MIN/1.73/M2
GLOBULIN SER-MCNC: 3.5 GM/DL (ref 2.4–3.5)
GLUCOSE SERPL-MCNC: 59 MG/DL (ref 74–100)
GROUP & RH: NORMAL
HCT VFR BLD AUTO: 31.5 % (ref 37–47)
HGB BLD-MCNC: 10.2 G/DL (ref 12–16)
IMM GRANULOCYTES # BLD AUTO: 0.02 X10(3)/MCL (ref 0–0.04)
IMM GRANULOCYTES NFR BLD AUTO: 0.4 %
INDIRECT COOMBS: NORMAL
LDH SERPL-CCNC: 168 U/L (ref 125–220)
LYMPHOCYTES # BLD AUTO: 1.72 X10(3)/MCL (ref 0.6–4.6)
LYMPHOCYTES NFR BLD AUTO: 31.7 %
MCH RBC QN AUTO: 26.7 PG (ref 27–31)
MCHC RBC AUTO-ENTMCNC: 32.4 G/DL (ref 33–36)
MCV RBC AUTO: 82.5 FL (ref 80–94)
MONOCYTES # BLD AUTO: 0.5 X10(3)/MCL (ref 0.1–1.3)
MONOCYTES NFR BLD AUTO: 9.2 %
NEUTROPHILS # BLD AUTO: 3.08 X10(3)/MCL (ref 2.1–9.2)
NEUTROPHILS NFR BLD AUTO: 56.6 %
NRBC BLD AUTO-RTO: 0 %
PLATELET # BLD AUTO: 250 X10(3)/MCL (ref 130–400)
PMV BLD AUTO: 11.1 FL (ref 7.4–10.4)
POCT GLUCOSE: 177 MG/DL (ref 70–110)
POCT GLUCOSE: 82 MG/DL (ref 70–110)
POCT GLUCOSE: 90 MG/DL (ref 70–110)
POTASSIUM SERPL-SCNC: 3.9 MMOL/L (ref 3.5–5.1)
PROT SERPL-MCNC: 6 GM/DL (ref 6.4–8.3)
RBC # BLD AUTO: 3.82 X10(6)/MCL (ref 4.2–5.4)
SODIUM SERPL-SCNC: 138 MMOL/L (ref 136–145)
SPECIMEN OUTDATE: NORMAL
T PALLIDUM AB SER QL: NONREACTIVE
URATE SERPL-MCNC: 4.6 MG/DL (ref 2.6–6)
WBC # SPEC AUTO: 5.43 X10(3)/MCL (ref 4.5–11.5)

## 2024-03-04 PROCEDURE — 83615 LACTATE (LD) (LDH) ENZYME: CPT | Performed by: STUDENT IN AN ORGANIZED HEALTH CARE EDUCATION/TRAINING PROGRAM

## 2024-03-04 PROCEDURE — 1159F MED LIST DOCD IN RCRD: CPT | Mod: CPTII,S$GLB,,

## 2024-03-04 PROCEDURE — 96372 THER/PROPH/DIAG INJ SC/IM: CPT

## 2024-03-04 PROCEDURE — 84550 ASSAY OF BLOOD/URIC ACID: CPT | Performed by: STUDENT IN AN ORGANIZED HEALTH CARE EDUCATION/TRAINING PROGRAM

## 2024-03-04 PROCEDURE — 63600175 PHARM REV CODE 636 W HCPCS: Performed by: STUDENT IN AN ORGANIZED HEALTH CARE EDUCATION/TRAINING PROGRAM

## 2024-03-04 PROCEDURE — 3008F BODY MASS INDEX DOCD: CPT | Mod: CPTII,S$GLB,,

## 2024-03-04 PROCEDURE — 85025 COMPLETE CBC W/AUTO DIFF WBC: CPT | Performed by: STUDENT IN AN ORGANIZED HEALTH CARE EDUCATION/TRAINING PROGRAM

## 2024-03-04 PROCEDURE — 86780 TREPONEMA PALLIDUM: CPT | Performed by: STUDENT IN AN ORGANIZED HEALTH CARE EDUCATION/TRAINING PROGRAM

## 2024-03-04 PROCEDURE — 76819 FETAL BIOPHYS PROFIL W/O NST: CPT | Mod: S$GLB,,, | Performed by: OBSTETRICS & GYNECOLOGY

## 2024-03-04 PROCEDURE — 80053 COMPREHEN METABOLIC PANEL: CPT | Performed by: STUDENT IN AN ORGANIZED HEALTH CARE EDUCATION/TRAINING PROGRAM

## 2024-03-04 PROCEDURE — 25000003 PHARM REV CODE 250: Performed by: OBSTETRICS & GYNECOLOGY

## 2024-03-04 PROCEDURE — 63600175 PHARM REV CODE 636 W HCPCS: Mod: JZ,JG | Performed by: OBSTETRICS & GYNECOLOGY

## 2024-03-04 PROCEDURE — 3077F SYST BP >= 140 MM HG: CPT | Mod: CPTII,S$GLB,,

## 2024-03-04 PROCEDURE — 3080F DIAST BP >= 90 MM HG: CPT | Mod: CPTII,S$GLB,,

## 2024-03-04 PROCEDURE — 25000003 PHARM REV CODE 250: Performed by: STUDENT IN AN ORGANIZED HEALTH CARE EDUCATION/TRAINING PROGRAM

## 2024-03-04 PROCEDURE — 99214 OFFICE O/P EST MOD 30 MIN: CPT | Mod: S$GLB,,,

## 2024-03-04 PROCEDURE — 1160F RVW MEDS BY RX/DR IN RCRD: CPT | Mod: CPTII,S$GLB,,

## 2024-03-04 PROCEDURE — 86901 BLOOD TYPING SEROLOGIC RH(D): CPT | Performed by: STUDENT IN AN ORGANIZED HEALTH CARE EDUCATION/TRAINING PROGRAM

## 2024-03-04 RX ORDER — LABETALOL 200 MG/1
400 TABLET, FILM COATED ORAL EVERY 12 HOURS
Status: DISCONTINUED | OUTPATIENT
Start: 2024-03-05 | End: 2024-03-05

## 2024-03-04 RX ORDER — HYDRALAZINE HYDROCHLORIDE 20 MG/ML
10 INJECTION INTRAMUSCULAR; INTRAVENOUS ONCE AS NEEDED
Status: DISCONTINUED | OUTPATIENT
Start: 2024-03-04 | End: 2024-03-05 | Stop reason: HOSPADM

## 2024-03-04 RX ORDER — LANCETS 33 GAUGE
EACH MISCELLANEOUS 4 TIMES DAILY
Status: ON HOLD | COMMUNITY
Start: 2024-02-28 | End: 2024-03-14 | Stop reason: HOSPADM

## 2024-03-04 RX ORDER — BLOOD-GLUCOSE CONTROL, NORMAL
1 EACH MISCELLANEOUS 4 TIMES DAILY
Qty: 100 EACH | Refills: 4 | Status: ON HOLD | OUTPATIENT
Start: 2024-03-04 | End: 2024-03-14 | Stop reason: HOSPADM

## 2024-03-04 RX ORDER — INSULIN ASPART 100 [IU]/ML
12 INJECTION, SOLUTION INTRAVENOUS; SUBCUTANEOUS
Status: DISCONTINUED | OUTPATIENT
Start: 2024-03-05 | End: 2024-03-05

## 2024-03-04 RX ORDER — LABETALOL HCL 20 MG/4 ML
40 SYRINGE (ML) INTRAVENOUS ONCE AS NEEDED
Status: COMPLETED | OUTPATIENT
Start: 2024-03-04 | End: 2024-03-04

## 2024-03-04 RX ORDER — LABETALOL 200 MG/1
200 TABLET, FILM COATED ORAL EVERY 12 HOURS
Status: DISCONTINUED | OUTPATIENT
Start: 2024-03-04 | End: 2024-03-04

## 2024-03-04 RX ORDER — LABETALOL HCL 20 MG/4 ML
80 SYRINGE (ML) INTRAVENOUS ONCE AS NEEDED
Status: COMPLETED | OUTPATIENT
Start: 2024-03-04 | End: 2024-03-04

## 2024-03-04 RX ORDER — INSULIN ASPART 100 [IU]/ML
6 INJECTION, SOLUTION INTRAVENOUS; SUBCUTANEOUS
Status: DISCONTINUED | OUTPATIENT
Start: 2024-03-05 | End: 2024-03-05

## 2024-03-04 RX ORDER — LABETALOL 200 MG/1
200 TABLET, FILM COATED ORAL ONCE
Status: COMPLETED | OUTPATIENT
Start: 2024-03-04 | End: 2024-03-04

## 2024-03-04 RX ORDER — BETAMETHASONE SODIUM PHOSPHATE AND BETAMETHASONE ACETATE 3; 3 MG/ML; MG/ML
12 INJECTION, SUSPENSION INTRA-ARTICULAR; INTRALESIONAL; INTRAMUSCULAR; SOFT TISSUE
Status: COMPLETED | OUTPATIENT
Start: 2024-03-04 | End: 2024-03-05

## 2024-03-04 RX ORDER — LABETALOL HCL 20 MG/4 ML
20 SYRINGE (ML) INTRAVENOUS ONCE AS NEEDED
Status: COMPLETED | OUTPATIENT
Start: 2024-03-04 | End: 2024-03-04

## 2024-03-04 RX ORDER — INSULIN ASPART 100 [IU]/ML
1-10 INJECTION, SOLUTION INTRAVENOUS; SUBCUTANEOUS
Status: DISCONTINUED | OUTPATIENT
Start: 2024-03-04 | End: 2024-03-05 | Stop reason: HOSPADM

## 2024-03-04 RX ADMIN — INSULIN HUMAN 32 UNITS: 100 INJECTION, SUSPENSION SUBCUTANEOUS at 10:03

## 2024-03-04 RX ADMIN — LABETALOL HYDROCHLORIDE 80 MG: 5 INJECTION, SOLUTION INTRAVENOUS at 05:03

## 2024-03-04 RX ADMIN — LABETALOL HYDROCHLORIDE 200 MG: 200 TABLET, FILM COATED ORAL at 04:03

## 2024-03-04 RX ADMIN — LABETALOL HYDROCHLORIDE 20 MG: 5 INJECTION, SOLUTION INTRAVENOUS at 04:03

## 2024-03-04 RX ADMIN — BETAMETHASONE SODIUM PHOSPHATE AND BETAMETHASONE ACETATE 12 MG: 3; 3 INJECTION, SUSPENSION INTRA-ARTICULAR; INTRALESIONAL; INTRAMUSCULAR at 04:03

## 2024-03-04 RX ADMIN — LABETALOL HYDROCHLORIDE 40 MG: 5 INJECTION, SOLUTION INTRAVENOUS at 05:03

## 2024-03-04 RX ADMIN — INSULIN ASPART 4 UNITS: 100 INJECTION, SOLUTION INTRAVENOUS; SUBCUTANEOUS at 10:03

## 2024-03-04 RX ADMIN — INSULIN ASPART 6 UNITS: 100 INJECTION, SOLUTION INTRAVENOUS; SUBCUTANEOUS at 09:03

## 2024-03-04 RX ADMIN — LABETALOL HYDROCHLORIDE 200 MG: 200 TABLET, FILM COATED ORAL at 06:03

## 2024-03-04 NOTE — PROGRESS NOTES
Maternal Fetal Medicine Follow Up    Subjective     Patient ID: [id number]    Chief Complaint: mfm followup w/us      HPI: Tawnya Love is a 38 y.o. female  at 36w2d gestation with Estimated Date of Delivery: 3/30/24  who is here for follow  up consultation by M.    She has history of chronic hypertension.  She is currently not on antihypertensives. On 2023, 24 hour urine with <6.8 mg protein.  She has history of 3 pregnancy losses and had negative APS testing.   In 2014 pregnancy, she experienced cramping for a few hours and then delivered at 14 weeks gestation. She had ultrasound indicated cerclage in her last pregnancy and was on nightly vaginal progesterone.   She delivered at 36 weeks in that pregnancy due to preeclampsia.  She is status post prophylactic cerclage on 2023.  She has increased BMI of 34 at consult visit. She has history of gestational diabetes treated with metformin and insulin and also has history of PCOS.  She now has recurrent GDM and is on insulin, 18 units of NPH and 12 units of Humalog in the morning, 6 units of Humalog for supper, and 32 units of NPH at bedtime. She has corrective formula of 1 unit of Humalog for every 8 mg over 140.  She has upper normal fetal growth with abdominal circumference at the 90th percentile on most recent ultrasound. She has history of hypothyroidism and was previously on Synthroid in her last pregnancy.  She is not currently medication.  On 2023, TSH was normal at 1.256.  Follow-up TSH on 2024 was 0.908.  She is of advanced maternal age and will be 38 by the ELVIS.  She had negative cell free DNA. She had subchorionic hemorrhage consult ultrasound, not seen on follow-up ultrasound, and denied any vaginal bleeding.  She is is on low-dose aspirin daily.       Interval history since last [facility] visit: None.. She denies any leaking fluid, vaginal bleeding, contractions, decreased fetal movement. Denies headaches, visual disturbances,  or epigastric pain.    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    Medications:  Current Outpatient Medications   Medication Instructions    aspirin 81 mg Cap     aspirin 81 mg, Oral, 2 times daily    blood sugar diagnostic (ONETOUCH ULTRA TEST) Strp USE ONE STRIP FOUR TIMES DAILY TO CHECK BLOOD SUGARS.    blood sugar diagnostic Strp 1 each, Misc.(Non-Drug; Combo Route), 4 times daily    blood-glucose meter kit Use as instructed to check blood glucose    insulin aspart U-100 (NOVOLOG) 100 unit/mL injection Inject 6 units at supper. Also, use the corrective dosage of 1 unit for every 8 mg over 140. Max dose 40 units per day.    insulin NPH (NOVOLIN N NPH U-100 INSULIN) 100 unit/mL injection INJECT 12 units in the morning, and 32 units at bedtime.    insulin syringe-needle U-100 1 mL 31 gauge x 5/16 Syrg Use 1 syringe to skin three x's daily.    lancets 30 gauge Misc 1 lancet , Misc.(Non-Drug; Combo Route), 4 times daily    ONETOUCH DELICA PLUS LANCET 33 gauge Misc Topical (Top), 4 times daily    ONETOUCH ULTRA2  "METER Misc SMARTSIG:Via Meter As Directed    prenatal vit/iron fum/folic ac (PRENATAL 1+1 ORAL) Oral       Review of Systems   12 point review of systems conducted, negative except as stated in the history of present illness. See HPI for details.      Objective     Visit Vitals  BP (!) 180/120   Pulse 92   Ht 5' 3" (1.6 m)   Wt 95.9 kg (211 lb 6.4 oz)   BMI 37.45 kg/m²         Physical Exam  Vitals and nursing note reviewed.   Constitutional:       Appearance: Normal appearance.      Comments: Increased BMI   HENT:      Head: Normocephalic and atraumatic.      Nose: Nose normal. No congestion.   Cardiovascular:      Rate and Rhythm: Normal rate.   Pulmonary:      Effort: Pulmonary effort is normal.   Skin:     Findings: No rash.   Neurological:      Mental Status: She is alert and oriented to person, place, and time.   Psychiatric:         Mood and Affect: Mood normal.         Behavior: Behavior normal.         Thought Content: Thought content normal.         Judgment: Judgment normal.         ASSESSMENT/PLAN:     38 y.o.  female with IUP at 36w2d    Chronic hypertension in pregnancy  There is upper normal fetal growth with an EFW of 2574 g at the 40% and the AC at the 90% on 2024.  AFV is normal. BPP is 8/8.     Chronic hypertension complicates up to 2-5% of pregnancies and is associated with significant adverse pregnancy outcomes including  birth, fetal growth restriction, fetal demise, placental abruption, and  delivery.    BP Readings from Last 1 Encounters:   24 (!) 180/120      Patient is asymptomatic.  There is no lower extremity edema.  Reflexes are normal.  With this BP, and 2 additional readings of 150/99 and 180/120, she was advised to go to the hospital at this time for close monitoring with blood pressure monitoring (and initiation of blood pressure medication depending on blood pressures) and preeclampsia labs with 24 hour urine.  Discussed with Dr. Alfonso who discussed " with Dr. Emanuel.  We will plan for her to receive a course of steroids while in the hospital.  If there is any evidence of superimposed preeclampsia or continued severe range blood pressures that are uncontrolled with medication, recommend proceeding with delivery without waiting for the complete steroid course as risks of prematurity would be outweighed by risks of continued pregnancy at that time.     Low dose aspirin as discussed.  If evidence of superimposed preeclampsia, low-dose aspirin will be discontinued.      History of cervical insufficiency and cerclage, s/p prophylactic cerclage  TVUS on 10/16/2023 with normal closed cervix 3.4 cm with cerclage suture in place. There is no need for further vaginal ultrasounds at this time. PTL precautions given.    She is aware of risks associated with cerclage placement and that goal is to decrease risk of  labor and delivery. She is to continue limited activity/home rest with ambulation/exercising BLE for DVT prophylaxis, no tub baths, and pelvic rest. She was instructed to report immediately any abdominal cramping, vaginal bleeding or change in discharge.     Will plan for cerclage removal on Wednesday with primary OB, unless indicated earlier.  PTL precautions reviewed.        Advanced maternal age  Reviewed risks with AMA, including risks for PTL, FGR, anomalies not seen, aneuploidy and stillbirth at term. She previously declined amniocentesis . She is aware of need for  evaluation. She previously had cell free DNA done with primary OB, requested.    She was advised to do fetal kick counts till delivery in view of the higher risk of fetal demise in utero closer to term with advanced maternal age.     Fetal surveillance as above.      History of recurrent miscarriages  She previously had negative APS testing.  Expectant management.  PTL precautions given.  Fetal kick count instructions reviewed.        Increased BMI in pregnancy  Body mass index is  37.45 kg/m². With 3 lb gain in 1 week.  She will continue healthy and low caloric diet.  Excess weight gain would be associated with gestational hypertension, gestational diabetes and adverse  outcomes, including fetal demise in utero.    With risk factors associated with increased BMI,  will start fetal kick counts at 24 weeks and continue till delivery.    It is important to lose weight after the pregnancy is over, especially before a future pregnancy was discussed. Breastfeeding may be an important tool in reducing the postpartum weight retention. Fetal risks were discussed with short term risk of fetal/ obesity and long term risk of adolescent component of metabolic syndrome.      History of hypothyroidism  Risks associated with uncontrolled hypothyroidism, including potential for pregnancy loss, neurologic/cognitive deficit in the baby,  delivery, LBW,  delivery, and gestational hypertension/preeclampsia were previously discussed.    With normal TSH, advised patient to stay off of Synthroid at this time.  She was instructed to report any symptoms of cold/heat intolerance, heart palpitations, fatigue or constipation.       At high risk for preeclampsia with a history of severe preeclampsia requiring delivery at 36 weeks  With her increased risk for preeclampsia, she agreed to continue low-dose aspirin daily until delivery. Preeclampsia precautions reviewed.      Recurrent gestational diabetes mellitus  Risks associated with diabetes in pregnancy include higher risk for polyhydramnios, fetal macrosomia, lower Apgar scores and  metabolic complications (hypoglycemia, hyperbilirubinemia, hypocalcemia, erythema) and developing preeclampsia.     Continue 2200 calorie diabetic diet. It is recommended that she have 30 grams of carbohydrates with breakfast, and 60 grams with lunch and dinner. In addition, she should have three snacks in between meals with 15 grams of carbohydrates  and at bedtime with 30 grams of carbohydrates. The importance of avoiding any significant weight gain till the end of the pregnancy was reviewed.                  Log reviewed. Patient advised to adjust to 18 units of NPH and 12 units of Humalog in the morning, 6 units of Humalog for supper, and 32 units of NPH at bedtime. Continue rescue dose of Humalog for 1 unit for every 7 mg of sugar over 140.     The patient was instructed to check blood sugar four times per day and call me in a few days with her blood sugars to make further adjustments of dose of Insulin. The goal of treatment is to keep pre-prandial blood sugars below 90 and one hour postprandial below 140.     See fetal testing as above.  She was advised to continue fetal kick counts 3 times daily, with immediate reporting of decreased fetal movements (<10 movements/hr).     With good BS control and stable gestational diabetes, on insulin or metformin, recommend delivery at 39 weeks gestation.     The association of gestational diabetes (50%) with adult-onset type 2 diabetes mellitus was reviewed.  The importance of losing weight after the pregnancy was emphasized, as well as doing a 75 g sugar test after postpartum exam and to have annual checkups every 1-3 years for blood sugars to make sure she does not develop diabetes.      Follow up in about 1 week (around 3/11/2024) for MFM follow-up, BPP if undelivered.       Future Appointments   Date Time Provider Department Center   3/11/2024  1:30 PM Tobias Alfonso MD [facility]   3/11/2024  1:30 PM ROOM 3, [facility]   12/2/2024  2:20 PM Shawna Stewart MD [place] [id number]        Xiao Cooper PA-C     This note was created with the assistance of [employer] voice recognition software. There may be transcription errors as a result of using this technology, however minimal. Effort has been made to ensure accuracy of transcription, but any obvious errors or omissions should  be clarified with the author of the document.

## 2024-03-05 VITALS
WEIGHT: 211 LBS | SYSTOLIC BLOOD PRESSURE: 126 MMHG | DIASTOLIC BLOOD PRESSURE: 71 MMHG | OXYGEN SATURATION: 97 % | BODY MASS INDEX: 37.39 KG/M2 | HEART RATE: 84 BPM | HEIGHT: 63 IN | RESPIRATION RATE: 16 BRPM

## 2024-03-05 PROBLEM — O16.9 HYPERTENSION AFFECTING PREGNANCY: Status: ACTIVE | Noted: 2024-03-05

## 2024-03-05 LAB
POCT GLUCOSE: 133 MG/DL (ref 70–110)
POCT GLUCOSE: 144 MG/DL (ref 70–110)
POCT GLUCOSE: 90 MG/DL (ref 70–110)
PROT 24H UR-MCNC: 96.6 MG/24 H (ref 0–165)
PROT UR STRIP-MCNC: 6.9 MG/DL
TOTAL VOLUME  (OHS): 1400 ML

## 2024-03-05 PROCEDURE — 99214 OFFICE O/P EST MOD 30 MIN: CPT | Mod: GT,,, | Performed by: OBSTETRICS & GYNECOLOGY

## 2024-03-05 PROCEDURE — 84156 ASSAY OF PROTEIN URINE: CPT | Performed by: STUDENT IN AN ORGANIZED HEALTH CARE EDUCATION/TRAINING PROGRAM

## 2024-03-05 PROCEDURE — 96372 THER/PROPH/DIAG INJ SC/IM: CPT | Performed by: STUDENT IN AN ORGANIZED HEALTH CARE EDUCATION/TRAINING PROGRAM

## 2024-03-05 PROCEDURE — 63600175 PHARM REV CODE 636 W HCPCS: Performed by: OBSTETRICS & GYNECOLOGY

## 2024-03-05 PROCEDURE — 25000003 PHARM REV CODE 250: Performed by: OBSTETRICS & GYNECOLOGY

## 2024-03-05 PROCEDURE — 25000003 PHARM REV CODE 250: Performed by: STUDENT IN AN ORGANIZED HEALTH CARE EDUCATION/TRAINING PROGRAM

## 2024-03-05 PROCEDURE — 96372 THER/PROPH/DIAG INJ SC/IM: CPT | Performed by: OBSTETRICS & GYNECOLOGY

## 2024-03-05 PROCEDURE — 63600175 PHARM REV CODE 636 W HCPCS: Performed by: STUDENT IN AN ORGANIZED HEALTH CARE EDUCATION/TRAINING PROGRAM

## 2024-03-05 PROCEDURE — G0378 HOSPITAL OBSERVATION PER HR: HCPCS

## 2024-03-05 RX ORDER — INSULIN ASPART 100 [IU]/ML
12 INJECTION, SOLUTION INTRAVENOUS; SUBCUTANEOUS
Status: DISCONTINUED | OUTPATIENT
Start: 2024-03-05 | End: 2024-03-05 | Stop reason: HOSPADM

## 2024-03-05 RX ORDER — INSULIN ASPART 100 [IU]/ML
16 INJECTION, SOLUTION INTRAVENOUS; SUBCUTANEOUS
Status: DISCONTINUED | OUTPATIENT
Start: 2024-03-06 | End: 2024-03-05 | Stop reason: HOSPADM

## 2024-03-05 RX ADMIN — INSULIN ASPART 12 UNITS: 100 INJECTION, SOLUTION INTRAVENOUS; SUBCUTANEOUS at 06:03

## 2024-03-05 RX ADMIN — INSULIN HUMAN 18 UNITS: 100 INJECTION, SUSPENSION SUBCUTANEOUS at 07:03

## 2024-03-05 RX ADMIN — INSULIN ASPART 4 UNITS: 100 INJECTION, SOLUTION INTRAVENOUS; SUBCUTANEOUS at 09:03

## 2024-03-05 RX ADMIN — LABETALOL HYDROCHLORIDE 400 MG: 200 TABLET, FILM COATED ORAL at 05:03

## 2024-03-05 RX ADMIN — LABETALOL HYDROCHLORIDE 300 MG: 100 TABLET, FILM COATED ORAL at 05:03

## 2024-03-05 RX ADMIN — INSULIN ASPART 12 UNITS: 100 INJECTION, SOLUTION INTRAVENOUS; SUBCUTANEOUS at 07:03

## 2024-03-05 RX ADMIN — BETAMETHASONE SODIUM PHOSPHATE AND BETAMETHASONE ACETATE 12 MG: 3; 3 INJECTION, SUSPENSION INTRA-ARTICULAR; INTRALESIONAL; INTRAMUSCULAR at 04:03

## 2024-03-05 NOTE — CONSULTS
The Tawnya Love is a 38 y.o.  female  at 36w3d  gestation who was admitted to the hospital for chronic hypertension elevated blood pressure.  She was sent to the hospital from [facility] office on 3/4/2024 after noted to have multiple severe range elevations in blood pressure up to 180/120 with no symptoms.  She has history of chronic hypertension and was previously on no medication.  She was given IV medication and started on labetalol 400 mg q.12 hours.  On 2023, she had baseline 24 hour urine with <6.8 mg/dL protein.  She continued to have multiple severe range elevations on arrival to the hospital and was started on labetalol.  On 3/4/2024, she had preeclampsia labs that were reassuring with platelets 250, creatinine 0.68, uric acid 4.6, AST 18, ALT 24, .  24 hour urine is in progress.  She is s/p Celestone x1 on 3/4/2024 and is due to receive her 2nd dose this afternoon.  She denies leaking of fluid, vaginal bleeding, contractions, or decreased fetal movement. She denies headaches, visual disturbances, or epigastric pain.    She had ultrasound indicated cerclage in her last pregnancy and was on nightly vaginal progesterone. She delivered at 36 weeks in that pregnancy due to preeclampsia. She is status post prophylactic cerclage on 2023. She has increased BMI of 34 at consult visit. She has history of gestational diabetes treated with metformin and insulin and also has history of PCOS. She now has recurrent GDM and is on insulin, 18 units of NPH and 12 units of Humalog in the morning, 6 units of Humalog for supper, and 32 units of NPH at bedtime. She has corrective formula of 1 unit of Humalog for every 8 mg over 140. She is of advanced maternal age and will be 38 by the ELVIS. She had negative cell free DNA. She is is on low-dose aspirin daily.     Review of Systems   12 point review of systems conducted, negative except as stated in the history of present illness. See HPI for  details.    Past Medical History:   Diagnosis Date    Essential (primary) hypertension     Mixed hyperlipidemia     Personal history of gestational diabetes      labor in second trimester with  delivery in second trimester     Thyroid disease     Well adult exam         Past Surgical History:   Procedure Laterality Date    CERVICAL CERCLAGE N/A 2022    DILATION AND CURETTAGE OF UTERUS          Social Connections: Not on file        Family History   Problem Relation Age of Onset    No Known Problems Mother     Hypertension Father     Hyperlipidemia Father         Pulse:  [83-97] 84  Resp:  [16-17] 16  SpO2:  [93 %-99 %] 97 %  BP: (117-182)/() 126/71    Physical Exam  Vitals and nursing note reviewed.   Constitutional:       Appearance: Normal appearance.   HENT:      Head: Normocephalic.   Pulmonary:      Effort: Pulmonary effort is normal.   Abdominal:      Palpations: Abdomen is soft.      Comments: Gravid uterus, no tenderness   Musculoskeletal:      Cervical back: Neck supple.      Right lower leg: No edema.      Left lower leg: No edema.   Skin:     General: Skin is dry.   Neurological:      Mental Status: She is alert and oriented to person, place, and time.      Deep Tendon Reflexes: Reflexes normal.   Psychiatric:         Mood and Affect: Mood normal.         Thought Content: Thought content normal.         Judgment: Judgment normal.          Recent Results (from the past 48 hour(s))   SYPHILIS ANTIBODY (WITH REFLEX RPR)    Collection Time: 24  4:47 PM   Result Value Ref Range    Syphilis Antibody Nonreactive Nonreactive, Equivocal   Type & Screen    Collection Time: 24  4:47 PM   Result Value Ref Range    Group & Rh A POS     Indirect Sonia GEL NEG     Specimen Outdate 2024 23:59    Comprehensive Metabolic Panel    Collection Time: 24  4:48 PM   Result Value Ref Range    Sodium Level 138 136 - 145 mmol/L    Potassium Level 3.9 3.5 - 5.1 mmol/L    Chloride  108 (H) 98 - 107 mmol/L    Carbon Dioxide 21 (L) 22 - 29 mmol/L    Glucose Level 59 (L) 74 - 100 mg/dL    Blood Urea Nitrogen 7.5 7.0 - 18.7 mg/dL    Creatinine 0.68 0.55 - 1.02 mg/dL    Calcium Level Total 8.8 8.4 - 10.2 mg/dL    Protein Total 6.0 (L) 6.4 - 8.3 gm/dL    Albumin Level 2.5 (L) 3.5 - 5.0 g/dL    Globulin 3.5 2.4 - 3.5 gm/dL    Albumin/Globulin Ratio 0.7 (L) 1.1 - 2.0 ratio    Bilirubin Total 0.4 <=1.5 mg/dL    Alkaline Phosphatase 196 (H) 40 - 150 unit/L    Alanine Aminotransferase 24 0 - 55 unit/L    Aspartate Aminotransferase 18 5 - 34 unit/L    eGFR >60 mls/min/1.73/m2   Uric Acid    Collection Time: 03/04/24  4:48 PM   Result Value Ref Range    Uric Acid 4.6 2.6 - 6.0 mg/dL   Lactate Dehydrogenase    Collection Time: 03/04/24  4:48 PM   Result Value Ref Range    Lactate Dehydrogenase 168 125 - 220 U/L   CBC with Differential    Collection Time: 03/04/24  4:48 PM   Result Value Ref Range    WBC 5.43 4.50 - 11.50 x10(3)/mcL    RBC 3.82 (L) 4.20 - 5.40 x10(6)/mcL    Hgb 10.2 (L) 12.0 - 16.0 g/dL    Hct 31.5 (L) 37.0 - 47.0 %    MCV 82.5 80.0 - 94.0 fL    MCH 26.7 (L) 27.0 - 31.0 pg    MCHC 32.4 (L) 33.0 - 36.0 g/dL    RDW 16.4 11.5 - 17.0 %    Platelet 250 130 - 400 x10(3)/mcL    MPV 11.1 (H) 7.4 - 10.4 fL    Neut % 56.6 %    Lymph % 31.7 %    Mono % 9.2 %    Eos % 1.7 %    Basophil % 0.4 %    Lymph # 1.72 0.6 - 4.6 x10(3)/mcL    Neut # 3.08 2.1 - 9.2 x10(3)/mcL    Mono # 0.50 0.1 - 1.3 x10(3)/mcL    Eos # 0.09 0 - 0.9 x10(3)/mcL    Baso # 0.02 <=0.2 x10(3)/mcL    IG# 0.02 0 - 0.04 x10(3)/mcL    IG% 0.4 %    NRBC% 0.0 %   POCT glucose    Collection Time: 03/04/24  6:29 PM   Result Value Ref Range    POCT Glucose 90 70 - 110 mg/dL   POCT glucose    Collection Time: 03/04/24  9:01 PM   Result Value Ref Range    POCT Glucose 82 70 - 110 mg/dL   POCT glucose    Collection Time: 03/04/24 10:29 PM   Result Value Ref Range    POCT Glucose 177 (H) 70 - 110 mg/dL   POCT glucose    Collection Time: 03/05/24   5:57 AM   Result Value Ref Range    POCT Glucose 90 70 - 110 mg/dL        US OB Limited 1 Or More Gestations  Narrative: EXAMINATION:  US OB LIMITED 1 OR MORE GESTATIONS    CLINICAL HISTORY:  cervical length;    TECHNIQUE:  Limited OB ultrasound was performed to evaluate the cervical length.    COMPARISON:  None from this gestation    FINDINGS:  Closed cervix with average cervical length measures 3 cm.  Impression: Average cervical length of 3 cm.    Electronically signed by: Swati Palacio  Date:    2022  Time:    12:29      Plan    36w3d with:    Chronic hypertension with worsening blood pressure  Chronic hypertension complicates up to 2-5% of pregnancies and is associated with significant adverse pregnancy outcomes including  birth, fetal growth restriction, fetal demise, placental abruption, and  delivery.     Blood pressures reviewed and stable.  We will adjust dose of labetalol to 300 mg q.12 hours.  If blood pressures remained stable for the rest of the day, patient could be discharged home with close outpatient follow-up.  She will be seeing her primary OB later in the week and we will see her on Monday of next week.    With chronic hypertension with increasing blood pressures requiring higher dose of medication, GDM on insulin, and AMA, if everything is stable, recommend delivery at 38 weeks' gestation as optimal balance between risks of prematurity and risks of continued pregnancy would be achieved.  If blood pressure continues to increase to severe range with need for increasing dose of medication, then recommend delivery at 37 weeks' gestation if no evidence of superimposed preeclampsia.    If there is any evidence of superimposed preeclampsia at any time or continued severe range blood pressures that are uncontrolled with medication adjustments, recommend proceeding with delivery immediately as risks of prematurity would be outweighed by risks of continued pregnancy at that time.       Low dose aspirin as discussed.  If evidence of superimposed preeclampsia, low-dose aspirin will be discontinued.      GDM on insulin  Risks associated with diabetes in pregnancy include higher risk for polyhydramnios, fetal macrosomia, lower Apgar scores and  metabolic complications (hypoglycemia, hyperbilirubinemia, hypocalcemia, erythema) and developing preeclampsia.      Continue 2200 calorie diabetic diet.     CBGS reviewed.  With patient receiving course of  steroids, adjusted dose of insulin to 24 units of NPH and 16 units of Humalog in the morning, 12 units of Humalog for supper, and 36 units of NPH at bedtime. Continue rescue dose of Humalog for 1 unit for every 7 mg of sugar over 140.     Patient will resume her regular dose of insulin tomorrow night starting with bedtime NPH, once the effects of the steroid on the glucose have worn off , to reactivate tomorrow evening at bedtime (18 units of NPH and 12 units of Humalog in the morning, 6 units of Humalog for supper, and 32 units of NPH at bedtime).     The association of gestational diabetes (50%) with adult-onset type 2 diabetes mellitus was reviewed.  The importance of losing weight after the pregnancy was emphasized, as well as doing a 75 g sugar test after postpartum exam and to have annual checkups every 1-3 years for blood sugars to make sure she does not develop diabetes.      Advanced maternal age  Reviewed risks with AMA, including risks for PTL, FGR, anomalies not seen, aneuploidy and stillbirth at term. She previously declined amniocentesis . She is aware of need for  evaluation. She previously had cell free DNA done with primary OB, requested.     She was advised to do fetal kick counts till delivery in view of the higher risk of fetal demise in utero closer to term with advanced maternal age.      Fetal surveillance as above.        Increased BMI in pregnancy  She will continue healthy and low caloric diet.  Excess  weight gain would be associated with gestational hypertension, gestational diabetes and adverse  outcomes, including fetal demise in utero.     With risk factors associated with increased BMI,  will start fetal kick counts at 24 weeks and continue till delivery.     It is important to lose weight after the pregnancy is over, especially before a future pregnancy was discussed. Breastfeeding may be an important tool in reducing the postpartum weight retention. Fetal risks were discussed with short term risk of fetal/ obesity and long term risk of adolescent component of metabolic syndrome.      At high risk for preeclampsia  With her increased risk for preeclampsia, she agreed to continue low-dose aspirin daily until delivery. Preeclampsia precautions reviewed.      VTE prophylaxis  Discussed risks of thrombus with prolonged bedrest. Discussed risks/benefits of SCD use vs frequent ROM and exercise of BLE while in bed to decrease risk of DVT with or without Tiago stocking.  Agreed to use frequent movement of BLEs.  Advised to immediately report BLE discomfort, swelling, hotness tenderness. Expectant management    Risks, benefits, alternatives and possible complications have been discussed in detail with the patient.  Admission, and post admission procedures and expectations were discussed in detail.  All questions answered.        This note was created with the assistance of [employer] voice recognition software. There may be transcription errors as a result of using this technology, however minimal. Effort has been made to assure accuracy of transcription, but any obvious errors or omissions should be clarified with the author of the document.        Patient was evaluated and examined by Dr. Alfonso. MARIBEL Shah, helped as  scribe in completion of part of note.

## 2024-03-05 NOTE — DISCHARGE INSTRUCTIONS
Follow up with Dr. Emanuel in the office Thurs 3/7, and Dr. Alfonso office Monday 3/11.    Take medications as ordered, and continue to take blood pressures at home. If any recurrent headaches, blurred vision, swelling, or high blood pressures, please return to the hospital.  If you have cramping, leaking of fluid, vaginal bleeding, or decrease fetal movement return to the hospital.

## 2024-03-05 NOTE — H&P
[facility] - Antepartum  Obstetrics  History & Physical    Patient Name: Tawnya Austin  MRN: [id number]  Admission Date: 3/4/2024  Primary Care Provider: Shawna Stewart MD    Subjective:     Principal Problem:<principal problem not specified>    History of Present Illness: 38 WGA CHTN, GDM, cerclage admit for BP eval    OB History    Para Term  AB Living   5 1 0 1 3 1   SAB IAB Ectopic Multiple Live Births   3 0 0 0 1      # Outcome Date GA Lbr Uli/2nd Weight Sex Delivery Anes PTL Lv   5 Current            4  22 36w1d 03:21 / 00:11 2.183 kg (4 lb 13 oz) M Vag-Spont EPI N LOU      Complications: Preeclampsia      Name: NAT AUSTIN      Apgar1: 8  Apgar5: 9   3 2017 10w0d    SAB   FD   2 SAB 2015 6w0d       FD   1 SAB 14 14w0d    SAB   FD     Past Medical History:   Diagnosis Date    Essential (primary) hypertension     Mixed hyperlipidemia     Personal history of gestational diabetes      labor in second trimester with  delivery in second trimester     Thyroid disease     Well adult exam      Past Surgical History:   Procedure Laterality Date    CERVICAL CERCLAGE N/A 2022    DILATION AND CURETTAGE OF UTERUS         PTA Medications   Medication Sig    aspirin 81 mg Cap     insulin aspart U-100 (NOVOLOG) 100 unit/mL injection Inject 6 units at supper. Also, use the corrective dosage of 1 unit for every 8 mg over 140. Max dose 40 units per day.    insulin NPH (NOVOLIN N NPH U-100 INSULIN) 100 unit/mL injection INJECT 12 units in the morning, and 32 units at bedtime.    prenatal vit/iron fum/folic ac (PRENATAL 1+1 ORAL) Take by mouth.    aspirin 81 MG Chew Take 81 mg by mouth 2 (two) times a day.    blood sugar diagnostic (ONETOUCH ULTRA TEST) Strp USE ONE STRIP FOUR TIMES DAILY TO CHECK BLOOD SUGARS.    blood sugar diagnostic Strp 1 each by Misc.(Non-Drug; Combo Route) route 4 (four) times daily.    blood-glucose meter kit Use as  instructed to check blood glucose    insulin syringe-needle U-100 1 mL 31 gauge x 5/16 Syrg Use 1 syringe to skin three x's daily.    lancets 30 gauge Misc 1 lancet  by Misc.(Non-Drug; Combo Route) route 4 (four) times daily.    ONETOUCH DELICA PLUS LANCET 33 gauge Misc Apply topically 4 (four) times daily.    ONETOUCH ULTRA2 METER Misc SMARTSIG:Via Meter As Directed       Review of patient's allergies indicates:  No Known Allergies     Family History       Problem Relation (Age of Onset)    Hyperlipidemia Father    Hypertension Father    No Known Problems Mother          Tobacco Use    Smoking status: Never     Passive exposure: Never    Smokeless tobacco: Never   Substance and Sexual Activity    Alcohol use: Not Currently    Drug use: Never    Sexual activity: Not Currently     Partners: Male     Birth control/protection: None     Review of Systems   Objective:     Vital Signs (Most Recent):  Pulse: 89 (03/05/24 0645)  Resp: 16 (03/05/24 0445)  BP: 122/68 (03/05/24 0645)  SpO2: 97 % (03/05/24 0645) Vital Signs (24h Range):  Pulse:  [83-97] 89  Resp:  [16-17] 16  SpO2:  [93 %-99 %] 97 %  BP: (117-182)/() 122/68     Weight: 95.7 kg (211 lb)  Body mass index is 37.38 kg/m².        Physical Exam    Cervix:  Dilation:  defered     Significant Labs:  Lab Results   Component Value Date    GROUPTRH A POS 03/04/2024    HEPBSAG Negative 08/11/2022    STREPBCULT negative 08/11/2022     I have personallly reviewed all pertinent lab results from the last 24 hours.  I have personallly reviewed all pertinent lab results from the last 24 hours.  Recent Lab Results  (Last 5 results in the past 24 hours)        03/05/24  0557   03/04/24  2229   03/04/24  2101   03/04/24  1829   03/04/24  1648        Albumin/Globulin Ratio         0.7       Albumin         2.5       ALP         196       ALT         24       AST         18       Baso #         0.02       Basophil %         0.4       BILIRUBIN TOTAL         0.4       BUN          7.5       Calcium         8.8       Chloride         108       CO2         21       Creatinine         0.68       eGFR         >60       Eos #         0.09       Eos %         1.7       Globulin, Total         3.5       Glucose         59       Hematocrit         31.5       Hemoglobin         10.2       Immature Grans (Abs)         0.02       Immature Granulocytes         0.4       Lactate Dehydrogenase         168       Lymph #         1.72       LYMPH %         31.7       MCH         26.7       MCHC         32.4       MCV         82.5       Mono #         0.50       Mono %         9.2       MPV         11.1       Neut #         3.08       Neut %         56.6       nRBC         0.0       Platelet Count         250       POCT Glucose 90   177   82   90         Potassium         3.9       PROTEIN TOTAL         6.0       RBC         3.82       RDW         16.4       Sodium         138       Uric Acid         4.6       WBC         5.43                              Assessment/Plan:     38 y.o. female  at 36w3d for: BP eval    There are no hospital problems to display for this patient.      MFM following.  BMZ given.  Low threshold to induce if severe features    Robbie Emanuel MD  Obstetrics  [facility] - [facility]

## 2024-03-06 ENCOUNTER — PATIENT OUTREACH (OUTPATIENT)
Dept: ADMINISTRATIVE | Facility: CLINIC | Age: 39
End: 2024-03-06
Payer: COMMERCIAL

## 2024-03-06 DIAGNOSIS — O10.013 PRE-EXISTING ESSENTIAL HYPERTENSION AFFECTING PREGNANCY IN THIRD TRIMESTER: ICD-10-CM

## 2024-03-06 DIAGNOSIS — Z86.39 HISTORY OF HYPOTHYROIDISM: ICD-10-CM

## 2024-03-06 DIAGNOSIS — O09.90 AT HIGH RISK FOR COMPLICATIONS OF INTRAUTERINE PREGNANCY (IUP): Primary | ICD-10-CM

## 2024-03-10 ENCOUNTER — HOSPITAL ENCOUNTER (INPATIENT)
Facility: HOSPITAL | Age: 39
LOS: 2 days | Discharge: HOME | End: 2024-03-14
Attending: STUDENT IN AN ORGANIZED HEALTH CARE EDUCATION/TRAINING PROGRAM | Admitting: STUDENT IN AN ORGANIZED HEALTH CARE EDUCATION/TRAINING PROGRAM
Payer: COMMERCIAL

## 2024-03-10 ENCOUNTER — PATIENT MESSAGE (OUTPATIENT)
Dept: MATERNAL FETAL MEDICINE | Facility: CLINIC | Age: 39
End: 2024-03-10
Payer: COMMERCIAL

## 2024-03-10 DIAGNOSIS — O10.013 PRE-EXISTING ESSENTIAL HYPERTENSION AFFECTING PREGNANCY IN THIRD TRIMESTER: ICD-10-CM

## 2024-03-10 DIAGNOSIS — O16.9 HYPERTENSION AFFECTING PREGNANCY: ICD-10-CM

## 2024-03-10 DIAGNOSIS — O24.414 INSULIN CONTROLLED GESTATIONAL DIABETES MELLITUS (GDM) IN THIRD TRIMESTER: ICD-10-CM

## 2024-03-10 DIAGNOSIS — Z3A.37 PREGNANCY WITH 37 WEEKS COMPLETED GESTATION: Primary | ICD-10-CM

## 2024-03-10 DIAGNOSIS — O11.9 CHRONIC HYPERTENSION WITH SUPERIMPOSED PREECLAMPSIA: ICD-10-CM

## 2024-03-10 LAB
ALBUMIN SERPL-MCNC: 2.6 G/DL (ref 3.5–5)
ALBUMIN/GLOB SERPL: 0.7 RATIO (ref 1.1–2)
ALP SERPL-CCNC: 197 UNIT/L (ref 40–150)
ALT SERPL-CCNC: 31 UNIT/L (ref 0–55)
AST SERPL-CCNC: 20 UNIT/L (ref 5–34)
BASOPHILS # BLD AUTO: 0.01 X10(3)/MCL
BASOPHILS NFR BLD AUTO: 0.2 %
BILIRUB SERPL-MCNC: 0.3 MG/DL
BUN SERPL-MCNC: 10 MG/DL (ref 7–18.7)
CALCIUM SERPL-MCNC: 8.6 MG/DL (ref 8.4–10.2)
CHLORIDE SERPL-SCNC: 109 MMOL/L (ref 98–107)
CO2 SERPL-SCNC: 20 MMOL/L (ref 22–29)
CREAT SERPL-MCNC: 0.9 MG/DL (ref 0.55–1.02)
CREAT UR-MCNC: 225.5 MG/DL (ref 45–106)
EOSINOPHIL # BLD AUTO: 0.1 X10(3)/MCL (ref 0–0.9)
EOSINOPHIL NFR BLD AUTO: 1.8 %
ERYTHROCYTE [DISTWIDTH] IN BLOOD BY AUTOMATED COUNT: 17.2 % (ref 11.5–17)
GFR SERPLBLD CREATININE-BSD FMLA CKD-EPI: >60 MLS/MIN/1.73/M2
GLOBULIN SER-MCNC: 3.8 GM/DL (ref 2.4–3.5)
GLUCOSE SERPL-MCNC: 103 MG/DL (ref 70–110)
GLUCOSE SERPL-MCNC: 85 MG/DL (ref 74–100)
GROUP & RH: NORMAL
HCT VFR BLD AUTO: 31.8 % (ref 37–47)
HGB BLD-MCNC: 10.1 G/DL (ref 12–16)
IMM GRANULOCYTES # BLD AUTO: 0.02 X10(3)/MCL (ref 0–0.04)
IMM GRANULOCYTES NFR BLD AUTO: 0.4 %
INDIRECT COOMBS: NORMAL
LYMPHOCYTES # BLD AUTO: 2.02 X10(3)/MCL (ref 0.6–4.6)
LYMPHOCYTES NFR BLD AUTO: 36 %
MCH RBC QN AUTO: 27.3 PG (ref 27–31)
MCHC RBC AUTO-ENTMCNC: 31.8 G/DL (ref 33–36)
MCV RBC AUTO: 85.9 FL (ref 80–94)
MONOCYTES # BLD AUTO: 0.53 X10(3)/MCL (ref 0.1–1.3)
MONOCYTES NFR BLD AUTO: 9.4 %
NEUTROPHILS # BLD AUTO: 2.93 X10(3)/MCL (ref 2.1–9.2)
NEUTROPHILS NFR BLD AUTO: 52.2 %
NRBC BLD AUTO-RTO: 0.4 %
PLATELET # BLD AUTO: 274 X10(3)/MCL (ref 130–400)
PMV BLD AUTO: 11.2 FL (ref 7.4–10.4)
POTASSIUM SERPL-SCNC: 4.6 MMOL/L (ref 3.5–5.1)
PROT SERPL-MCNC: 6.4 GM/DL (ref 6.4–8.3)
PROT UR STRIP-MCNC: 14.1 MG/DL
RBC # BLD AUTO: 3.7 X10(6)/MCL (ref 4.2–5.4)
SODIUM SERPL-SCNC: 140 MMOL/L (ref 136–145)
SPECIMEN OUTDATE: NORMAL
T PALLIDUM AB SER QL: NONREACTIVE
URATE SERPL-MCNC: 6.1 MG/DL (ref 2.6–6)
URINE PROTEIN/CREATININE RATIO (OLG): 0.1
WBC # SPEC AUTO: 5.61 X10(3)/MCL (ref 4.5–11.5)

## 2024-03-10 PROCEDURE — G0378 HOSPITAL OBSERVATION PER HR: HCPCS

## 2024-03-10 PROCEDURE — 63600175 PHARM REV CODE 636 W HCPCS: Performed by: OBSTETRICS & GYNECOLOGY

## 2024-03-10 PROCEDURE — 86901 BLOOD TYPING SEROLOGIC RH(D): CPT | Performed by: OBSTETRICS & GYNECOLOGY

## 2024-03-10 PROCEDURE — 82570 ASSAY OF URINE CREATININE: CPT | Performed by: OBSTETRICS & GYNECOLOGY

## 2024-03-10 PROCEDURE — 99285 EMERGENCY DEPT VISIT HI MDM: CPT | Mod: 25

## 2024-03-10 PROCEDURE — 84550 ASSAY OF BLOOD/URIC ACID: CPT | Performed by: OBSTETRICS & GYNECOLOGY

## 2024-03-10 PROCEDURE — 86780 TREPONEMA PALLIDUM: CPT | Performed by: OBSTETRICS & GYNECOLOGY

## 2024-03-10 PROCEDURE — 80053 COMPREHEN METABOLIC PANEL: CPT | Performed by: OBSTETRICS & GYNECOLOGY

## 2024-03-10 PROCEDURE — 25000003 PHARM REV CODE 250: Performed by: OBSTETRICS & GYNECOLOGY

## 2024-03-10 PROCEDURE — 85025 COMPLETE CBC W/AUTO DIFF WBC: CPT | Performed by: OBSTETRICS & GYNECOLOGY

## 2024-03-10 PROCEDURE — 96372 THER/PROPH/DIAG INJ SC/IM: CPT | Performed by: OBSTETRICS & GYNECOLOGY

## 2024-03-10 RX ORDER — INSULIN ASPART 100 [IU]/ML
18 INJECTION, SOLUTION INTRAVENOUS; SUBCUTANEOUS
Status: DISCONTINUED | OUTPATIENT
Start: 2024-03-11 | End: 2024-03-12

## 2024-03-10 RX ORDER — INSULIN ASPART 100 [IU]/ML
6 INJECTION, SOLUTION INTRAVENOUS; SUBCUTANEOUS
Status: ON HOLD | COMMUNITY
End: 2024-03-14 | Stop reason: HOSPADM

## 2024-03-10 RX ORDER — INSULIN ASPART 100 [IU]/ML
6 INJECTION, SOLUTION INTRAVENOUS; SUBCUTANEOUS
Status: DISCONTINUED | OUTPATIENT
Start: 2024-03-11 | End: 2024-03-12

## 2024-03-10 RX ORDER — LABETALOL 300 MG/1
300 TABLET, FILM COATED ORAL 2 TIMES DAILY
Status: ON HOLD | COMMUNITY
End: 2024-03-14 | Stop reason: HOSPADM

## 2024-03-10 RX ORDER — INSULIN ASPART 100 [IU]/ML
18 INJECTION, SOLUTION INTRAVENOUS; SUBCUTANEOUS
Status: ON HOLD | COMMUNITY
End: 2024-03-14 | Stop reason: HOSPADM

## 2024-03-10 RX ADMIN — LABETALOL HYDROCHLORIDE 300 MG: 100 TABLET, FILM COATED ORAL at 09:03

## 2024-03-10 RX ADMIN — INSULIN HUMAN 32 UNITS: 100 INJECTION, SUSPENSION SUBCUTANEOUS at 10:03

## 2024-03-11 LAB
ALT SERPL-CCNC: 27 UNIT/L (ref 0–55)
AST SERPL-CCNC: 20 UNIT/L (ref 5–34)
CREAT SERPL-MCNC: 0.69 MG/DL (ref 0.55–1.02)
ERYTHROCYTE [DISTWIDTH] IN BLOOD BY AUTOMATED COUNT: 17.4 % (ref 11.5–17)
GFR SERPLBLD CREATININE-BSD FMLA CKD-EPI: >60 MLS/MIN/1.73/M2
GLUCOSE SERPL-MCNC: 59 MG/DL (ref 70–110)
GLUCOSE SERPL-MCNC: 78 MG/DL (ref 70–110)
HCT VFR BLD AUTO: 30.7 % (ref 37–47)
HGB BLD-MCNC: 9.3 G/DL (ref 12–16)
LDH SERPL-CCNC: 214 U/L (ref 125–220)
MCH RBC QN AUTO: 26.7 PG (ref 27–31)
MCHC RBC AUTO-ENTMCNC: 30.3 G/DL (ref 33–36)
MCV RBC AUTO: 88.2 FL (ref 80–94)
NRBC BLD AUTO-RTO: 0 %
PLATELET # BLD AUTO: 245 X10(3)/MCL (ref 130–400)
PMV BLD AUTO: 10.5 FL (ref 7.4–10.4)
POCT GLUCOSE: 103 MG/DL (ref 70–110)
POCT GLUCOSE: 109 MG/DL (ref 70–110)
POCT GLUCOSE: 43 MG/DL (ref 70–110)
POCT GLUCOSE: 59 MG/DL (ref 70–110)
POCT GLUCOSE: 62 MG/DL (ref 70–110)
POCT GLUCOSE: 76 MG/DL (ref 70–110)
POCT GLUCOSE: 76 MG/DL (ref 70–110)
POCT GLUCOSE: 78 MG/DL (ref 70–110)
RBC # BLD AUTO: 3.48 X10(6)/MCL (ref 4.2–5.4)
URATE SERPL-MCNC: 6.1 MG/DL (ref 2.6–6)
WBC # SPEC AUTO: 5.46 X10(3)/MCL (ref 4.5–11.5)

## 2024-03-11 PROCEDURE — 84460 ALANINE AMINO (ALT) (SGPT): CPT | Performed by: OBSTETRICS & GYNECOLOGY

## 2024-03-11 PROCEDURE — G0378 HOSPITAL OBSERVATION PER HR: HCPCS

## 2024-03-11 PROCEDURE — 25000003 PHARM REV CODE 250: Performed by: OBSTETRICS & GYNECOLOGY

## 2024-03-11 PROCEDURE — 85027 COMPLETE CBC AUTOMATED: CPT | Performed by: OBSTETRICS & GYNECOLOGY

## 2024-03-11 PROCEDURE — 63600175 PHARM REV CODE 636 W HCPCS: Performed by: OBSTETRICS & GYNECOLOGY

## 2024-03-11 PROCEDURE — 96372 THER/PROPH/DIAG INJ SC/IM: CPT | Performed by: OBSTETRICS & GYNECOLOGY

## 2024-03-11 PROCEDURE — 83615 LACTATE (LD) (LDH) ENZYME: CPT | Performed by: OBSTETRICS & GYNECOLOGY

## 2024-03-11 PROCEDURE — 99214 OFFICE O/P EST MOD 30 MIN: CPT | Mod: 25,,, | Performed by: OBSTETRICS & GYNECOLOGY

## 2024-03-11 PROCEDURE — 76819 FETAL BIOPHYS PROFIL W/O NST: CPT | Mod: 26,,, | Performed by: OBSTETRICS & GYNECOLOGY

## 2024-03-11 PROCEDURE — 82565 ASSAY OF CREATININE: CPT | Performed by: STUDENT IN AN ORGANIZED HEALTH CARE EDUCATION/TRAINING PROGRAM

## 2024-03-11 PROCEDURE — 84550 ASSAY OF BLOOD/URIC ACID: CPT | Performed by: OBSTETRICS & GYNECOLOGY

## 2024-03-11 PROCEDURE — 84450 TRANSFERASE (AST) (SGOT): CPT | Performed by: OBSTETRICS & GYNECOLOGY

## 2024-03-11 RX ADMIN — LABETALOL HYDROCHLORIDE 300 MG: 100 TABLET, FILM COATED ORAL at 10:03

## 2024-03-11 RX ADMIN — INSULIN HUMAN 12 UNITS: 100 INJECTION, SUSPENSION SUBCUTANEOUS at 07:03

## 2024-03-11 RX ADMIN — INSULIN ASPART 18 UNITS: 100 INJECTION, SOLUTION INTRAVENOUS; SUBCUTANEOUS at 07:03

## 2024-03-11 RX ADMIN — INSULIN HUMAN 32 UNITS: 100 INJECTION, SUSPENSION SUBCUTANEOUS at 09:03

## 2024-03-11 RX ADMIN — LABETALOL HYDROCHLORIDE 300 MG: 100 TABLET, FILM COATED ORAL at 09:03

## 2024-03-11 NOTE — PLAN OF CARE
Problem: Diabetes in Pregnancy  Goal: Blood Glucose Level Within Targeted Range  Outcome: Ongoing, Progressing     Problem:  Fall Injury Risk  Goal: Absence of Fall, Infant Drop and Related Injury  Outcome: Ongoing, Progressing     Problem: Adult Inpatient Plan of Care  Goal: Plan of Care Review  Outcome: Ongoing, Progressing  Goal: Patient-Specific Goal (Individualized)  Outcome: Ongoing, Progressing  Goal: Absence of Hospital-Acquired Illness or Injury  Outcome: Ongoing, Progressing  Goal: Optimal Comfort and Wellbeing  Outcome: Ongoing, Progressing  Goal: Readiness for Transition of Care  Outcome: Ongoing, Progressing

## 2024-03-11 NOTE — H&P
[facility] - Emergency Dept (OB)  Obstetrics  History & Physical    Patient Name: Tawnya Austin  MRN: [id number]  Admission Date: 3/10/2024  Primary Care Provider: Shawna Stewart MD    Subjective:     Principal Problem:Pre-existing essential hypertension affecting pregnancy in third trimester    History of Present Illness:  38 yr  @ 37 wk 1 d presenting to [facility] with elevatated BP's at home.  Preg complicated by CHTN and insulin managed GDM.  Pt recently had cerclage removed.  Pt states good FM, no LOF, no VB.  Pt states she did not feel well today and noted her BP's to be 170/100's at home.  Pt took extra dose of labetalol 300 mg and took a nap.  Pt states she had a HA which resolved, denies flashing lights or RUQ pain.    Obstetric HPI:  Patient reports None contractions, active fetal movement, No vaginal bleeding , No loss of fluid     This pregnancy has been complicated by AMA, CHTN, insulin controlled GDM, short cervix with recent cerclage removal.    OB History    Para Term  AB Living   5 1 0 1 3 1   SAB IAB Ectopic Multiple Live Births   3 0 0 0 1      # Outcome Date GA Lbr Uli/2nd Weight Sex Delivery Anes PTL Lv   5 Current            4  22 36w1d 03:21 / 00:11 2.183 kg (4 lb 13 oz) M Vag-Spont EPI N LOU      Complications: Preeclampsia      Name: NAT AUSTIN      Apgar1: 8  Apgar5: 9   3 2017 10w0d    SAB   FD   2 SAB 2015 6w0d       FD   1 SAB 14 14w0d    SAB   FD     Past Medical History:   Diagnosis Date    Essential (primary) hypertension     Mixed hyperlipidemia     Personal history of gestational diabetes      labor in second trimester with  delivery in second trimester     Thyroid disease     Well adult exam      Past Surgical History:   Procedure Laterality Date    CERVICAL CERCLAGE N/A 2022    DILATION AND CURETTAGE OF UTERUS         (Not in a hospital admission)      Review of patient's allergies  indicates:  No Known Allergies     Family History       Problem Relation (Age of Onset)    Hyperlipidemia Father    Hypertension Father    No Known Problems Mother          Tobacco Use    Smoking status: Never     Passive exposure: Never    Smokeless tobacco: Never   Substance and Sexual Activity    Alcohol use: Not Currently    Drug use: Never    Sexual activity: Not Currently     Partners: Male     Birth control/protection: None     Review of Systems   Constitutional: Negative.    Respiratory: Negative.     Cardiovascular: Negative.    Gastrointestinal: Negative.    Endocrine: Negative.    Genitourinary: Negative.    Musculoskeletal: Negative.    Neurological: Negative.    Hematological: Negative.    Psychiatric/Behavioral: Negative.     Breast: negative.       Objective:     Vital Signs (Most Recent):  Temp: 97.8 °F (36.6 °C) (03/10/24 1903)  Pulse: 91 (03/10/24 1944)  Resp: 18 (03/10/24 1903)  BP: 131/81 (03/10/24 1936)  SpO2: 98 % (03/10/24 1944) Vital Signs (24h Range):  Temp:  [97.8 °F (36.6 °C)] 97.8 °F (36.6 °C)  Pulse:  [80-92] 91  Resp:  [18] 18  SpO2:  [98 %-99 %] 98 %  BP: (131-155)/() 131/81        There is no height or weight on file to calculate BMI.    FHT: 135, reactive Cat 1 (reassuring)  TOCO:   irreg     Physical Exam     Cervix:  Dilation:  1  Effacement:  50%  Station: -1  Presentation: Vertex     Significant Labs:  Lab Results   Component Value Date    GROUPTRH A POS 2024    HEPBSAG Negative 2022    STREPBCULT negative 2022       I have personallly reviewed all pertinent lab results from the last 24 hours.  Assessment/Plan:     38 y.o. female  at 37w1d for:    * Pre-existing essential hypertension affecting pregnancy in third trimester  Continue oral labetalol 300 mg bid as previously prescribed.  D/w. C. Adelfo and pt will be observed overnight for BP fluctuations, and re-eval'd by primary OB in morning    Pregnancy with 37 weeks completed gestation  Continue  oral labetalol 300 mg bid as previously prescribed.  D/w. C. Adelfo and pt will be observed overnight for BP fluctuations, and re-eval'd by primary OB in morning    At high risk for complications of intrauterine pregnancy (IUP)  Continue oral labetalol 300 mg bid as previously prescribed.  D/w. C. Adelfo and pt will be observed overnight for BP fluctuations, and re-eval'd by primary OB in morning    History of insulin controlled gestational diabetes mellitus (GDM)  Pt to be fed so evening insulin dose can be administered.    AMA (advanced maternal age) multigravida 35+, third trimester  Continue oral labetalol 300 mg bid as previously prescribed.  D/w. C. Adelfo and pt will be observed overnight for BP fluctuations, and re-eval'd by primary OB in morning        Darell Cazares MD  Obstetrics  [facility] - Emergency Dept (OB)

## 2024-03-11 NOTE — CONSULTS
The Tawnya Love is a 38 y.o.  female  at 37w2d  gestation who was admitted to the hospital for elevated blood pressure.  She is known patient to [facility].  She has chronic hypertension and is currently on labetalol 300 mg q.12 hours.  Patient presented to the hospital on 3/10/2024 with report of elevated blood pressure up to 170 systolic at home and headache.  She reports she took an extra tablet of 300 mg labetalol.  She reports headache resolved spontaneously.  On presentation, she had some elevations in blood pressure up to 150 systolic with improvement in blood pressure overall.  On 2023, she had baseline 24 hour urine with <6.8 mg/dL protein.  24 hour urine on 3/5/24 showed 96.6 mg protein. On 3/10/2024, she had preeclampsia labs  with platelets 274, mildly elevated creatinine at 0.90, uric acid 6.1, AST 18, ALT 24.  Repeat creatinine level this afternoon was improved at 0.69.  She is s/p Celestone x1 on 3/4/2024 and 3/5/2024.   She denies leaking of fluid, vaginal bleeding, contractions, or decreased fetal movement. She denies current headaches, visual disturbances, or epigastric pain.    She had ultrasound indicated cerclage in her last pregnancy and was on nightly vaginal progesterone. She delivered at 36 weeks in that pregnancy due to preeclampsia. She is status post prophylactic cerclage on 2023 which was removed last week. She has increased BMI of 34 at consult visit. She has history of gestational diabetes treated with metformin and insulin and also has history of PCOS. She now has recurrent GDM and is on insulin, 18 units of NPH and 12 units of Humalog in the morning, 6 units of Humalog for supper, and 32 units of NPH at bedtime. She has corrective formula of 1 unit of Humalog for every 8 mg over 140. She is of advanced maternal age and will be 38 by the ELVIS. She had negative cell free DNA. She is is on low-dose aspirin daily.      I examined the patient this morning around 8:00 a.m..   She was not having any headaches at the time.    Review of Systems   12 point review of systems conducted, negative except as stated in the history of present illness. See HPI for details.    Past Medical History:   Diagnosis Date    Essential (primary) hypertension     Mixed hyperlipidemia     Personal history of gestational diabetes      labor in second trimester with  delivery in second trimester     Thyroid disease     Well adult exam         Past Surgical History:   Procedure Laterality Date    CERVICAL CERCLAGE N/A 2022    DILATION AND CURETTAGE OF UTERUS          Social Connections: Not on file        Family History   Problem Relation Age of Onset    No Known Problems Mother     Hypertension Father     Hyperlipidemia Father         Temp:  [97.8 °F (36.6 °C)-98.3 °F (36.8 °C)] 98.2 °F (36.8 °C)  Pulse:  [] 77  Resp:  [16-18] 16  SpO2:  [96 %-100 %] 97 %  BP: (128-156)/() 133/78    Physical Exam  Vitals and nursing note reviewed.   Constitutional:       Appearance: Normal appearance.   HENT:      Head: Normocephalic.   Pulmonary:      Effort: Pulmonary effort is normal.   Abdominal:      Palpations: Abdomen is soft.      Comments: Gravid uterus, no uterine tenderness   Musculoskeletal:      Cervical back: Neck supple.      Right lower leg: No edema.      Left lower leg: No edema.   Skin:     General: Skin is dry.   Neurological:      Mental Status: She is alert and oriented to person, place, and time.      Deep Tendon Reflexes: Reflexes normal.   Psychiatric:         Mood and Affect: Mood normal.         Thought Content: Thought content normal.         Judgment: Judgment normal.          Recent Results (from the past 48 hour(s))   Comprehensive metabolic panel    Collection Time: 03/10/24  7:52 PM   Result Value Ref Range    Sodium Level 140 136 - 145 mmol/L    Potassium Level 4.6 3.5 - 5.1 mmol/L    Chloride 109 (H) 98 - 107 mmol/L    Carbon Dioxide 20 (L) 22 - 29 mmol/L     Glucose Level 85 74 - 100 mg/dL    Blood Urea Nitrogen 10.0 7.0 - 18.7 mg/dL    Creatinine 0.90 0.55 - 1.02 mg/dL    Calcium Level Total 8.6 8.4 - 10.2 mg/dL    Protein Total 6.4 6.4 - 8.3 gm/dL    Albumin Level 2.6 (L) 3.5 - 5.0 g/dL    Globulin 3.8 (H) 2.4 - 3.5 gm/dL    Albumin/Globulin Ratio 0.7 (L) 1.1 - 2.0 ratio    Bilirubin Total 0.3 <=1.5 mg/dL    Alkaline Phosphatase 197 (H) 40 - 150 unit/L    Alanine Aminotransferase 31 0 - 55 unit/L    Aspartate Aminotransferase 20 5 - 34 unit/L    eGFR >60 mls/min/1.73/m2   Protein/Creatinine Ratio, Urine    Collection Time: 03/10/24  7:52 PM   Result Value Ref Range    Urine Protein Level 14.1 mg/dL    Urine Creatinine 225.5 (H) 45.0 - 106.0 mg/dL    Urine Protein/Creatinine Ratio 0.1    Uric acid    Collection Time: 03/10/24  7:52 PM   Result Value Ref Range    Uric Acid 6.1 (H) 2.6 - 6.0 mg/dL   CBC with Differential    Collection Time: 03/10/24  7:52 PM   Result Value Ref Range    WBC 5.61 4.50 - 11.50 x10(3)/mcL    RBC 3.70 (L) 4.20 - 5.40 x10(6)/mcL    Hgb 10.1 (L) 12.0 - 16.0 g/dL    Hct 31.8 (L) 37.0 - 47.0 %    MCV 85.9 80.0 - 94.0 fL    MCH 27.3 27.0 - 31.0 pg    MCHC 31.8 (L) 33.0 - 36.0 g/dL    RDW 17.2 (H) 11.5 - 17.0 %    Platelet 274 130 - 400 x10(3)/mcL    MPV 11.2 (H) 7.4 - 10.4 fL    Neut % 52.2 %    Lymph % 36.0 %    Mono % 9.4 %    Eos % 1.8 %    Basophil % 0.2 %    Lymph # 2.02 0.6 - 4.6 x10(3)/mcL    Neut # 2.93 2.1 - 9.2 x10(3)/mcL    Mono # 0.53 0.1 - 1.3 x10(3)/mcL    Eos # 0.10 0 - 0.9 x10(3)/mcL    Baso # 0.01 <=0.2 x10(3)/mcL    IG# 0.02 0 - 0.04 x10(3)/mcL    IG% 0.4 %    NRBC% 0.4 %   Type & Screen    Collection Time: 03/10/24  8:34 PM   Result Value Ref Range    Group & Rh A POS     Indirect Sonia GEL NEG     Specimen Outdate 03/13/2024 23:59    SYPHILIS ANTIBODY (WITH REFLEX RPR)    Collection Time: 03/10/24  8:34 PM   Result Value Ref Range    Syphilis Antibody Nonreactive Nonreactive, Equivocal   POCT Glucose, Hand-Held Device     Collection Time: 03/10/24 10:41 PM   Result Value Ref Range    POC Glucose 103 (A) 70 - 110 MG/DL   POCT glucose    Collection Time: 03/10/24 10:41 PM   Result Value Ref Range    POCT Glucose 103 70 - 110 mg/dL   POCT Glucose, Hand-Held Device    Collection Time: 03/11/24  6:15 AM   Result Value Ref Range    POC Glucose 59 (A) 70 - 110 MG/DL   POCT glucose    Collection Time: 03/11/24  6:15 AM   Result Value Ref Range    POCT Glucose 59 (L) 70 - 110 mg/dL   POCT glucose    Collection Time: 03/11/24  8:01 AM   Result Value Ref Range    POCT Glucose 76 70 - 110 mg/dL   AST (SGOT)    Collection Time: 03/11/24  8:29 AM   Result Value Ref Range    Aspartate Aminotransferase 20 5 - 34 unit/L   ALT (SGPT)    Collection Time: 03/11/24  8:29 AM   Result Value Ref Range    Alanine Aminotransferase 27 0 - 55 unit/L   Lactate Dehydrogenase    Collection Time: 03/11/24  8:29 AM   Result Value Ref Range    Lactate Dehydrogenase 214 125 - 220 U/L   Uric Acid    Collection Time: 03/11/24  8:29 AM   Result Value Ref Range    Uric Acid 6.1 (H) 2.6 - 6.0 mg/dL   CBC Without Differential    Collection Time: 03/11/24  8:29 AM   Result Value Ref Range    WBC 5.46 4.50 - 11.50 x10(3)/mcL    RBC 3.48 (L) 4.20 - 5.40 x10(6)/mcL    Hgb 9.3 (L) 12.0 - 16.0 g/dL    Hct 30.7 (L) 37.0 - 47.0 %    MCV 88.2 80.0 - 94.0 fL    MCH 26.7 (L) 27.0 - 31.0 pg    MCHC 30.3 (L) 33.0 - 36.0 g/dL    RDW 17.4 (H) 11.5 - 17.0 %    Platelet 245 130 - 400 x10(3)/mcL    MPV 10.5 (H) 7.4 - 10.4 fL    NRBC% 0.0 %   Creatinine, Serum    Collection Time: 03/11/24  8:29 AM   Result Value Ref Range    Creatinine 0.69 0.55 - 1.02 mg/dL    eGFR >60 mls/min/1.73/m2        US OB Non Rad 14+ Weeks TransAbd, w/Biophysical Profile, w/o NST Single Gestation (XPD)  See Provider Notes for results.     IMPRESSION: Please see provider notes for interpretation    This procedure was auto-finalized by: Virtual Radiologist      Plan    37w2d with:    Chronic hypertension with elevated  blood pressures during the night, currently stable, with no evidence of superimposed preeclampsia  There is upper normal fetal growth with an EFW of 2574 g at the 40% and the AC at the 90% on 2024.  AFV is normal. BPP is 8/8.      Chronic hypertension complicates up to 2-5% of pregnancies and is associated with significant adverse pregnancy outcomes including  birth, fetal growth restriction, fetal demise, placental abruption, and  delivery.     BPP this morning was 8/8.    On 3/10/2024, she had preeclampsia labs  with platelets 274, mildly elevated creatinine at 0.90, uric acid 6.1, AST 18, ALT 24.  Repeat creatinine level this afternoon was improved at 0.69.  Repeat rest of preeclampsia labs were also normal.    She is asymptomatic.  She has normal reflexes and no edema. Blood pressures reviewed and overall stable.  With labile blood pressures during the night I adjusted the dose of labetalol to 300 mg q.8 hours.        Low dose aspirin as discussed.  If evidence of superimposed preeclampsia, low-dose aspirin will be discontinued.    With stable blood pressures and improved creatinine level, patient could be discharged home with close outpatient follow-up and delivery at 38 weeks.  Sooner delivery maybe needed for worsening maternal or fetal status or evidence of superimposed preeclampsia.  Patient will keep her follow-up in a few days with Dr. Emanuel.  Patient will continue checking her blood pressures at home and come back if the systolic blood pressure goes over 155 or diastolic over 105 or if she has any significant headaches vision problems or epigastric pain.        History of cervical insufficiency and cerclage, s/p prophylactic cerclage  She had cerclage removed last week.  Labor precautions reviewed.         Advanced maternal age  Reviewed risks with AMA, including risks for PTL, FGR, anomalies not seen, aneuploidy and stillbirth at term. She previously declined amniocentesis . She is  aware of need for  evaluation. She previously had cell free DNA done with primary OB, requested.     She was advised to do fetal kick counts till delivery in view of the higher risk of fetal demise in utero closer to term with advanced maternal age.          History of recurrent miscarriages  She previously had negative APS testing.  Expectant management.  PTL precautions given.  Fetal kick count instructions reviewed.          Increased BMI in pregnancy  She will continue healthy and low caloric diet.  Excess weight gain would be associated with gestational hypertension, gestational diabetes and adverse  outcomes, including fetal demise in utero.      It is important to lose weight after the pregnancy is over, especially before a future pregnancy was discussed. Breastfeeding may be an important tool in reducing the postpartum weight retention. Fetal risks were discussed with short term risk of fetal/ obesity and long term risk of adolescent component of metabolic syndrome.        At high risk for preeclampsia with a history of severe preeclampsia requiring delivery at 36 weeks  With her increased risk for preeclampsia, she agreed to continue low-dose aspirin daily until delivery. Preeclampsia precautions reviewed.        Recurrent gestational diabetes mellitus  Risks associated with diabetes in pregnancy include higher risk for polyhydramnios, fetal macrosomia, lower Apgar scores and  metabolic complications (hypoglycemia, hyperbilirubinemia, hypocalcemia, erythema) and developing preeclampsia.      Continue 2200 calorie diabetic diet.                CBGs reviewed. Continue 18 units of NPH and 12 units of Humalog in the morning, 6 units of Humalog for supper, and 32 units of NPH at bedtime. Continue rescue dose of Humalog for 1 unit for every 7 mg of sugar over 140.     Continue CBG 4 times a day.       She was advised to continue fetal kick counts 3 times daily, with immediate  reporting of decreased fetal movements (<10 movements/hr).      The association of gestational diabetes (50%) with adult-onset type 2 diabetes mellitus was reviewed.  The importance of losing weight after the pregnancy was emphasized, as well as doing a 75 g sugar test after postpartum exam and to have annual checkups every 1-3 years for blood sugars to make sure she does not develop diabetes.        VTE prophylaxis  Discussed risks of thrombus with prolonged bedrest. Discussed risks/benefits of SCD use vs frequent ROM and exercise of BLE while in bed to decrease risk of DVT with or without Tiago stocking.  Agreed to use frequent movement of BLEs.  Advised to immediately report BLE discomfort, swelling, hotness tenderness. Expectant management    Risks, benefits, alternatives and possible complications have been discussed in detail with the patient.  Admission, and post admission procedures and expectations were discussed in detail.  All questions answered.      This note was created with the assistance of [employer] voice recognition software. There may be transcription errors as a result of using this technology, however minimal. Effort has been made to assure accuracy of transcription, but any obvious errors or omissions should be clarified with the author of the document.      Patient was evaluated and examined by Dr. Alfonso. MARIBEL Shah, helped as  scribe in completion of part of note.

## 2024-03-11 NOTE — SUBJECTIVE & OBJECTIVE
Obstetric HPI:  Patient reports None contractions, active fetal movement, No vaginal bleeding , No loss of fluid     This pregnancy has been complicated by AMA, CHTN, insulin controlled GDM, short cervix with recent cerclage removal.    OB History    Para Term  AB Living   5 1 0 1 3 1   SAB IAB Ectopic Multiple Live Births   3 0 0 0 1      # Outcome Date GA Lbr Uli/2nd Weight Sex Delivery Anes PTL Lv   5 Current            4  22 36w1d 03:21 / 00:11 2.183 kg (4 lb 13 oz) M Vag-Spont EPI N LOU      Complications: Preeclampsia      Name: NAT AUSTIN      Apgar1: 8  Apgar5: 9   3 2017 10w0d    SAB   FD   2 2015 6w0d       FD   1 14 14w0d    SAB   FD     Past Medical History:   Diagnosis Date    Essential (primary) hypertension     Mixed hyperlipidemia     Personal history of gestational diabetes      labor in second trimester with  delivery in second trimester     Thyroid disease     Well adult exam      Past Surgical History:   Procedure Laterality Date    CERVICAL CERCLAGE N/A 2022    DILATION AND CURETTAGE OF UTERUS         (Not in a hospital admission)      Review of patient's allergies indicates:  No Known Allergies     Family History       Problem Relation (Age of Onset)    Hyperlipidemia Father    Hypertension Father    No Known Problems Mother          Tobacco Use    Smoking status: Never     Passive exposure: Never    Smokeless tobacco: Never   Substance and Sexual Activity    Alcohol use: Not Currently    Drug use: Never    Sexual activity: Not Currently     Partners: Male     Birth control/protection: None     Review of Systems   Constitutional: Negative.    Respiratory: Negative.     Cardiovascular: Negative.    Gastrointestinal: Negative.    Endocrine: Negative.    Genitourinary: Negative.    Musculoskeletal: Negative.    Neurological: Negative.    Hematological: Negative.    Psychiatric/Behavioral: Negative.     Breast: negative.        Objective:     Vital Signs (Most Recent):  Temp: 97.8 °F (36.6 °C) (03/10/24 1903)  Pulse: 91 (03/10/24 1944)  Resp: 18 (03/10/24 1903)  BP: 131/81 (03/10/24 1936)  SpO2: 98 % (03/10/24 1944) Vital Signs (24h Range):  Temp:  [97.8 °F (36.6 °C)] 97.8 °F (36.6 °C)  Pulse:  [80-92] 91  Resp:  [18] 18  SpO2:  [98 %-99 %] 98 %  BP: (131-155)/() 131/81        There is no height or weight on file to calculate BMI.    FHT: 135, reactive Cat 1 (reassuring)  TOCO:   irreg     Physical Exam     Cervix:  Dilation:  1  Effacement:  50%  Station: -1  Presentation: Vertex     Significant Labs:  Lab Results   Component Value Date    GROUPTRH A POS 03/04/2024    HEPBSAG Negative 08/11/2022    STREPBCULT negative 08/11/2022       I have personallly reviewed all pertinent lab results from the last 24 hours.

## 2024-03-11 NOTE — HPI
38 yr  @ 37 wk 1 d presenting to [facility] with elevatated BP's at home.  Preg complicated by CHTN and insulin managed GDM.  Pt recently had cerclage removed.  Pt states good FM, no LOF, no VB.  Pt states she did not feel well today and noted her BP's to be 170/100's at home.  Pt took extra dose of labetalol 300 mg and took a nap.  Pt states she had a HA which resolved, denies flashing lights or RUQ pain.

## 2024-03-11 NOTE — H&P
[facility]  Obstetrics  History & Physical    Patient Name: Tawnya Austin  MRN: [id number]  Admission Date: 3/10/2024  Primary Care Provider: Shawna Stewart MD    Subjective:     Principal Problem:Pre-existing essential hypertension affecting pregnancy in third trimester    History of Present Illness: 37.2 WGA for BP eval.  Severe at home, have stabilized since admission    OB History    Para Term  AB Living   5 1 0 1 3 1   SAB IAB Ectopic Multiple Live Births   3 0 0 0 1      # Outcome Date GA Lbr Lui/2nd Weight Sex Delivery Anes PTL Lv   5 Current            4  22 36w1d 03:21 / 00:11 2.183 kg (4 lb 13 oz) M Vag-Spont EPI N LOU      Complications: Preeclampsia      Name: NAT AUSTIN      Apgar1: 8  Apgar5: 9   3 2017 10w0d    SAB   FD   2 SAB 2015 6w0d       FD   1 SAB 14 14w0d    SAB   FD     Past Medical History:   Diagnosis Date    Essential (primary) hypertension     Mixed hyperlipidemia     Personal history of gestational diabetes      labor in second trimester with  delivery in second trimester     Thyroid disease     Well adult exam      Past Surgical History:   Procedure Laterality Date    CERVICAL CERCLAGE N/A 2022    DILATION AND CURETTAGE OF UTERUS         PTA Medications   Medication Sig    aspirin 81 MG Chew Take 81 mg by mouth 2 (two) times a day.    blood sugar diagnostic Strp 1 each by Misc.(Non-Drug; Combo Route) route 4 (four) times daily.    blood-glucose meter kit Use as instructed to check blood glucose    insulin aspart U-100 (NOVOLOG) 100 unit/mL injection Inject 6 Units into the skin before dinner.    insulin  unit/mL injection Inject 12 Units into the skin before breakfast.    insulin syringe-needle U-100 1 mL 31 gauge x 5/16 Syrg Use 1 syringe to skin three x's daily.    labetaloL (NORMODYNE) 300 MG tablet Take 300 mg by mouth 2 (two) times daily.    lancets 30 gauge Misc 1 lancet  by  Misc.(Non-Drug; Combo Route) route 4 (four) times daily.    prenatal vit/iron fum/folic ac (PRENATAL 1+1 ORAL) Take by mouth.    blood sugar diagnostic (ONETOUCH ULTRA TEST) Strp USE ONE STRIP FOUR TIMES DAILY TO CHECK BLOOD SUGARS.    insulin aspart U-100 (NOVOLOG) 100 unit/mL injection Inject 18 Units into the skin before breakfast.    insulin  unit/mL injection Inject 32 Units into the skin nightly.    ONETOUCH DELICA PLUS LANCET 33 gauge Misc Apply topically 4 (four) times daily.    ONETOUCH ULTRA2 METER Misc SMARTSIG:Via Meter As Directed       Review of patient's allergies indicates:  No Known Allergies     Family History       Problem Relation (Age of Onset)    Hyperlipidemia Father    Hypertension Father    No Known Problems Mother          Tobacco Use    Smoking status: Never     Passive exposure: Never    Smokeless tobacco: Never   Substance and Sexual Activity    Alcohol use: Not Currently    Drug use: Never    Sexual activity: Not Currently     Partners: Male     Birth control/protection: None     Review of Systems   Objective:     Vital Signs (Most Recent):  Temp: 98.2 °F (36.8 °C) (24 07)  Pulse: 88 (24 09)  Resp: 17 (24)  BP: (!) 140/84 (24 0908)  SpO2: 98 % (24 0828) Vital Signs (24h Range):  Temp:  [97.8 °F (36.6 °C)-98.3 °F (36.8 °C)] 98.2 °F (36.8 °C)  Pulse:  [] 88  Resp:  [17-18] 17  SpO2:  [96 %-100 %] 98 %  BP: (128-156)/() 140/84     Weight: 95.3 kg (210 lb)  Body mass index is 37.2 kg/m².      Physical Exam    Cervix:  Dilation:  1     Significant Labs:  Lab Results   Component Value Date    GROUPTRH A POS 03/10/2024    HEPBSAG Negative 2022    STREPBCULT negative 2022       I have personallly reviewed all pertinent lab results from the last 24 hours.    Assessment/Plan:     38 y.o. female  at 37w2d for:BP eval.  MFM following.  Low threshold to start IOL.  Will FU recs    Active Diagnoses:    Diagnosis Date Noted  POA    PRINCIPAL PROBLEM:  Pre-existing essential hypertension affecting pregnancy in third trimester [O10.013] 09/18/2023 Yes    Pregnancy with 37 weeks completed gestation [Z3A.37] 03/10/2024 Not Applicable    History of insulin controlled gestational diabetes mellitus (GDM) [Z86.32] 09/18/2023 Yes    At high risk for complications of intrauterine pregnancy (IUP) [O09.90] 09/18/2023 Not Applicable    AMA (advanced maternal age) multigravida 35+, third trimester [O09.523] 09/18/2023 Yes      Problems Resolved During this Admission:           Robbie Emanuel MD  Obstetrics  [facility]

## 2024-03-11 NOTE — PROCEDURES
BIOPHYSICAL PROFILE  REPORT    DATE OF ULTRASOUND: 2024     INDICATION: Chronic hypertension     Gestational Age: 37w2d     Movement: 2/2  Tone:  2/2  Breathin/2  Fluid:  2/2     FHR:  125 bpm during ultrasound    GINA:  13.2    Presentation: Cephalic        Impression:  Biophysical profile          Tobias Alfonso MD       Components of this note were documented using voice recognition systems; and are subject to errors not corrected at proof reading. Please contact author for any clarifications.

## 2024-03-11 NOTE — ASSESSMENT & PLAN NOTE
Continue oral labetalol 300 mg bid as previously prescribed.  D/w. C. Adelfo and pt will be observed overnight for BP fluctuations, and re-eval'd by primary OB in morning

## 2024-03-12 ENCOUNTER — ANESTHESIA EVENT (OUTPATIENT)
Dept: OBSTETRICS AND GYNECOLOGY | Facility: HOSPITAL | Age: 39
End: 2024-03-12
Payer: COMMERCIAL

## 2024-03-12 ENCOUNTER — ANESTHESIA (OUTPATIENT)
Dept: OBSTETRICS AND GYNECOLOGY | Facility: HOSPITAL | Age: 39
End: 2024-03-12
Payer: COMMERCIAL

## 2024-03-12 VITALS — RESPIRATION RATE: 16 BRPM

## 2024-03-12 LAB
POCT GLUCOSE: 44 MG/DL (ref 70–110)
POCT GLUCOSE: 62 MG/DL (ref 70–110)
POCT GLUCOSE: 68 MG/DL (ref 70–110)
POCT GLUCOSE: 70 MG/DL (ref 70–110)
POCT GLUCOSE: 71 MG/DL (ref 70–110)
POCT GLUCOSE: 84 MG/DL (ref 70–110)
POCT GLUCOSE: 87 MG/DL (ref 70–110)

## 2024-03-12 PROCEDURE — 11000001 HC ACUTE MED/SURG PRIVATE ROOM

## 2024-03-12 PROCEDURE — 59400 OBSTETRICAL CARE: CPT | Mod: AA,,, | Performed by: ANESTHESIOLOGY

## 2024-03-12 PROCEDURE — 25000003 PHARM REV CODE 250: Performed by: OBSTETRICS & GYNECOLOGY

## 2024-03-12 PROCEDURE — 63600175 PHARM REV CODE 636 W HCPCS: Performed by: STUDENT IN AN ORGANIZED HEALTH CARE EDUCATION/TRAINING PROGRAM

## 2024-03-12 PROCEDURE — 25000003 PHARM REV CODE 250: Performed by: ANESTHESIOLOGY

## 2024-03-12 PROCEDURE — 51702 INSERT TEMP BLADDER CATH: CPT

## 2024-03-12 PROCEDURE — 62326 NJX INTERLAMINAR LMBR/SAC: CPT | Performed by: ANESTHESIOLOGY

## 2024-03-12 PROCEDURE — 63600175 PHARM REV CODE 636 W HCPCS

## 2024-03-12 PROCEDURE — 96372 THER/PROPH/DIAG INJ SC/IM: CPT | Performed by: OBSTETRICS & GYNECOLOGY

## 2024-03-12 PROCEDURE — 37000008 HC ANESTHESIA 1ST 15 MINUTES: Performed by: ANESTHESIOLOGY

## 2024-03-12 PROCEDURE — 63600175 PHARM REV CODE 636 W HCPCS: Mod: JZ,JG | Performed by: ANESTHESIOLOGY

## 2024-03-12 PROCEDURE — 37000009 HC ANESTHESIA EA ADD 15 MINS: Performed by: ANESTHESIOLOGY

## 2024-03-12 PROCEDURE — 63600175 PHARM REV CODE 636 W HCPCS: Performed by: OBSTETRICS & GYNECOLOGY

## 2024-03-12 PROCEDURE — 72200005 HC VAGINAL DELIVERY LEVEL II

## 2024-03-12 PROCEDURE — 99233 SBSQ HOSP IP/OBS HIGH 50: CPT | Mod: ,,, | Performed by: OBSTETRICS & GYNECOLOGY

## 2024-03-12 PROCEDURE — 72100002 HC LABOR CARE, 1ST 8 HOURS

## 2024-03-12 PROCEDURE — 25000003 PHARM REV CODE 250: Performed by: STUDENT IN AN ORGANIZED HEALTH CARE EDUCATION/TRAINING PROGRAM

## 2024-03-12 RX ORDER — ONDANSETRON HYDROCHLORIDE 2 MG/ML
4 INJECTION, SOLUTION INTRAVENOUS ONCE
Status: COMPLETED | OUTPATIENT
Start: 2024-03-12 | End: 2024-03-12

## 2024-03-12 RX ORDER — HYDRALAZINE HYDROCHLORIDE 20 MG/ML
10 INJECTION INTRAMUSCULAR; INTRAVENOUS ONCE AS NEEDED
Status: DISCONTINUED | OUTPATIENT
Start: 2024-03-12 | End: 2024-03-14 | Stop reason: HOSPADM

## 2024-03-12 RX ORDER — FENTANYL CITRATE 50 UG/ML
INJECTION, SOLUTION INTRAMUSCULAR; INTRAVENOUS
Status: DISCONTINUED | OUTPATIENT
Start: 2024-03-12 | End: 2024-03-14

## 2024-03-12 RX ORDER — SIMETHICONE 80 MG
1 TABLET,CHEWABLE ORAL EVERY 4 HOURS PRN
Status: DISCONTINUED | OUTPATIENT
Start: 2024-03-12 | End: 2024-03-14 | Stop reason: HOSPADM

## 2024-03-12 RX ORDER — MISOPROSTOL 100 UG/1
800 TABLET ORAL ONCE AS NEEDED
Status: DISCONTINUED | OUTPATIENT
Start: 2024-03-12 | End: 2024-03-14 | Stop reason: HOSPADM

## 2024-03-12 RX ORDER — DIPHENHYDRAMINE HCL 25 MG
25 CAPSULE ORAL EVERY 4 HOURS PRN
Status: DISCONTINUED | OUTPATIENT
Start: 2024-03-12 | End: 2024-03-14 | Stop reason: HOSPADM

## 2024-03-12 RX ORDER — CALCIUM CARBONATE 200(500)MG
500 TABLET,CHEWABLE ORAL 3 TIMES DAILY PRN
Status: DISCONTINUED | OUTPATIENT
Start: 2024-03-12 | End: 2024-03-14 | Stop reason: HOSPADM

## 2024-03-12 RX ORDER — OXYTOCIN 10 [USP'U]/ML
10 INJECTION, SOLUTION INTRAMUSCULAR; INTRAVENOUS ONCE AS NEEDED
Status: DISCONTINUED | OUTPATIENT
Start: 2024-03-12 | End: 2024-03-14 | Stop reason: HOSPADM

## 2024-03-12 RX ORDER — FENTANYL/BUPIVACAINE/NS/PF 2-1250MCG
PLASTIC BAG, INJECTION (ML) INJECTION CONTINUOUS
Status: DISCONTINUED | OUTPATIENT
Start: 2024-03-12 | End: 2024-03-14 | Stop reason: HOSPADM

## 2024-03-12 RX ORDER — DEXTROSE, SODIUM CHLORIDE, SODIUM LACTATE, POTASSIUM CHLORIDE, AND CALCIUM CHLORIDE 5; .6; .31; .03; .02 G/100ML; G/100ML; G/100ML; G/100ML; G/100ML
INJECTION, SOLUTION INTRAVENOUS CONTINUOUS
Status: DISCONTINUED | OUTPATIENT
Start: 2024-03-12 | End: 2024-03-12

## 2024-03-12 RX ORDER — LABETALOL HYDROCHLORIDE 5 MG/ML
20 INJECTION, SOLUTION INTRAVENOUS ONCE
Status: COMPLETED | OUTPATIENT
Start: 2024-03-12 | End: 2024-03-12

## 2024-03-12 RX ORDER — ONDANSETRON 4 MG/1
8 TABLET, ORALLY DISINTEGRATING ORAL EVERY 8 HOURS PRN
Status: DISCONTINUED | OUTPATIENT
Start: 2024-03-12 | End: 2024-03-12 | Stop reason: SDUPTHER

## 2024-03-12 RX ORDER — OXYTOCIN/RINGER'S LACTATE 30/500 ML
334 PLASTIC BAG, INJECTION (ML) INTRAVENOUS ONCE AS NEEDED
Status: DISCONTINUED | OUTPATIENT
Start: 2024-03-12 | End: 2024-03-12 | Stop reason: SDUPTHER

## 2024-03-12 RX ORDER — OXYCODONE AND ACETAMINOPHEN 10; 325 MG/1; MG/1
1 TABLET ORAL EVERY 6 HOURS PRN
Status: DISCONTINUED | OUTPATIENT
Start: 2024-03-12 | End: 2024-03-14 | Stop reason: HOSPADM

## 2024-03-12 RX ORDER — OXYTOCIN/RINGER'S LACTATE 30/500 ML
334 PLASTIC BAG, INJECTION (ML) INTRAVENOUS ONCE AS NEEDED
Status: DISCONTINUED | OUTPATIENT
Start: 2024-03-12 | End: 2024-03-14 | Stop reason: HOSPADM

## 2024-03-12 RX ORDER — MUPIROCIN 20 MG/G
OINTMENT TOPICAL
Status: DISCONTINUED | OUTPATIENT
Start: 2024-03-12 | End: 2024-03-14 | Stop reason: HOSPADM

## 2024-03-12 RX ORDER — LABETALOL HCL 20 MG/4 ML
80 SYRINGE (ML) INTRAVENOUS ONCE AS NEEDED
Status: DISCONTINUED | OUTPATIENT
Start: 2024-03-12 | End: 2024-03-14 | Stop reason: HOSPADM

## 2024-03-12 RX ORDER — CARBOPROST TROMETHAMINE 250 UG/ML
250 INJECTION, SOLUTION INTRAMUSCULAR
Status: DISCONTINUED | OUTPATIENT
Start: 2024-03-12 | End: 2024-03-12 | Stop reason: SDUPTHER

## 2024-03-12 RX ORDER — OXYTOCIN/RINGER'S LACTATE 30/500 ML
0-30 PLASTIC BAG, INJECTION (ML) INTRAVENOUS CONTINUOUS
Status: DISCONTINUED | OUTPATIENT
Start: 2024-03-12 | End: 2024-03-14 | Stop reason: HOSPADM

## 2024-03-12 RX ORDER — OXYTOCIN/RINGER'S LACTATE 30/500 ML
95 PLASTIC BAG, INJECTION (ML) INTRAVENOUS ONCE AS NEEDED
Status: DISCONTINUED | OUTPATIENT
Start: 2024-03-12 | End: 2024-03-14 | Stop reason: HOSPADM

## 2024-03-12 RX ORDER — MAGNESIUM SULFATE HEPTAHYDRATE 40 MG/ML
2 INJECTION, SOLUTION INTRAVENOUS CONTINUOUS
Status: DISCONTINUED | OUTPATIENT
Start: 2024-03-13 | End: 2024-03-14 | Stop reason: HOSPADM

## 2024-03-12 RX ORDER — EPHEDRINE SULFATE 50 MG/ML
10 INJECTION, SOLUTION INTRAVENOUS EVERY 5 MIN PRN
Status: DISCONTINUED | OUTPATIENT
Start: 2024-03-12 | End: 2024-03-14 | Stop reason: HOSPADM

## 2024-03-12 RX ORDER — ONDANSETRON 4 MG/1
8 TABLET, ORALLY DISINTEGRATING ORAL EVERY 8 HOURS PRN
Status: DISCONTINUED | OUTPATIENT
Start: 2024-03-12 | End: 2024-03-14 | Stop reason: HOSPADM

## 2024-03-12 RX ORDER — ACETAMINOPHEN 325 MG/1
325 TABLET ORAL EVERY 4 HOURS PRN
Status: DISCONTINUED | OUTPATIENT
Start: 2024-03-12 | End: 2024-03-14 | Stop reason: HOSPADM

## 2024-03-12 RX ORDER — MISOPROSTOL 100 UG/1
800 TABLET ORAL ONCE AS NEEDED
Status: DISCONTINUED | OUTPATIENT
Start: 2024-03-12 | End: 2024-03-12 | Stop reason: SDUPTHER

## 2024-03-12 RX ORDER — OXYTOCIN/RINGER'S LACTATE 30/500 ML
95 PLASTIC BAG, INJECTION (ML) INTRAVENOUS ONCE AS NEEDED
Status: DISCONTINUED | OUTPATIENT
Start: 2024-03-12 | End: 2024-03-12 | Stop reason: SDUPTHER

## 2024-03-12 RX ORDER — NALOXONE HCL 0.4 MG/ML
0.02 VIAL (ML) INJECTION
Status: DISCONTINUED | OUTPATIENT
Start: 2024-03-12 | End: 2024-03-14 | Stop reason: HOSPADM

## 2024-03-12 RX ORDER — SODIUM CHLORIDE, SODIUM LACTATE, POTASSIUM CHLORIDE, CALCIUM CHLORIDE 600; 310; 30; 20 MG/100ML; MG/100ML; MG/100ML; MG/100ML
1000 INJECTION, SOLUTION INTRAVENOUS CONTINUOUS
Status: ACTIVE | OUTPATIENT
Start: 2024-03-13 | End: 2024-03-13

## 2024-03-12 RX ORDER — BUPIVACAINE HYDROCHLORIDE 2.5 MG/ML
INJECTION, SOLUTION EPIDURAL; INFILTRATION; INTRACAUDAL
Status: COMPLETED | OUTPATIENT
Start: 2024-03-12 | End: 2024-03-12

## 2024-03-12 RX ORDER — GUAIFENESIN 100 MG/5ML
200 SOLUTION ORAL EVERY 4 HOURS PRN
Status: DISCONTINUED | OUTPATIENT
Start: 2024-03-12 | End: 2024-03-14 | Stop reason: HOSPADM

## 2024-03-12 RX ORDER — NALBUPHINE HYDROCHLORIDE 10 MG/ML
2.5 INJECTION, SOLUTION INTRAMUSCULAR; INTRAVENOUS; SUBCUTANEOUS ONCE
Status: DISCONTINUED | OUTPATIENT
Start: 2024-03-12 | End: 2024-03-12 | Stop reason: RX

## 2024-03-12 RX ORDER — DOCUSATE SODIUM 100 MG/1
200 CAPSULE, LIQUID FILLED ORAL 2 TIMES DAILY PRN
Status: DISCONTINUED | OUTPATIENT
Start: 2024-03-12 | End: 2024-03-14 | Stop reason: HOSPADM

## 2024-03-12 RX ORDER — SODIUM CHLORIDE, SODIUM LACTATE, POTASSIUM CHLORIDE, CALCIUM CHLORIDE 600; 310; 30; 20 MG/100ML; MG/100ML; MG/100ML; MG/100ML
INJECTION, SOLUTION INTRAVENOUS CONTINUOUS
Status: DISCONTINUED | OUTPATIENT
Start: 2024-03-12 | End: 2024-03-14 | Stop reason: HOSPADM

## 2024-03-12 RX ORDER — SODIUM CITRATE AND CITRIC ACID MONOHYDRATE 334; 500 MG/5ML; MG/5ML
30 SOLUTION ORAL ONCE
Status: COMPLETED | OUTPATIENT
Start: 2024-03-12 | End: 2024-03-12

## 2024-03-12 RX ORDER — CALCIUM GLUCONATE 98 MG/ML
1 INJECTION, SOLUTION INTRAVENOUS
Status: DISCONTINUED | OUTPATIENT
Start: 2024-03-13 | End: 2024-03-14 | Stop reason: HOSPADM

## 2024-03-12 RX ORDER — DIPHENOXYLATE HYDROCHLORIDE AND ATROPINE SULFATE 2.5; .025 MG/1; MG/1
2 TABLET ORAL EVERY 6 HOURS PRN
Status: DISCONTINUED | OUTPATIENT
Start: 2024-03-12 | End: 2024-03-12 | Stop reason: SDUPTHER

## 2024-03-12 RX ORDER — MAGNESIUM SULFATE HEPTAHYDRATE 40 MG/ML
4 INJECTION, SOLUTION INTRAVENOUS ONCE
Status: COMPLETED | OUTPATIENT
Start: 2024-03-12 | End: 2024-03-13

## 2024-03-12 RX ORDER — NIFEDIPINE 60 MG/1
60 TABLET, EXTENDED RELEASE ORAL DAILY
Status: DISCONTINUED | OUTPATIENT
Start: 2024-03-12 | End: 2024-03-14 | Stop reason: HOSPADM

## 2024-03-12 RX ORDER — DIPHENOXYLATE HYDROCHLORIDE AND ATROPINE SULFATE 2.5; .025 MG/1; MG/1
2 TABLET ORAL EVERY 6 HOURS PRN
Status: DISCONTINUED | OUTPATIENT
Start: 2024-03-12 | End: 2024-03-14 | Stop reason: HOSPADM

## 2024-03-12 RX ORDER — SIMETHICONE 80 MG
1 TABLET,CHEWABLE ORAL 4 TIMES DAILY PRN
Status: DISCONTINUED | OUTPATIENT
Start: 2024-03-12 | End: 2024-03-12

## 2024-03-12 RX ORDER — IBUPROFEN 600 MG/1
600 TABLET ORAL EVERY 6 HOURS
Status: DISCONTINUED | OUTPATIENT
Start: 2024-03-13 | End: 2024-03-14 | Stop reason: HOSPADM

## 2024-03-12 RX ORDER — LABETALOL HCL 20 MG/4 ML
20 SYRINGE (ML) INTRAVENOUS ONCE AS NEEDED
Status: DISCONTINUED | OUTPATIENT
Start: 2024-03-13 | End: 2024-03-14 | Stop reason: HOSPADM

## 2024-03-12 RX ORDER — LABETALOL HCL 20 MG/4 ML
20 SYRINGE (ML) INTRAVENOUS ONCE AS NEEDED
Status: COMPLETED | OUTPATIENT
Start: 2024-03-12 | End: 2024-03-12

## 2024-03-12 RX ORDER — DIPHENHYDRAMINE HYDROCHLORIDE 50 MG/ML
25 INJECTION INTRAMUSCULAR; INTRAVENOUS EVERY 4 HOURS PRN
Status: DISCONTINUED | OUTPATIENT
Start: 2024-03-12 | End: 2024-03-14 | Stop reason: HOSPADM

## 2024-03-12 RX ORDER — METHYLERGONOVINE MALEATE 0.2 MG/ML
200 INJECTION INTRAVENOUS ONCE AS NEEDED
Status: DISCONTINUED | OUTPATIENT
Start: 2024-03-12 | End: 2024-03-14 | Stop reason: HOSPADM

## 2024-03-12 RX ORDER — CARBOPROST TROMETHAMINE 250 UG/ML
250 INJECTION, SOLUTION INTRAMUSCULAR
Status: DISCONTINUED | OUTPATIENT
Start: 2024-03-12 | End: 2024-03-14 | Stop reason: HOSPADM

## 2024-03-12 RX ORDER — OXYTOCIN/RINGER'S LACTATE 30/500 ML
334 PLASTIC BAG, INJECTION (ML) INTRAVENOUS ONCE
Status: DISCONTINUED | OUTPATIENT
Start: 2024-03-12 | End: 2024-03-14 | Stop reason: HOSPADM

## 2024-03-12 RX ORDER — OXYCODONE AND ACETAMINOPHEN 5; 325 MG/1; MG/1
1 TABLET ORAL EVERY 6 HOURS PRN
Status: DISCONTINUED | OUTPATIENT
Start: 2024-03-12 | End: 2024-03-14 | Stop reason: HOSPADM

## 2024-03-12 RX ORDER — OXYTOCIN/RINGER'S LACTATE 30/500 ML
95 PLASTIC BAG, INJECTION (ML) INTRAVENOUS ONCE
Status: DISCONTINUED | OUTPATIENT
Start: 2024-03-12 | End: 2024-03-14 | Stop reason: HOSPADM

## 2024-03-12 RX ORDER — OXYTOCIN 10 [USP'U]/ML
10 INJECTION, SOLUTION INTRAMUSCULAR; INTRAVENOUS ONCE AS NEEDED
Status: DISCONTINUED | OUTPATIENT
Start: 2024-03-12 | End: 2024-03-12 | Stop reason: SDUPTHER

## 2024-03-12 RX ORDER — OXYTOCIN/RINGER'S LACTATE 30/500 ML
95 PLASTIC BAG, INJECTION (ML) INTRAVENOUS ONCE
Status: DISCONTINUED | OUTPATIENT
Start: 2024-03-12 | End: 2024-03-12

## 2024-03-12 RX ORDER — LIDOCAINE HYDROCHLORIDE 10 MG/ML
10 INJECTION INFILTRATION; PERINEURAL ONCE AS NEEDED
Status: DISCONTINUED | OUTPATIENT
Start: 2024-03-12 | End: 2024-03-14 | Stop reason: HOSPADM

## 2024-03-12 RX ORDER — MISOPROSTOL 100 MCG
25 TABLET ORAL ONCE
Status: COMPLETED | OUTPATIENT
Start: 2024-03-12 | End: 2024-03-12

## 2024-03-12 RX ORDER — METHYLERGONOVINE MALEATE 0.2 MG/ML
200 INJECTION INTRAVENOUS ONCE AS NEEDED
Status: DISCONTINUED | OUTPATIENT
Start: 2024-03-12 | End: 2024-03-12 | Stop reason: SDUPTHER

## 2024-03-12 RX ORDER — BUPIVACAINE HYDROCHLORIDE 2.5 MG/ML
INJECTION, SOLUTION INFILTRATION; PERINEURAL
Status: DISCONTINUED | OUTPATIENT
Start: 2024-03-12 | End: 2024-03-14

## 2024-03-12 RX ORDER — LABETALOL HCL 20 MG/4 ML
40 SYRINGE (ML) INTRAVENOUS ONCE AS NEEDED
Status: DISCONTINUED | OUTPATIENT
Start: 2024-03-12 | End: 2024-03-14 | Stop reason: HOSPADM

## 2024-03-12 RX ORDER — HYDROCORTISONE 25 MG/G
CREAM TOPICAL 3 TIMES DAILY PRN
Status: DISCONTINUED | OUTPATIENT
Start: 2024-03-12 | End: 2024-03-14 | Stop reason: HOSPADM

## 2024-03-12 RX ADMIN — LABETALOL HYDROCHLORIDE 20 MG: 5 INJECTION INTRAVENOUS at 10:03

## 2024-03-12 RX ADMIN — Medication 2 MILLI-UNITS/MIN: at 07:03

## 2024-03-12 RX ADMIN — NIFEDIPINE 60 MG: 60 TABLET, FILM COATED, EXTENDED RELEASE ORAL at 10:03

## 2024-03-12 RX ADMIN — SODIUM CITRATE AND CITRIC ACID MONOHYDRATE 30 ML: 500; 334 SOLUTION ORAL at 05:03

## 2024-03-12 RX ADMIN — LABETALOL HYDROCHLORIDE 300 MG: 100 TABLET, FILM COATED ORAL at 09:03

## 2024-03-12 RX ADMIN — SODIUM CHLORIDE, POTASSIUM CHLORIDE, SODIUM LACTATE AND CALCIUM CHLORIDE 1000 ML: 600; 310; 30; 20 INJECTION, SOLUTION INTRAVENOUS at 06:03

## 2024-03-12 RX ADMIN — IBUPROFEN 600 MG: 600 TABLET, FILM COATED ORAL at 11:03

## 2024-03-12 RX ADMIN — INSULIN ASPART 18 UNITS: 100 INJECTION, SOLUTION INTRAVENOUS; SUBCUTANEOUS at 08:03

## 2024-03-12 RX ADMIN — FENTANYL CITRATE 100 MCG: 50 INJECTION, SOLUTION INTRAMUSCULAR; INTRAVENOUS at 08:03

## 2024-03-12 RX ADMIN — SODIUM CHLORIDE, SODIUM LACTATE, POTASSIUM CHLORIDE, CALCIUM CHLORIDE AND DEXTROSE MONOHYDRATE: 5; 600; 310; 30; 20 INJECTION, SOLUTION INTRAVENOUS at 01:03

## 2024-03-12 RX ADMIN — LABETALOL HYDROCHLORIDE 300 MG: 100 TABLET, FILM COATED ORAL at 02:03

## 2024-03-12 RX ADMIN — Medication 10 ML/HR: at 06:03

## 2024-03-12 RX ADMIN — DEXTROSE MONOHYDRATE 125 ML: 100 INJECTION, SOLUTION INTRAVENOUS at 01:03

## 2024-03-12 RX ADMIN — LABETALOL HYDROCHLORIDE 300 MG: 100 TABLET, FILM COATED ORAL at 05:03

## 2024-03-12 RX ADMIN — BUPIVACAINE HYDROCHLORIDE 5 ML: 2.5 INJECTION, SOLUTION INFILTRATION; PERINEURAL at 08:03

## 2024-03-12 RX ADMIN — LABETALOL HYDROCHLORIDE 20 MG: 5 INJECTION, SOLUTION INTRAVENOUS at 11:03

## 2024-03-12 RX ADMIN — MAGNESIUM SULFATE HEPTAHYDRATE 4 G: 40 INJECTION, SOLUTION INTRAVENOUS at 11:03

## 2024-03-12 RX ADMIN — MISOPROSTOL 25 MCG: 100 TABLET ORAL at 12:03

## 2024-03-12 RX ADMIN — INSULIN HUMAN 12 UNITS: 100 INJECTION, SUSPENSION SUBCUTANEOUS at 08:03

## 2024-03-12 RX ADMIN — GUAIFENESIN 200 MG: 200 SOLUTION ORAL at 11:03

## 2024-03-12 RX ADMIN — BUPIVACAINE HYDROCHLORIDE 10 ML: 2.5 INJECTION, SOLUTION EPIDURAL; INFILTRATION; INTRACAUDAL; PERINEURAL at 05:03

## 2024-03-12 RX ADMIN — LABETALOL HYDROCHLORIDE 20 MG: 5 INJECTION INTRAVENOUS at 09:03

## 2024-03-12 RX ADMIN — ONDANSETRON 4 MG: 2 INJECTION INTRAMUSCULAR; INTRAVENOUS at 09:03

## 2024-03-12 RX ADMIN — SODIUM CHLORIDE, POTASSIUM CHLORIDE, SODIUM LACTATE AND CALCIUM CHLORIDE 1000 ML: 600; 310; 30; 20 INJECTION, SOLUTION INTRAVENOUS at 11:03

## 2024-03-12 RX ADMIN — LABETALOL HYDROCHLORIDE 20 MG: 5 INJECTION INTRAVENOUS at 03:03

## 2024-03-12 NOTE — ANESTHESIA PROCEDURE NOTES
Epidural    Patient location during procedure: OB   Reason for block: primary anesthetic   Reason for block: labor analgesia requested by patient and obstetrician  Diagnosis: LABOR   Start time: 3/12/2024 5:55 PM  Timeout: 3/12/2024 5:55 PM  End time: 3/12/2024 6:16 PM    Staffing  Performing Provider: Liang Ballard MD  Authorizing Provider: Liang Ballard MD    Staffing  Performed by: Liang Ballard MD  Authorized by: Liang Ballard MD        Preanesthetic Checklist  Completed: patient identified, IV checked, site marked, risks and benefits discussed, surgical consent, monitors and equipment checked, pre-op evaluation, timeout performed, anesthesia consent given, hand hygiene performed and patient being monitored  Preparation  Patient position: sitting  Prep: ChloraPrep  Reason for block: primary anesthetic   Epidural  Skin Anesthetic: lidocaine 1%  Skin Wheal: 5 mL  Administration type: continuous  Approach: midline  Interspace: L4-5    Injection technique: SUNDAR saline  Needle and Epidural Catheter  Needle type: Tuohy   Needle gauge: 17  Needle length: 3.5 inches  Catheter type: springwound and multi-orifice  Catheter size: 19 G  Insertion Attempts: 1  Test dose: 3 mL of lidocaine 1.5% with Epi 1-to-200,000  Additional Documentation: incremental injection, negative aspiration for heme and CSF and no paresthesia on injection  Needle localization: anatomical landmarks  Assessment  Ease of block: easy  Patient's tolerance of the procedure: comfortable throughout block  Additional Notes  Epidural placed at Obstetrician's request for Labor Analgesia  Informed consent: signed by patient.     Indication: obstetrical pain.       Sitting position, sterile preparation of site (in usual fashion, Chloraprep, allowed to dry according to  recommendations),   local anesthesia of 1% xylocaine 5 ml to skin, subq, & interspinous ligament with 25 ga 1.5 inch needle  Epidural approach midline, 17 gauge  TOUHY needle (placed via loss of resistance technique saline c small compressible air bubble),     Good loss of resistance on 1st pass     Negative blood, negative csf, negative paresthesia on epidural needle placement  1 +4 ml 0.25% Bupivacaine MPF through epidural needle  PALMA inserted, epidural needle removed  Negative blood, negative csf, negative paresthesia on PALMA placement  Negative test dose of PALMA with 3 ml 1.5% Xylocaine mpf c epinephrine 1/200,000  Loading dose PALMA 5 ml 0.25% Bupivacaine mpf    EPIDURAL INFUSION: 0.125% Bupivacaine with Fentanyl 2 mcg/ml at 10 ml/hr , PCEA 4 ml bolus Q 15 minutes,   FLUID BOLUS 500 ML OF LACTATED RINGERS PRIOR TO EPIDURAL PLACEMENT.     UTERINE DISPLACEMENT ANABELLE & BECCA AFTER EPIDURAL PLACEMENT.     Procedure tolerated: well.     Complications: None.       American Society of Anesthesiologists# (ASA) physical status classification: Class III.       No inadvertent dural puncture with Tuohy.      Medications:    Medications: bupivacaine (pf) (MARCAINE) injection 0.25% - Epidural   10 mL - 3/12/2024 5:49:00 PM

## 2024-03-12 NOTE — PROGRESS NOTES
Tawnya Love is a 38 y.o.  female  at 37w3d gestation who is being seen in the hospital for chronic hypertension with worsening blood pressure.  She has chronic hypertension and is currently on labetalol 300 mg q.8 hours. On 2023, she had baseline 24 hour urine with <6.8 mg/dL protein.  24 hour urine on 3/5/24 showed 96.6 mg protein. On 3/11/2024, she had preeclampsia labs with platelets 245, creatinine 0.69, uric acid 6.1, AST 20, ALT 27, .  She is s/p Celestone x1 on 3/4/2024 and 3/5/2024.  She was noted to have a few subtle late decelerations overnight.  She denies leaking of fluid, vaginal bleeding, contractions, or decreased fetal movement. She denies headaches, visual disturbances, or epigastric pain.    She had ultrasound indicated cerclage in her last pregnancy and was on nightly vaginal progesterone. She delivered at 36 weeks in that pregnancy due to preeclampsia. She is status post prophylactic cerclage on 2023 which was removed last week. She has increased BMI of 34 at consult visit. She has recurrent GDM and is on insulin, 18 units of NPH and 12 units of Humalog in the morning, 6 units of Humalog for supper, and 32 units of NPH at bedtime. She has corrective formula of 1 unit of Humalog for every 8 mg over 140. She is of advanced maternal age and will be 38 by the ELVIS. She had negative cell free DNA. She is is on low-dose aspirin daily.       Review of Systems   12 point review of systems conducted, negative except as stated in the history of present illness. See HPI for details.    Past Medical History:   Diagnosis Date    Essential (primary) hypertension     Mixed hyperlipidemia     Personal history of gestational diabetes      labor in second trimester with  delivery in second trimester     Thyroid disease     Well adult exam         Past Surgical History:   Procedure Laterality Date    CERVICAL CERCLAGE N/A 2022    DILATION AND CURETTAGE OF UTERUS           Social Connections: Not on file        Temp:  [97.9 °F (36.6 °C)-98.9 °F (37.2 °C)] 98.1 °F (36.7 °C)  Pulse:  [68-98] 70  Resp:  [16-18] 18  SpO2:  [91 %-100 %] 99 %  BP: (126-170)/(80-99) 141/90    Physical Exam  Vitals and nursing note reviewed.   Constitutional:       Appearance: Normal appearance.   HENT:      Head: Normocephalic.   Pulmonary:      Effort: Pulmonary effort is normal.   Abdominal:      Palpations: Abdomen is soft.      Comments: Gravid uterus, no uterine tenderness   Musculoskeletal:      Cervical back: Neck supple.      Right lower leg: No edema.      Left lower leg: No edema.   Skin:     General: Skin is dry.   Neurological:      Mental Status: She is alert and oriented to person, place, and time.      Deep Tendon Reflexes: Reflexes normal.   Psychiatric:         Mood and Affect: Mood normal.         Thought Content: Thought content normal.         Judgment: Judgment normal.          Recent Results (from the past 48 hour(s))   Comprehensive metabolic panel    Collection Time: 03/10/24  7:52 PM   Result Value Ref Range    Sodium Level 140 136 - 145 mmol/L    Potassium Level 4.6 3.5 - 5.1 mmol/L    Chloride 109 (H) 98 - 107 mmol/L    Carbon Dioxide 20 (L) 22 - 29 mmol/L    Glucose Level 85 74 - 100 mg/dL    Blood Urea Nitrogen 10.0 7.0 - 18.7 mg/dL    Creatinine 0.90 0.55 - 1.02 mg/dL    Calcium Level Total 8.6 8.4 - 10.2 mg/dL    Protein Total 6.4 6.4 - 8.3 gm/dL    Albumin Level 2.6 (L) 3.5 - 5.0 g/dL    Globulin 3.8 (H) 2.4 - 3.5 gm/dL    Albumin/Globulin Ratio 0.7 (L) 1.1 - 2.0 ratio    Bilirubin Total 0.3 <=1.5 mg/dL    Alkaline Phosphatase 197 (H) 40 - 150 unit/L    Alanine Aminotransferase 31 0 - 55 unit/L    Aspartate Aminotransferase 20 5 - 34 unit/L    eGFR >60 mls/min/1.73/m2   Protein/Creatinine Ratio, Urine    Collection Time: 03/10/24  7:52 PM   Result Value Ref Range    Urine Protein Level 14.1 mg/dL    Urine Creatinine 225.5 (H) 45.0 - 106.0 mg/dL    Urine Protein/Creatinine  Ratio 0.1    Uric acid    Collection Time: 03/10/24  7:52 PM   Result Value Ref Range    Uric Acid 6.1 (H) 2.6 - 6.0 mg/dL   CBC with Differential    Collection Time: 03/10/24  7:52 PM   Result Value Ref Range    WBC 5.61 4.50 - 11.50 x10(3)/mcL    RBC 3.70 (L) 4.20 - 5.40 x10(6)/mcL    Hgb 10.1 (L) 12.0 - 16.0 g/dL    Hct 31.8 (L) 37.0 - 47.0 %    MCV 85.9 80.0 - 94.0 fL    MCH 27.3 27.0 - 31.0 pg    MCHC 31.8 (L) 33.0 - 36.0 g/dL    RDW 17.2 (H) 11.5 - 17.0 %    Platelet 274 130 - 400 x10(3)/mcL    MPV 11.2 (H) 7.4 - 10.4 fL    Neut % 52.2 %    Lymph % 36.0 %    Mono % 9.4 %    Eos % 1.8 %    Basophil % 0.2 %    Lymph # 2.02 0.6 - 4.6 x10(3)/mcL    Neut # 2.93 2.1 - 9.2 x10(3)/mcL    Mono # 0.53 0.1 - 1.3 x10(3)/mcL    Eos # 0.10 0 - 0.9 x10(3)/mcL    Baso # 0.01 <=0.2 x10(3)/mcL    IG# 0.02 0 - 0.04 x10(3)/mcL    IG% 0.4 %    NRBC% 0.4 %   Type & Screen    Collection Time: 03/10/24  8:34 PM   Result Value Ref Range    Group & Rh A POS     Indirect Sonia GEL NEG     Specimen Outdate 03/13/2024 23:59    SYPHILIS ANTIBODY (WITH REFLEX RPR)    Collection Time: 03/10/24  8:34 PM   Result Value Ref Range    Syphilis Antibody Nonreactive Nonreactive, Equivocal   POCT Glucose, Hand-Held Device    Collection Time: 03/10/24 10:41 PM   Result Value Ref Range    POC Glucose 103 (A) 70 - 110 MG/DL   POCT glucose    Collection Time: 03/10/24 10:41 PM   Result Value Ref Range    POCT Glucose 103 70 - 110 mg/dL   POCT Glucose, Hand-Held Device    Collection Time: 03/11/24  6:15 AM   Result Value Ref Range    POC Glucose 59 (A) 70 - 110 MG/DL   POCT glucose    Collection Time: 03/11/24  6:15 AM   Result Value Ref Range    POCT Glucose 59 (L) 70 - 110 mg/dL   POCT glucose    Collection Time: 03/11/24  8:01 AM   Result Value Ref Range    POCT Glucose 76 70 - 110 mg/dL   AST (SGOT)    Collection Time: 03/11/24  8:29 AM   Result Value Ref Range    Aspartate Aminotransferase 20 5 - 34 unit/L   ALT (SGPT)    Collection Time: 03/11/24   8:29 AM   Result Value Ref Range    Alanine Aminotransferase 27 0 - 55 unit/L   Lactate Dehydrogenase    Collection Time: 03/11/24  8:29 AM   Result Value Ref Range    Lactate Dehydrogenase 214 125 - 220 U/L   Uric Acid    Collection Time: 03/11/24  8:29 AM   Result Value Ref Range    Uric Acid 6.1 (H) 2.6 - 6.0 mg/dL   CBC Without Differential    Collection Time: 03/11/24  8:29 AM   Result Value Ref Range    WBC 5.46 4.50 - 11.50 x10(3)/mcL    RBC 3.48 (L) 4.20 - 5.40 x10(6)/mcL    Hgb 9.3 (L) 12.0 - 16.0 g/dL    Hct 30.7 (L) 37.0 - 47.0 %    MCV 88.2 80.0 - 94.0 fL    MCH 26.7 (L) 27.0 - 31.0 pg    MCHC 30.3 (L) 33.0 - 36.0 g/dL    RDW 17.4 (H) 11.5 - 17.0 %    Platelet 245 130 - 400 x10(3)/mcL    MPV 10.5 (H) 7.4 - 10.4 fL    NRBC% 0.0 %   Creatinine, Serum    Collection Time: 03/11/24  8:29 AM   Result Value Ref Range    Creatinine 0.69 0.55 - 1.02 mg/dL    eGFR >60 mls/min/1.73/m2   POCT glucose    Collection Time: 03/11/24  9:11 AM   Result Value Ref Range    POCT Glucose 78 70 - 110 mg/dL   POCT Glucose, Hand-Held Device    Collection Time: 03/11/24  9:20 AM   Result Value Ref Range    POC Glucose 78 70 - 110 MG/DL   POCT glucose    Collection Time: 03/11/24 11:56 AM   Result Value Ref Range    POCT Glucose 43 (LL) 70 - 110 mg/dL   POCT glucose    Collection Time: 03/11/24 12:36 PM   Result Value Ref Range    POCT Glucose 76 70 - 110 mg/dL   POCT glucose    Collection Time: 03/11/24  5:47 PM   Result Value Ref Range    POCT Glucose 62 (L) 70 - 110 mg/dL   POCT glucose    Collection Time: 03/11/24  8:17 PM   Result Value Ref Range    POCT Glucose 109 70 - 110 mg/dL   POCT glucose    Collection Time: 03/12/24  6:01 AM   Result Value Ref Range    POCT Glucose 62 (L) 70 - 110 mg/dL   POCT glucose    Collection Time: 03/12/24  7:57 AM   Result Value Ref Range    POCT Glucose 84 70 - 110 mg/dL   POCT glucose    Collection Time: 03/12/24 10:04 AM   Result Value Ref Range    POCT Glucose 87 70 - 110 mg/dL        US  OB Non Rad 14+ Weeks TransAbd, w/Biophysical Profile, w/o NST Single Gestation (XPD)  See Provider Notes for results.     IMPRESSION: Please see provider notes for interpretation    This procedure was auto-finalized by: Virtual Radiologist      Plan    37w3d with:    Chronic hypertension with worsening blood pressure, currently stable, with fetal heart rate decelerations  There is upper normal fetal growth with an EFW of 2574 g at the 40% and the AC at the 90% on 2024.     Chronic hypertension complicates up to 2-5% of pregnancies and is associated with significant adverse pregnancy outcomes including  birth, fetal growth restriction, fetal demise, placental abruption, and  delivery.      On 3/11/2024, she had preeclampsia labs that were reassuring. She is asymptomatic. Blood pressures reviewed and overall stable.  We will continue labetalol 300 mg q.8 hours.     Low dose aspirin as discussed.  If evidence of superimposed preeclampsia, low-dose aspirin will be discontinued.     BPP on 3/11/2024 was 8/8.  However, patient was noted to have a few subtle late decelerations overnight.  With chronic hypertension and worsening blood pressure along with advanced maternal age and GDM on insulin, with the decelerations noted, recommend proceeding with delivery at this time as risks of prematurity are outweighed by risks of continued pregnancy.  After discussion, patient agreed to the plan of care and discussed with Dr. Emanuel who will proceed with induction of labor today.  Her questions were answered.    Reviewed with the patient that induction of labor is a reasonable approach in this setting with the overall tracing reassuring although with the intermittent subtle decelerations there is a higher chance of the baby may not tolerate labor and might require a  section.    Patient was advised of the importance of reporting any time even after discharge if she has any headaches vision problems or  epigastric pain, in view of the rare risk of late postpartum eclampsia.     Magnesium sulfate could be given if evidence of superimposed preeclampsia        Increased BMI in pregnancy  It is important to lose weight after the pregnancy is over, especially before a future pregnancy was discussed. Breastfeeding may be an important tool in reducing the postpartum weight retention. Fetal risks were discussed with short term risk of fetal/ obesity and long term risk of adolescent component of metabolic syndrome.        At high risk for preeclampsia with a history of severe preeclampsia requiring delivery at 36 weeks  With her increased risk for preeclampsia, she agreed to continue low-dose aspirin daily until delivery. Preeclampsia precautions reviewed.        Recurrent gestational diabetes mellitus  Continue 2200 calorie diabetic diet.                We will plan to do CBGS every 2 hours while in labor.  Lactated Ringer could be used before active labor at D5 in active labor with insulin drip if blood sugar over 130.    Advised patient that glucose checks should be continued until 24 hours postpartum.  If all normal values, no need for further monitoring of glucose.         The association of gestational diabetes (50%) with adult-onset type 2 diabetes mellitus was reviewed.  The importance of losing weight after the pregnancy was emphasized, as well as doing a 75 g sugar test after postpartum exam and to have annual checkups every 1-3 years for blood sugars to make sure she does not develop diabetes.        VTE prophylaxis  Continue frequent movement of BLEs.  Advised to immediately report BLE discomfort, swelling, hotness tenderness. Expectant management    Patient was evaluated around 8:30 a.m..     This note was created with the assistance of [employer] voice recognition software. There may be transcription errors as a result of using this technology, however minimal. Effort has been made to assure accuracy of  transcription, but any obvious errors or omissions should be clarified with the author of the document.     Patient was evaluated and examined by Dr. Alfonso. MARIBEL Shah, helped as  scribe in completion of part of note.

## 2024-03-12 NOTE — ANESTHESIA PREPROCEDURE EVALUATION
2024  Tawnya Love is a 38 y.o., female.      Tawnya Love    Pre-op Diagnosis: * No surgery found *         Review of patient's allergies indicates:  No Known Allergies    Current Outpatient Medications   Medication Instructions    aspirin 81 mg, Oral, 2 times daily    blood sugar diagnostic (ONETOUCH ULTRA TEST) Strp USE ONE STRIP FOUR TIMES DAILY TO CHECK BLOOD SUGARS.    blood sugar diagnostic Strp 1 each, Misc.(Non-Drug; Combo Route), 4 times daily    blood-glucose meter kit Use as instructed to check blood glucose    insulin aspart U-100 (NOVOLOG) 18 Units, Subcutaneous, Before breakfast    insulin aspart U-100 (NOVOLOG) 6 Units, Subcutaneous, Before dinner    insulin NPH 12 Units, Subcutaneous, Before breakfast    insulin NPH 32 Units, Subcutaneous, Nightly    insulin syringe-needle U-100 1 mL 31 gauge x 5/16 Syrg Use 1 syringe to skin three x's daily.    labetaloL (NORMODYNE) 300 mg, Oral, 2 times daily    lancets 30 gauge Misc 1 lancet , Misc.(Non-Drug; Combo Route), 4 times daily    ONETOUCH DELICA PLUS LANCET 33 gauge Misc Topical (Top), 4 times daily    ONETOUCH ULTRA2 METER Misc SMARTSIG:Via Meter As Directed    prenatal vit/iron fum/folic ac (PRENATAL 1+1 ORAL) Oral           Past Medical History:   Diagnosis Date    Essential (primary) hypertension     Mixed hyperlipidemia     Personal history of gestational diabetes      labor in second trimester with  delivery in second trimester     Thyroid disease     Well adult exam        Past Surgical History:   Procedure Laterality Date    CERVICAL CERCLAGE N/A 2022    DILATION AND CURETTAGE OF UTERUS          Recent Labs   Lab 03/10/24  1952 03/11/24  0829   WBC 5.61 5.46   RBC 3.70* 3.48*   HGB 10.1* 9.3*   HCT 31.8* 30.7*   MCV 85.9 88.2   MCH 27.3 26.7*   MCHC 31.8* 30.3*   RDW 17.2* 17.4*    245   MPV 11.2* 10.5*        Recent Labs   Lab 03/10/24  1952 03/11/24  0829     --    K 4.6  --    CO2 20*  --    BUN 10.0  --    CREATININE 0.90 0.69   CALCIUM 8.6  --    ALBUMIN 2.6*  --    ALKPHOS 197*  --    ALT 31 27   AST 20 20   BILITOT 0.3  --      Pre-op Assessment    I have reviewed the Patient Summary Reports.    I have reviewed the NPO Status.   I have reviewed the Medications.     Review of Systems  Anesthesia Hx:  No problems with previous Anesthesia             Denies Family Hx of Anesthesia complications.    Denies Personal Hx of Anesthesia complications.                    Social:  Non-Smoker       Cardiovascular:  Exercise tolerance: good   Hypertension       Denies Angina.   Denies Orthopnea.  Denies PND.  hyperlipidemia  Denies GOLDBERG.    Functional Capacity good / => 4 METS                         Musculoskeletal:  Musculoskeletal Normal                Neurological:  Denies TIA.  Denies CVA.                                    Endocrine:  Diabetes (GESTATIONAL DM)           Psych:  Psychiatric Normal                    Physical Exam  General: Well nourished, Alert and Oriented    Airway:  Mallampati: III   Mouth Opening: Normal  TM Distance: Normal  Tongue: Normal  Neck ROM: Normal ROM    Dental:  Intact    Chest/Lungs:  Clear to auscultation    Heart:  Rate: Normal  Rhythm: Regular Rhythm  No pretibial edema  No carotid bruits      Anesthesia Plan  Type of Anesthesia, risks & benefits discussed:    Anesthesia Type: Epidural  Post Op Pain Control Plan: epidural analgesia  Informed Consent: Informed consent signed with the Patient and all parties understand the risks and agree with anesthesia plan.  All questions answered. Patient consented to blood products? Yes  ASA Score: 3  Day of Surgery Review of History & Physical: H&P Update referred to the surgeon/provider.    Ready For Surgery From Anesthesia Perspective.     .

## 2024-03-13 LAB
ALBUMIN SERPL-MCNC: 2.3 G/DL (ref 3.5–5)
ALBUMIN/GLOB SERPL: 0.6 RATIO (ref 1.1–2)
ALP SERPL-CCNC: 175 UNIT/L (ref 40–150)
ALT SERPL-CCNC: 25 UNIT/L (ref 0–55)
ALT SERPL-CCNC: 28 UNIT/L (ref 0–55)
AST SERPL-CCNC: 27 UNIT/L (ref 5–34)
AST SERPL-CCNC: 42 UNIT/L (ref 5–34)
BASOPHILS # BLD AUTO: 0.02 X10(3)/MCL
BASOPHILS NFR BLD AUTO: 0.3 %
BILIRUB SERPL-MCNC: 0.3 MG/DL
BUN SERPL-MCNC: 4.4 MG/DL (ref 7–18.7)
CALCIUM SERPL-MCNC: 7.3 MG/DL (ref 8.4–10.2)
CHLORIDE SERPL-SCNC: 105 MMOL/L (ref 98–107)
CO2 SERPL-SCNC: 22 MMOL/L (ref 22–29)
CREAT SERPL-MCNC: 0.78 MG/DL (ref 0.55–1.02)
CREAT UR-MCNC: 17.8 MG/DL (ref 45–106)
EOSINOPHIL # BLD AUTO: 0.09 X10(3)/MCL (ref 0–0.9)
EOSINOPHIL NFR BLD AUTO: 1.3 %
ERYTHROCYTE [DISTWIDTH] IN BLOOD BY AUTOMATED COUNT: 17.3 % (ref 11.5–17)
GFR SERPLBLD CREATININE-BSD FMLA CKD-EPI: >60 MLS/MIN/1.73/M2
GLOBULIN SER-MCNC: 3.7 GM/DL (ref 2.4–3.5)
GLUCOSE SERPL-MCNC: 117 MG/DL (ref 74–100)
HCT VFR BLD AUTO: 34.8 % (ref 37–47)
HGB BLD-MCNC: 10.3 G/DL (ref 12–16)
IMM GRANULOCYTES # BLD AUTO: 0.02 X10(3)/MCL (ref 0–0.04)
IMM GRANULOCYTES NFR BLD AUTO: 0.3 %
LDH SERPL-CCNC: 262 U/L (ref 125–220)
LYMPHOCYTES # BLD AUTO: 1.68 X10(3)/MCL (ref 0.6–4.6)
LYMPHOCYTES NFR BLD AUTO: 24.7 %
MCH RBC QN AUTO: 26.1 PG (ref 27–31)
MCHC RBC AUTO-ENTMCNC: 29.6 G/DL (ref 33–36)
MCV RBC AUTO: 88.3 FL (ref 80–94)
MONOCYTES # BLD AUTO: 0.63 X10(3)/MCL (ref 0.1–1.3)
MONOCYTES NFR BLD AUTO: 9.3 %
NEUTROPHILS # BLD AUTO: 4.36 X10(3)/MCL (ref 2.1–9.2)
NEUTROPHILS NFR BLD AUTO: 64.1 %
NRBC BLD AUTO-RTO: 0 %
PLATELET # BLD AUTO: 287 X10(3)/MCL (ref 130–400)
PMV BLD AUTO: 10.9 FL (ref 7.4–10.4)
POTASSIUM SERPL-SCNC: 4.5 MMOL/L (ref 3.5–5.1)
PROT SERPL-MCNC: 6 GM/DL (ref 6.4–8.3)
PROT UR STRIP-MCNC: <6.8 MG/DL
RBC # BLD AUTO: 3.94 X10(6)/MCL (ref 4.2–5.4)
SODIUM SERPL-SCNC: 136 MMOL/L (ref 136–145)
URATE SERPL-MCNC: 5.5 MG/DL (ref 2.6–6)
WBC # SPEC AUTO: 6.8 X10(3)/MCL (ref 4.5–11.5)

## 2024-03-13 PROCEDURE — 85025 COMPLETE CBC W/AUTO DIFF WBC: CPT | Performed by: STUDENT IN AN ORGANIZED HEALTH CARE EDUCATION/TRAINING PROGRAM

## 2024-03-13 PROCEDURE — 25000003 PHARM REV CODE 250: Performed by: OBSTETRICS & GYNECOLOGY

## 2024-03-13 PROCEDURE — 84450 TRANSFERASE (AST) (SGOT): CPT | Performed by: OBSTETRICS & GYNECOLOGY

## 2024-03-13 PROCEDURE — 80053 COMPREHEN METABOLIC PANEL: CPT | Performed by: STUDENT IN AN ORGANIZED HEALTH CARE EDUCATION/TRAINING PROGRAM

## 2024-03-13 PROCEDURE — 99232 SBSQ HOSP IP/OBS MODERATE 35: CPT | Mod: ,,, | Performed by: OBSTETRICS & GYNECOLOGY

## 2024-03-13 PROCEDURE — 82570 ASSAY OF URINE CREATININE: CPT | Performed by: STUDENT IN AN ORGANIZED HEALTH CARE EDUCATION/TRAINING PROGRAM

## 2024-03-13 PROCEDURE — 11000001 HC ACUTE MED/SURG PRIVATE ROOM

## 2024-03-13 PROCEDURE — 84460 ALANINE AMINO (ALT) (SGPT): CPT | Performed by: OBSTETRICS & GYNECOLOGY

## 2024-03-13 PROCEDURE — 84550 ASSAY OF BLOOD/URIC ACID: CPT | Performed by: OBSTETRICS & GYNECOLOGY

## 2024-03-13 PROCEDURE — 25000003 PHARM REV CODE 250: Performed by: STUDENT IN AN ORGANIZED HEALTH CARE EDUCATION/TRAINING PROGRAM

## 2024-03-13 PROCEDURE — 63600175 PHARM REV CODE 636 W HCPCS: Performed by: STUDENT IN AN ORGANIZED HEALTH CARE EDUCATION/TRAINING PROGRAM

## 2024-03-13 PROCEDURE — 83615 LACTATE (LD) (LDH) ENZYME: CPT | Performed by: OBSTETRICS & GYNECOLOGY

## 2024-03-13 RX ADMIN — LABETALOL HYDROCHLORIDE 300 MG: 100 TABLET, FILM COATED ORAL at 06:03

## 2024-03-13 RX ADMIN — OXYCODONE HYDROCHLORIDE AND ACETAMINOPHEN 1 TABLET: 5; 325 TABLET ORAL at 08:03

## 2024-03-13 RX ADMIN — IBUPROFEN 600 MG: 600 TABLET, FILM COATED ORAL at 06:03

## 2024-03-13 RX ADMIN — NIFEDIPINE 60 MG: 60 TABLET, FILM COATED, EXTENDED RELEASE ORAL at 08:03

## 2024-03-13 RX ADMIN — IBUPROFEN 600 MG: 600 TABLET, FILM COATED ORAL at 12:03

## 2024-03-13 RX ADMIN — MAGNESIUM SULFATE HEPTAHYDRATE 2 G/HR: 40 INJECTION, SOLUTION INTRAVENOUS at 03:03

## 2024-03-13 RX ADMIN — GUAIFENESIN 200 MG: 200 SOLUTION ORAL at 06:03

## 2024-03-13 RX ADMIN — OXYCODONE AND ACETAMINOPHEN 1 TABLET: 10; 325 TABLET ORAL at 04:03

## 2024-03-13 RX ADMIN — SODIUM CHLORIDE, POTASSIUM CHLORIDE, SODIUM LACTATE AND CALCIUM CHLORIDE: 600; 310; 30; 20 INJECTION, SOLUTION INTRAVENOUS at 03:03

## 2024-03-13 NOTE — ANESTHESIA POSTPROCEDURE EVALUATION
Anesthesia Post Evaluation    Patient: Tawnya Love    Procedure(s) Performed: * No procedures listed *    Final Anesthesia Type: spinal      Patient location during evaluation: PACU  Patient participation: Yes- Able to Participate  Level of consciousness: awake and alert  Post-procedure vital signs: reviewed and stable  Pain management: adequate  Airway patency: patent    PONV status at discharge: vomiting (controlled) and nausea (controlled)  Anesthetic complications: no      Cardiovascular status: hemodynamically stable  Respiratory status: spontaneous ventilation  Hydration status: euvolemic  Follow-up not needed.  Comments: Resolving Neuraxial Blockade              Vitals Value Taken Time   /60 03/13/24 0801   Temp 37.2 °C (99 °F) 03/12/24 2302   Pulse 88 03/13/24 0809   Resp 18 03/12/24 2115   SpO2 93 % 03/13/24 0809   Vitals shown include unvalidated device data.      No case tracking events are documented in the log.      Pain/Dieter Score: Pain Rating Prior to Med Admin: 7 (3/13/2024  6:04 AM)

## 2024-03-13 NOTE — PROGRESS NOTES
Tawnya Love is a 38 y.o.  female  at PPD1, s/p  on 3/12/24 who is being seen in the hospital for chronic hypertension with superimposed preeclampsia.  Following delivery last night, she continued to have worsening blood pressures into the severe range.  She was started on magnesium sulfate.  She is now on labetalol 300 mg q.8 hours and Procardia XL 60 mg daily. On 3/11/2024, she had labs with platelets 245, creatinine 0.69.  On 3/13/2024, uric acid was 5.5, AST 27, ALT 25, .  She had GDM treated with insulin this pregnancy.  She denies headaches, visual disturbances, or epigastric pain.  She reports normal lochia.      Review of Systems   12 point review of systems conducted, negative except as stated in the history of present illness. See HPI for details.    Past Medical History:   Diagnosis Date    Essential (primary) hypertension     Mixed hyperlipidemia     Personal history of gestational diabetes      labor in second trimester with  delivery in second trimester     Thyroid disease     Well adult exam         Past Surgical History:   Procedure Laterality Date    CERVICAL CERCLAGE N/A 2022    DILATION AND CURETTAGE OF UTERUS          Social Connections: Not on file        Temp:  [97.5 °F (36.4 °C)-99.2 °F (37.3 °C)] 99.1 °F (37.3 °C)  Pulse:  [65-91] 74  Resp:  [16-20] 16  SpO2:  [82 %-100 %] 97 %  BP: (108-170)/() 109/64    Physical Exam  Vitals and nursing note reviewed.   Constitutional:       Appearance: Normal appearance.   HENT:      Head: Normocephalic.   Pulmonary:      Effort: Pulmonary effort is normal.   Abdominal:      Palpations: Abdomen is soft.      Comments: No uterine tenderness   Musculoskeletal:      Cervical back: Neck supple.      Right lower leg: No edema.      Left lower leg: No edema.   Skin:     General: Skin is dry.   Neurological:      Mental Status: She is alert and oriented to person, place, and time.      Deep Tendon Reflexes: Reflexes  normal.   Psychiatric:         Mood and Affect: Mood normal.         Thought Content: Thought content normal.         Judgment: Judgment normal.          Recent Results (from the past 48 hour(s))   POCT glucose    Collection Time: 03/11/24 12:36 PM   Result Value Ref Range    POCT Glucose 76 70 - 110 mg/dL   POCT glucose    Collection Time: 03/11/24  5:47 PM   Result Value Ref Range    POCT Glucose 62 (L) 70 - 110 mg/dL   POCT glucose    Collection Time: 03/11/24  8:17 PM   Result Value Ref Range    POCT Glucose 109 70 - 110 mg/dL   POCT glucose    Collection Time: 03/12/24  6:01 AM   Result Value Ref Range    POCT Glucose 62 (L) 70 - 110 mg/dL   POCT glucose    Collection Time: 03/12/24  7:57 AM   Result Value Ref Range    POCT Glucose 84 70 - 110 mg/dL   POCT glucose    Collection Time: 03/12/24 10:04 AM   Result Value Ref Range    POCT Glucose 87 70 - 110 mg/dL   POCT glucose    Collection Time: 03/12/24  1:23 PM   Result Value Ref Range    POCT Glucose 44 (LL) 70 - 110 mg/dL   POCT glucose    Collection Time: 03/12/24  2:22 PM   Result Value Ref Range    POCT Glucose 70 70 - 110 mg/dL   POCT glucose    Collection Time: 03/12/24  4:39 PM   Result Value Ref Range    POCT Glucose 71 70 - 110 mg/dL   POCT glucose    Collection Time: 03/12/24  6:39 PM   Result Value Ref Range    POCT Glucose 68 (L) 70 - 110 mg/dL   AST (SGOT)    Collection Time: 03/13/24  8:58 AM   Result Value Ref Range    Aspartate Aminotransferase 27 5 - 34 unit/L   ALT (SGPT)    Collection Time: 03/13/24  8:58 AM   Result Value Ref Range    Alanine Aminotransferase 25 0 - 55 unit/L   Lactate Dehydrogenase    Collection Time: 03/13/24  8:58 AM   Result Value Ref Range    Lactate Dehydrogenase 262 (H) 125 - 220 U/L   Uric Acid    Collection Time: 03/13/24  8:58 AM   Result Value Ref Range    Uric Acid 5.5 2.6 - 6.0 mg/dL        US OB Non Rad 14+ Weeks TransAbd, w/Biophysical Profile, w/o NST Single Gestation (XPD)  See Provider Notes for results.      IMPRESSION: Please see provider notes for interpretation    This procedure was auto-finalized by: Virtual Radiologist      Plan    PPD1, s/p  with:    Chronic hypertension with worsening blood pressure consistent with superimposed preeclampsia  status post normal vaginal delivery yesterday  On 3/11/2024 and 3/13/2024, she had labs that were reassuring. She is asymptomatic. Blood pressures reviewed.  This morning, the blood pressures have been on the lower side.  We will hold off on the labetalol at this time and continue Procardia XL 60 mg daily.  Discussed with her the potential increase in blood pressure on day 2-3 postpartum, and we will plan to restart labetalol at a lower dose if needed based on ongoing assessment.    Magnesium sulfate we will be continued until 24 hours postpartum.    Patient was advised of the importance of reporting any time even after discharge if she has any headaches vision problems or epigastric pain, in view of the rare risk of late postpartum eclampsia.         Increased BMI   It is important to lose weight after the pregnancy is over, especially before a future pregnancy was discussed. Breastfeeding may be an important tool in reducing the postpartum weight retention. Fetal risks were discussed with short term risk of fetal/ obesity and long term risk of adolescent component of metabolic syndrome.      GDM, s/p   The association of gestational diabetes (50%) with adult-onset type 2 diabetes mellitus was reviewed.  The importance of losing weight after the pregnancy was emphasized, as well as doing a 75 g sugar test after postpartum exam and to have annual checkups every 1-3 years for blood sugars to make sure she does not develop diabetes.        VTE prophylaxis  Continue frequent movement of BLEs.  Advised to immediately report BLE discomfort, swelling, hotness tenderness. Expectant management    Patient was evaluated around 8:30 a.m..  We will give an order to  restart labetalol at 200 mg q.12 hours with a blood pressure systolic goes to 150 or higher.     This note was created with the assistance of [employer] voice recognition software. There may be transcription errors as a result of using this technology, however minimal. Effort has been made to assure accuracy of transcription, but any obvious errors or omissions should be clarified with the author of the document.     Patient was evaluated and examined by Dr. Alfonso. MARIBEL Shah, helped as  scribe in completion of part of note.

## 2024-03-13 NOTE — L&D DELIVERY NOTE
Delivery Note    Obstetrician:   Robbie Emanuel    Pre-Delivery Diagnosis: Term pregnancy, Worsening CHTN    Post-Delivery Diagnosis: Living  infant(s)    Procedure:  Infant delivered in the vertex presentation.  No shoulder dystacia.      Tear: none    Indications for instrumental delivery: none      Placenta and Cord:          Mechanism: spontaneous        Description:  complete    Estimated Blood Loss: less than 100            Placenta sent to path:  no           Complications:  none           Condition: stable

## 2024-03-14 VITALS
TEMPERATURE: 98 F | WEIGHT: 210 LBS | HEIGHT: 63 IN | DIASTOLIC BLOOD PRESSURE: 87 MMHG | SYSTOLIC BLOOD PRESSURE: 139 MMHG | HEART RATE: 72 BPM | BODY MASS INDEX: 37.21 KG/M2 | OXYGEN SATURATION: 99 % | RESPIRATION RATE: 20 BRPM

## 2024-03-14 PROCEDURE — 25000003 PHARM REV CODE 250: Performed by: STUDENT IN AN ORGANIZED HEALTH CARE EDUCATION/TRAINING PROGRAM

## 2024-03-14 RX ORDER — NIFEDIPINE 60 MG/1
60 TABLET, EXTENDED RELEASE ORAL DAILY
Qty: 30 TABLET | Refills: 11 | Status: SHIPPED | OUTPATIENT
Start: 2024-03-14 | End: 2025-03-14

## 2024-03-14 RX ORDER — IBUPROFEN 600 MG/1
600 TABLET ORAL 3 TIMES DAILY
Qty: 30 TABLET | Refills: 0 | Status: SHIPPED | OUTPATIENT
Start: 2024-03-14 | End: 2024-04-24

## 2024-03-14 RX ADMIN — IBUPROFEN 600 MG: 600 TABLET, FILM COATED ORAL at 05:03

## 2024-03-14 RX ADMIN — NIFEDIPINE 60 MG: 60 TABLET, FILM COATED, EXTENDED RELEASE ORAL at 07:03

## 2024-03-14 RX ADMIN — IBUPROFEN 600 MG: 600 TABLET, FILM COATED ORAL at 12:03

## 2024-03-14 NOTE — DISCHARGE SUMMARY
[facility] - 3rd Floor Mother/Baby Unit  Obstetrics  Discharge Summary      Patient Name: Tawnya Love  MRN: [id number]  Admission Date: 3/10/2024  Hospital Length of Stay: 2 days  Discharge Date and Time: No discharge date for patient encounter.  Attending Physician: Robbie Emanuel MD   Discharging Provider: Robbie Emanuel MD   Primary Care Provider: Shawna Stewart MD    HPI: 38 yr  @ 37 wk 1 d presenting to [facility] with elevatated BP's at home.  Preg complicated by CHTN and insulin managed GDM.  Pt recently had cerclage removed.  Pt states good FM, no LOF, no VB.  Pt states she did not feel well today and noted her BP's to be 170/100's at home.  Pt took extra dose of labetalol 300 mg and took a nap.  Pt states she had a HA which resolved, denies flashing lights or RUQ pain.        * No surgery found *     Hospital Course:   BP monitored and pre-e labs obtained.  All pre-e labs WNL.  Pt observed overnight. Induced for decels.   and PP care. Needed Mg post partum for severe range BP's         Final Active Diagnoses:    Diagnosis Date Noted POA    PRINCIPAL PROBLEM:  Pre-existing essential hypertension affecting pregnancy in third trimester [O10.013] 2023 Yes    Pregnancy with 37 weeks completed gestation [Z3A.37] 03/10/2024 Not Applicable    History of insulin controlled gestational diabetes mellitus (GDM) [Z86.32] 2023 Yes    At high risk for complications of intrauterine pregnancy (IUP) [O09.90] 2023 Not Applicable    AMA (advanced maternal age) multigravida 35+, third trimester [O09.523] 2023 Yes      Problems Resolved During this Admission:        Significant Diagnostic Studies:       Feeding Method: bottle    Immunizations       Date Immunization Status Dose Route/Site Given by    24 MMR Incomplete 0.5 mL Subcutaneous/     24 Tdap Incomplete 0.5 mL Intramuscular/             Delivery:    Episiotomy:     Lacerations:     Repair  "suture:     Repair # of packets:     Blood loss (ml):       Birth information:  YOB: 2022   Time of birth: 8:50 PM   Sex: female   Delivery type: Vaginal, Spontaneous   Gestational Age: 37w3d     Measurements    Weight: 3118 g  Weight (lbs): 6 lb 14 oz  Length: 48.3 cm  Length (in): 19"  Head circumference: 34.3 cm         Delivery Clinician: Delivery Providers    Delivering clinician:           Additional  information:  Forceps:    Vacuum:    Breech:    Observed anomalies      Living?:     Apgars    Living status: Living  Apgar Component Scores:  1 min.:  5 min.:  10 min.:  15 min.:  20 min.:    Skin color:  0  1       Heart rate:  2  2       Reflex irritability:  2  2       Muscle tone:  2  2       Respiratory effort:  2  2       Total:  8  9       Apgars assigned by: ROGER HAWTHORNE RN         Placenta: Delivered:       appearance  Pending Diagnostic Studies:       Procedure Component Value Units Date/Time    Creatinine, Serum [[id number]]     Order Status: Sent Lab Status: No result     Specimen: Blood             Discharged Condition: good    Disposition: Home or Self Care    Follow Up:    Patient Instructions:      Diet Adult Regular     No dressing needed     Notify your health care provider if you experience any of the following:  temperature >100.4     Notify your health care provider if you experience any of the following:  persistent nausea and vomiting or diarrhea     Notify your health care provider if you experience any of the following:  severe uncontrolled pain     Notify your health care provider if you experience any of the following:  redness, tenderness, or signs of infection (pain, swelling, redness, odor or green/yellow discharge around incision site)     Notify your health care provider if you experience any of the following:  difficulty breathing or increased cough     Notify your health care provider if you experience any of the following:  severe persistent headache     Notify your " health care provider if you experience any of the following:  worsening rash     Notify your health care provider if you experience any of the following:  persistent dizziness, light-headedness, or visual disturbances     Notify your health care provider if you experience any of the following:  increased confusion or weakness     Notify your health care provider if you experience any of the following:     Pelvic Rest     Activity as tolerated     Medications:  Current Discharge Medication List        START taking these medications    Details   ibuprofen (ADVIL,MOTRIN) 600 MG tablet Take 1 tablet (600 mg total) by mouth 3 (three) times daily.  Qty: 30 tablet, Refills: 0      NIFEdipine (PROCARDIA-XL) 60 MG (OSM) 24 hr tablet Take 1 tablet (60 mg total) by mouth once daily.  Qty: 30 tablet, Refills: 11    Comments: .           CONTINUE these medications which have NOT CHANGED    Details   prenatal vit/iron fum/folic ac (PRENATAL 1+1 ORAL) Take by mouth.           STOP taking these medications       aspirin 81 MG Chew Comments:   Reason for Stopping:         blood sugar diagnostic Strp Comments:   Reason for Stopping:         blood-glucose meter kit Comments:   Reason for Stopping:         insulin aspart U-100 (NOVOLOG) 100 unit/mL injection Comments:   Reason for Stopping:         insulin  unit/mL injection Comments:   Reason for Stopping:         insulin syringe-needle U-100 1 mL 31 gauge x 5/16 Syrg Comments:   Reason for Stopping:         labetaloL (NORMODYNE) 300 MG tablet Comments:   Reason for Stopping:         lancets 30 gauge Misc Comments:   Reason for Stopping:         blood sugar diagnostic (ONETOUCH ULTRA TEST) Strp Comments:   Reason for Stopping:         insulin aspart U-100 (NOVOLOG) 100 unit/mL injection Comments:   Reason for Stopping:         insulin  unit/mL injection Comments:   Reason for Stopping:         ONETOUCH DELICA PLUS LANCET 33 gauge Misc Comments:   Reason for Stopping:          ONETOUCH ULTRA2 METER Misc Comments:   Reason for Stopping:               Robbie Emanuel MD  Obstetrics  [facility] - 3rd Floor Mother/Baby Unit

## 2024-03-14 NOTE — PLAN OF CARE
Problem: Diabetes in Pregnancy  Goal: Blood Glucose Level Within Targeted Range  Outcome: Ongoing, Progressing     Problem:  Fall Injury Risk  Goal: Absence of Fall, Infant Drop and Related Injury  Outcome: Ongoing, Progressing     Problem: Adult Inpatient Plan of Care  Goal: Plan of Care Review  Outcome: Ongoing, Progressing  Goal: Patient-Specific Goal (Individualized)  Outcome: Ongoing, Progressing  Goal: Absence of Hospital-Acquired Illness or Injury  Outcome: Ongoing, Progressing  Goal: Optimal Comfort and Wellbeing  Outcome: Ongoing, Progressing  Goal: Readiness for Transition of Care  Outcome: Ongoing, Progressing     Problem: Infection  Goal: Absence of Infection Signs and Symptoms  Outcome: Ongoing, Progressing

## 2024-04-24 ENCOUNTER — OFFICE VISIT (OUTPATIENT)
Dept: INTERNAL MEDICINE | Facility: CLINIC | Age: 39
End: 2024-04-24
Payer: COMMERCIAL

## 2024-04-24 ENCOUNTER — TELEPHONE (OUTPATIENT)
Dept: INTERNAL MEDICINE | Facility: CLINIC | Age: 39
End: 2024-04-24

## 2024-04-24 VITALS — BODY MASS INDEX: 32.43 KG/M2 | HEART RATE: 69 BPM | WEIGHT: 183 LBS | HEIGHT: 63 IN | OXYGEN SATURATION: 98 %

## 2024-04-24 DIAGNOSIS — I10 ESSENTIAL (PRIMARY) HYPERTENSION: Primary | Chronic | ICD-10-CM

## 2024-04-24 PROCEDURE — 1160F RVW MEDS BY RX/DR IN RCRD: CPT | Mod: CPTII,,, | Performed by: INTERNAL MEDICINE

## 2024-04-24 PROCEDURE — 3008F BODY MASS INDEX DOCD: CPT | Mod: CPTII,,, | Performed by: INTERNAL MEDICINE

## 2024-04-24 PROCEDURE — 99214 OFFICE O/P EST MOD 30 MIN: CPT | Mod: ,,, | Performed by: INTERNAL MEDICINE

## 2024-04-24 PROCEDURE — 1159F MED LIST DOCD IN RCRD: CPT | Mod: CPTII,,, | Performed by: INTERNAL MEDICINE

## 2024-04-24 RX ORDER — LOSARTAN POTASSIUM AND HYDROCHLOROTHIAZIDE 12.5; 5 MG/1; MG/1
1 TABLET ORAL DAILY
Qty: 90 TABLET | Refills: 3 | Status: SHIPPED | OUTPATIENT
Start: 2024-04-24 | End: 2025-04-24

## 2024-04-24 NOTE — ASSESSMENT & PLAN NOTE
Uncontrolled  Initiate patient on losartan hydrochlorothiazide 50-12.5 mg p.o. daily  Low Sodium Diet (DASH Diet - Less than 2 grams of sodium per day).  Monitor blood pressure daily and log. Report consistent numbers greater than 140/90.  Maintain healthy weight with goal BMI <30. Exercise 30 minutes per day, 5 days per week.  RTC 3 months

## 2024-04-24 NOTE — TELEPHONE ENCOUNTER
----- Message from Jemma Langley sent at 4/24/2024  2:18 PM CDT -----  Regarding: Labs  Pt scheduled appt on 04/29 states elevated B/p and High Chol asked should she do labs before visit/ she just had a baby 6 weeks ago . please advise

## 2024-04-24 NOTE — PROGRESS NOTES
Subjective:      Patient ID: Tawnya Love is a 38 y.o. female.    Chief Complaint: Follow-up (Elevated BP and cholesterol)    Tawnya is a 38-year-old female here today for elevated blood pressures.    Six weeks postpartum from her 2nd baby.  Currently not nursing.  Blood pressure noted to be in the 160 systolic and 100 diastolic range.  On nifedipine.    Patient will be initiated back on blood pressure medications.  I will get her started on losartan hydrochlorothiazide and patient is advised to keep a blood pressure log and return to clinic in 3 months.    Low-sodium diet and some form of routine aerobic exercises emphasized          GYN: Dr. Emanuel    Cardiology: Dr. Kwan   Dermatology: Dr. Reynoso       The patient's Health Maintenance was reviewed and the following appears to be due at this time:   Health Maintenance Due   Topic Date Due    TETANUS VACCINE  2012    COVID-19 Vaccine ( season) Never done        Past Medical History:  Past Medical History:   Diagnosis Date    Essential (primary) hypertension     Mixed hyperlipidemia     Personal history of gestational diabetes      labor in second trimester with  delivery in second trimester     Thyroid disease     Well adult exam      Past Surgical History:   Procedure Laterality Date    CERVICAL CERCLAGE N/A 2022    DILATION AND CURETTAGE OF UTERUS       Review of patient's allergies indicates:  No Known Allergies  Social History     Socioeconomic History    Marital status:    Tobacco Use    Smoking status: Never     Passive exposure: Never    Smokeless tobacco: Never   Substance and Sexual Activity    Alcohol use: Not Currently    Drug use: Never    Sexual activity: Not Currently     Partners: Male     Birth control/protection: None     Family History   Problem Relation Name Age of Onset    No Known Problems Mother      Hypertension Father Manpreet     Hyperlipidemia Father Manpreet        Review of Systems    A  "comprehensive review of systems was performed and is negative except for that stated above  Objective:   Pulse 69   Ht 5' 3" (1.6 m)   Wt 83 kg (183 lb)   LMP  (LMP Unknown)   SpO2 98%   Breastfeeding No   BMI 32.42 kg/m²     Physical Exam  Constitutional:       Appearance: Normal appearance. She is obese.   HENT:      Head: Normocephalic and atraumatic.      Nose: Nose normal.      Mouth/Throat:      Mouth: Mucous membranes are moist.      Pharynx: Oropharynx is clear.   Eyes:      Extraocular Movements: Extraocular movements intact.      Pupils: Pupils are equal, round, and reactive to light.   Cardiovascular:      Rate and Rhythm: Normal rate and regular rhythm.      Pulses: Normal pulses.   Pulmonary:      Effort: Pulmonary effort is normal.      Breath sounds: Normal breath sounds.   Abdominal:      General: Bowel sounds are normal.      Palpations: Abdomen is soft.   Musculoskeletal:         General: Normal range of motion.      Cervical back: Normal range of motion and neck supple.   Skin:     General: Skin is warm.   Neurological:      General: No focal deficit present.      Mental Status: She is alert and oriented to person, place, and time. Mental status is at baseline.   Psychiatric:         Mood and Affect: Mood normal.       Assessment/ Plan:   1. Essential (primary) hypertension  Assessment & Plan:  Uncontrolled  Initiate patient on losartan hydrochlorothiazide 50-12.5 mg p.o. daily  Low Sodium Diet (DASH Diet - Less than 2 grams of sodium per day).  Monitor blood pressure daily and log. Report consistent numbers greater than 140/90.  Maintain healthy weight with goal BMI <30. Exercise 30 minutes per day, 5 days per week.  RTC 3 months           Other orders  -     losartan-hydrochlorothiazide 50-12.5 mg (HYZAAR) 50-12.5 mg per tablet; Take 1 tablet by mouth once daily.  Dispense: 90 tablet; Refill: 3       "

## 2024-11-07 DIAGNOSIS — I10 ESSENTIAL (PRIMARY) HYPERTENSION: Primary | ICD-10-CM

## 2024-11-07 DIAGNOSIS — Z00.00 WELLNESS EXAMINATION: ICD-10-CM

## 2024-11-07 DIAGNOSIS — E78.2 MIXED HYPERLIPIDEMIA: ICD-10-CM

## 2024-11-07 DIAGNOSIS — E56.9 VITAMIN DEFICIENCY: ICD-10-CM

## 2024-11-25 ENCOUNTER — TELEPHONE (OUTPATIENT)
Dept: INTERNAL MEDICINE | Facility: CLINIC | Age: 39
End: 2024-11-25
Payer: COMMERCIAL

## 2024-11-25 NOTE — TELEPHONE ENCOUNTER
----- Message from Med Assistant Sandhu sent at 11/7/2024 11:38 AM CST -----  Regarding: PV Monday 12-2-24  Wellness Appointment    Fasting wellness labs ordered and ready to do.     Last Wellness 11-29-23

## 2024-11-26 ENCOUNTER — LAB VISIT (OUTPATIENT)
Dept: LAB | Facility: HOSPITAL | Age: 39
End: 2024-11-26
Attending: INTERNAL MEDICINE
Payer: COMMERCIAL

## 2024-11-26 DIAGNOSIS — E56.9 VITAMIN DEFICIENCY: ICD-10-CM

## 2024-11-26 DIAGNOSIS — E78.2 MIXED HYPERLIPIDEMIA: ICD-10-CM

## 2024-11-26 DIAGNOSIS — Z00.00 WELLNESS EXAMINATION: ICD-10-CM

## 2024-11-26 DIAGNOSIS — I10 ESSENTIAL (PRIMARY) HYPERTENSION: ICD-10-CM

## 2024-11-26 LAB
25(OH)D3+25(OH)D2 SERPL-MCNC: 19 NG/ML (ref 30–80)
ALBUMIN SERPL-MCNC: 4.1 G/DL (ref 3.5–5)
ALBUMIN/GLOB SERPL: 1.4 RATIO (ref 1.1–2)
ALP SERPL-CCNC: 79 UNIT/L (ref 40–150)
ALT SERPL-CCNC: 11 UNIT/L (ref 0–55)
ANION GAP SERPL CALC-SCNC: 7 MEQ/L
AST SERPL-CCNC: 12 UNIT/L (ref 5–34)
BASOPHILS # BLD AUTO: 0.02 X10(3)/MCL
BASOPHILS NFR BLD AUTO: 0.4 %
BILIRUB SERPL-MCNC: 0.5 MG/DL
BUN SERPL-MCNC: 11.2 MG/DL (ref 7–18.7)
CALCIUM SERPL-MCNC: 9.2 MG/DL (ref 8.4–10.2)
CHLORIDE SERPL-SCNC: 105 MMOL/L (ref 98–107)
CHOLEST SERPL-MCNC: 246 MG/DL
CHOLEST/HDLC SERPL: 7 {RATIO} (ref 0–5)
CO2 SERPL-SCNC: 28 MMOL/L (ref 22–29)
CREAT SERPL-MCNC: 0.83 MG/DL (ref 0.55–1.02)
CREAT/UREA NIT SERPL: 13
EOSINOPHIL # BLD AUTO: 0.13 X10(3)/MCL (ref 0–0.9)
EOSINOPHIL NFR BLD AUTO: 2.4 %
ERYTHROCYTE [DISTWIDTH] IN BLOOD BY AUTOMATED COUNT: 13.2 % (ref 11.5–17)
EST. AVERAGE GLUCOSE BLD GHB EST-MCNC: 102.5 MG/DL
GFR SERPLBLD CREATININE-BSD FMLA CKD-EPI: >60 ML/MIN/1.73/M2
GLOBULIN SER-MCNC: 3 GM/DL (ref 2.4–3.5)
GLUCOSE SERPL-MCNC: 91 MG/DL (ref 74–100)
HBA1C MFR BLD: 5.2 %
HCT VFR BLD AUTO: 39.4 % (ref 37–47)
HDLC SERPL-MCNC: 37 MG/DL (ref 35–60)
HGB BLD-MCNC: 12.8 G/DL (ref 12–16)
IMM GRANULOCYTES # BLD AUTO: 0.01 X10(3)/MCL (ref 0–0.04)
IMM GRANULOCYTES NFR BLD AUTO: 0.2 %
LDLC SERPL CALC-MCNC: 173 MG/DL (ref 50–140)
LYMPHOCYTES # BLD AUTO: 2 X10(3)/MCL (ref 0.6–4.6)
LYMPHOCYTES NFR BLD AUTO: 37.6 %
MCH RBC QN AUTO: 29 PG (ref 27–31)
MCHC RBC AUTO-ENTMCNC: 32.5 G/DL (ref 33–36)
MCV RBC AUTO: 89.3 FL (ref 80–94)
MONOCYTES # BLD AUTO: 0.47 X10(3)/MCL (ref 0.1–1.3)
MONOCYTES NFR BLD AUTO: 8.8 %
NEUTROPHILS # BLD AUTO: 2.69 X10(3)/MCL (ref 2.1–9.2)
NEUTROPHILS NFR BLD AUTO: 50.6 %
NRBC BLD AUTO-RTO: 0 %
PLATELET # BLD AUTO: 283 X10(3)/MCL (ref 130–400)
PMV BLD AUTO: 10.5 FL (ref 7.4–10.4)
POTASSIUM SERPL-SCNC: 4.2 MMOL/L (ref 3.5–5.1)
PROT SERPL-MCNC: 7.1 GM/DL (ref 6.4–8.3)
RBC # BLD AUTO: 4.41 X10(6)/MCL (ref 4.2–5.4)
SODIUM SERPL-SCNC: 140 MMOL/L (ref 136–145)
TRIGL SERPL-MCNC: 180 MG/DL (ref 37–140)
TSH SERPL-ACNC: 0.98 UIU/ML (ref 0.35–4.94)
VLDLC SERPL CALC-MCNC: 36 MG/DL
WBC # BLD AUTO: 5.32 X10(3)/MCL (ref 4.5–11.5)

## 2024-11-26 PROCEDURE — 82306 VITAMIN D 25 HYDROXY: CPT

## 2024-11-26 PROCEDURE — 84443 ASSAY THYROID STIM HORMONE: CPT

## 2024-11-26 PROCEDURE — 80053 COMPREHEN METABOLIC PANEL: CPT

## 2024-11-26 PROCEDURE — 36415 COLL VENOUS BLD VENIPUNCTURE: CPT

## 2024-11-26 PROCEDURE — 80061 LIPID PANEL: CPT

## 2024-11-26 PROCEDURE — 83036 HEMOGLOBIN GLYCOSYLATED A1C: CPT

## 2024-11-26 PROCEDURE — 85025 COMPLETE CBC W/AUTO DIFF WBC: CPT

## 2024-12-02 ENCOUNTER — OFFICE VISIT (OUTPATIENT)
Dept: INTERNAL MEDICINE | Facility: CLINIC | Age: 39
End: 2024-12-02
Payer: COMMERCIAL

## 2024-12-02 VITALS
HEIGHT: 63 IN | WEIGHT: 190 LBS | OXYGEN SATURATION: 97 % | HEART RATE: 107 BPM | BODY MASS INDEX: 33.66 KG/M2 | DIASTOLIC BLOOD PRESSURE: 88 MMHG | SYSTOLIC BLOOD PRESSURE: 138 MMHG

## 2024-12-02 DIAGNOSIS — Z00.00 WELLNESS EXAMINATION: Primary | ICD-10-CM

## 2024-12-02 DIAGNOSIS — E78.2 MIXED HYPERLIPIDEMIA: Chronic | ICD-10-CM

## 2024-12-02 DIAGNOSIS — I10 ESSENTIAL (PRIMARY) HYPERTENSION: Chronic | ICD-10-CM

## 2024-12-02 DIAGNOSIS — E66.811 OBESITY (BMI 30.0-34.9): ICD-10-CM

## 2024-12-02 PROCEDURE — 3044F HG A1C LEVEL LT 7.0%: CPT | Mod: CPTII,,, | Performed by: INTERNAL MEDICINE

## 2024-12-02 PROCEDURE — 99395 PREV VISIT EST AGE 18-39: CPT | Mod: ,,, | Performed by: INTERNAL MEDICINE

## 2024-12-02 PROCEDURE — 99213 OFFICE O/P EST LOW 20 MIN: CPT | Mod: 25,,, | Performed by: INTERNAL MEDICINE

## 2024-12-02 PROCEDURE — 3079F DIAST BP 80-89 MM HG: CPT | Mod: CPTII,,, | Performed by: INTERNAL MEDICINE

## 2024-12-02 PROCEDURE — 3008F BODY MASS INDEX DOCD: CPT | Mod: CPTII,,, | Performed by: INTERNAL MEDICINE

## 2024-12-02 PROCEDURE — 3075F SYST BP GE 130 - 139MM HG: CPT | Mod: CPTII,,, | Performed by: INTERNAL MEDICINE

## 2024-12-02 PROCEDURE — 1160F RVW MEDS BY RX/DR IN RCRD: CPT | Mod: CPTII,,, | Performed by: INTERNAL MEDICINE

## 2024-12-02 PROCEDURE — 1159F MED LIST DOCD IN RCRD: CPT | Mod: CPTII,,, | Performed by: INTERNAL MEDICINE

## 2024-12-02 RX ORDER — LABETALOL 200 MG/1
200 TABLET, FILM COATED ORAL 2 TIMES DAILY
Qty: 60 TABLET | Refills: 11 | Status: SHIPPED | OUTPATIENT
Start: 2024-12-02 | End: 2025-12-02

## 2024-12-02 RX ORDER — LOSARTAN POTASSIUM AND HYDROCHLOROTHIAZIDE 12.5; 1 MG/1; MG/1
1 TABLET ORAL DAILY
Qty: 90 TABLET | Refills: 3 | Status: SHIPPED | OUTPATIENT
Start: 2024-12-02 | End: 2024-12-02

## 2024-12-02 NOTE — ASSESSMENT & PLAN NOTE
Not initiating on statin since that is still a possibility patient could get pregnant, once she sure about not wanting anymore pregnancy, may consider statin if not improve with lifestyle changes  Stressed importance of dietary modifications. Follow a low cholesterol, low saturated fat diet with less that 200mg of cholesterol a day.  Avoid fried foods and high saturated fats (high saturated fats less than 7% of calories).  Add Flax Seed/Fish Oil supplements to diet. Increase dietary fiber.  Regular exercise can reduce LDL and raise HDL. Stressed importance of physical activity 5 times per week for 30 minutes per day.

## 2024-12-02 NOTE — ASSESSMENT & PLAN NOTE
Improved  Initiate patient on labetalol 200 mg p.o. b.i.d., taking her off of the losartan hydrochlorothiazide since there is still a chance that patient could possibly get pregnant as she is not on any birth control.  Low Sodium Diet (DASH Diet - Less than 2 grams of sodium per day).  Monitor blood pressure daily and log. Report consistent numbers greater than 140/90.  Maintain healthy weight with goal BMI <30. Exercise 30 minutes per day, 5 days per week.

## 2024-12-02 NOTE — ASSESSMENT & PLAN NOTE
-labs are reviewed all essentially normal except for elevated total cholesterol, LDL and triglyceride  -patient is advised on importance of watching her carbohydrate intake and saturated fat intake, making the right nutritional choices and exercising on a regular basis  -up-to-date with the screening

## 2024-12-02 NOTE — PROGRESS NOTES
Subjective:      Patient ID: Tawnya Love is a 39 y.o. female.    Chief Complaint: Annual Exam (Wellness/)    Tawnya is a 39 year-old female here today for her wellness visit and follow up for HTN.  Doing well, baby is 8-month-old.  Currently not preventing pregnancy, not on birth control.  We discussed possibly switching from losartan hydrochlorothiazide to labetalol in case an accidental pregnancy occurs and she is in agreement.    Emphasized on importance of watching diet, exercising regularly and losing weight.       GYN: Dr. Emanuel    Cardiology: Dr. Kwan   Dermatology: Dr. Reynoso       The patient's Health Maintenance was reviewed and the following appears to be due at this time:   Health Maintenance Due   Topic Date Due    TETANUS VACCINE  2012    COVID-19 Vaccine ( season) Never done        Past Medical History:  Past Medical History:   Diagnosis Date    Essential (primary) hypertension     Mixed hyperlipidemia     Personal history of gestational diabetes      labor in second trimester with  delivery in second trimester     Thyroid disease     Well adult exam      Past Surgical History:   Procedure Laterality Date    CERVICAL CERCLAGE N/A 2022    DILATION AND CURETTAGE OF UTERUS       Review of patient's allergies indicates:  No Known Allergies  Social History     Socioeconomic History    Marital status:    Tobacco Use    Smoking status: Never     Passive exposure: Never    Smokeless tobacco: Never   Substance and Sexual Activity    Alcohol use: Not Currently    Drug use: Never    Sexual activity: Not Currently     Partners: Male     Birth control/protection: None     Social Drivers of Health     Financial Resource Strain: Low Risk  (2024)    Overall Financial Resource Strain (CARDIA)     Difficulty of Paying Living Expenses: Not very hard   Food Insecurity: No Food Insecurity (2024)    Hunger Vital Sign     Worried About Running Out of Food in the  "Last Year: Never true     Ran Out of Food in the Last Year: Never true   Transportation Needs: No Transportation Needs (12/2/2024)    TRANSPORTATION NEEDS     Transportation : No   Physical Activity: Insufficiently Active (12/2/2024)    Exercise Vital Sign     Days of Exercise per Week: 3 days     Minutes of Exercise per Session: 30 min   Stress: No Stress Concern Present (12/2/2024)    [place] [facility] [employer] - Occupational Stress Questionnaire     Feeling of Stress : Not at all   Housing Stability: Low Risk  (12/2/2024)    Housing Stability Vital Sign     Unable to Pay for Housing in the Last Year: No     Homeless in the Last Year: No     Family History   Problem Relation Name Age of Onset    No Known Problems Mother      Hypertension Father Manpreet     Hyperlipidemia Father Manpreet        Review of Systems    A comprehensive review of systems was performed and is negative except for that stated above  Objective:   /88 (BP Location: Right arm, Patient Position: Sitting)   Pulse 107   Ht 5' 3" (1.6 m)   Wt 86.2 kg (190 lb)   LMP 10/14/2024 (Exact Date)   SpO2 97%   Breastfeeding No   BMI 33.66 kg/m²     Physical Exam  Constitutional:       Appearance: Normal appearance. She is obese.   HENT:      Head: Normocephalic and atraumatic.      Nose: Nose normal.      Mouth/Throat:      Mouth: Mucous membranes are moist.      Pharynx: Oropharynx is clear.   Eyes:      Extraocular Movements: Extraocular movements intact.      Pupils: Pupils are equal, round, and reactive to light.   Cardiovascular:      Rate and Rhythm: Normal rate and regular rhythm.      Pulses: Normal pulses.   Pulmonary:      Effort: Pulmonary effort is normal.      Breath sounds: Normal breath sounds.   Abdominal:      General: Bowel sounds are normal.      Palpations: Abdomen is soft.   Musculoskeletal:         General: Normal range of motion.      Cervical back: Normal range of motion and neck supple.   Skin:     General: " Skin is warm.   Neurological:      General: No focal deficit present.      Mental Status: She is alert and oriented to person, place, and time. Mental status is at baseline.   Psychiatric:         Mood and Affect: Mood normal.       Assessment/ Plan:   1. Wellness examination  Assessment & Plan:  -labs are reviewed all essentially normal except for elevated total cholesterol, LDL and triglyceride  -patient is advised on importance of watching her carbohydrate intake and saturated fat intake, making the right nutritional choices and exercising on a regular basis  -up-to-date with the screening       2. Obesity (BMI 30.0-34.9)  Overview:  Body mass index is 33.66 kg/m².  Goal BMI <30.  Exercise 5 times a week for 30 minutes per day.  Avoid soda, simple sugars, excessive rice, potatoes or bread. Limit fast foods and fried foods.  Choose complex carbs in moderation (example: green vegetables, beans, oatmeal). Eat plenty of fresh fruits and vegetables with lean meats daily.  Do not skip meals. Eat a balanced portion size.  Avoid fad diets. Consider permanent healthy life style changes.        3. Essential (primary) hypertension  Assessment & Plan:  Improved  Initiate patient on labetalol 200 mg p.o. b.i.d., taking her off of the losartan hydrochlorothiazide since there is still a chance that patient could possibly get pregnant as she is not on any birth control.  Low Sodium Diet (DASH Diet - Less than 2 grams of sodium per day).  Monitor blood pressure daily and log. Report consistent numbers greater than 140/90.  Maintain healthy weight with goal BMI <30. Exercise 30 minutes per day, 5 days per week.           4. Mixed hyperlipidemia  Assessment & Plan:  Not initiating on statin since that is still a possibility patient could get pregnant, once she sure about not wanting anymore pregnancy, may consider statin if not improve with lifestyle changes  Stressed importance of dietary modifications. Follow a low cholesterol,  low saturated fat diet with less that 200mg of cholesterol a day.  Avoid fried foods and high saturated fats (high saturated fats less than 7% of calories).  Add Flax Seed/Fish Oil supplements to diet. Increase dietary fiber.  Regular exercise can reduce LDL and raise HDL. Stressed importance of physical activity 5 times per week for 30 minutes per day.        Other orders  -     Discontinue: losartan-hydrochlorothiazide 100-12.5 mg (HYZAAR) 100-12.5 mg Tab; Take 1 tablet by mouth once daily.  Dispense: 90 tablet; Refill: 3  -     labetaloL (NORMODYNE) 200 MG tablet; Take 1 tablet (200 mg total) by mouth 2 (two) times daily.  Dispense: 60 tablet; Refill: 11

## 2024-12-30 PROBLEM — O09.292 HISTORY OF CERCLAGE, CURRENTLY PREGNANT, SECOND TRIMESTER: Status: RESOLVED | Noted: 2023-09-18 | Resolved: 2024-12-30

## 2024-12-30 PROBLEM — O09.293 HX OF PREECLAMPSIA, PRIOR PREGNANCY, CURRENTLY PREGNANT, THIRD TRIMESTER: Status: RESOLVED | Noted: 2023-09-18 | Resolved: 2024-12-30

## 2024-12-30 PROBLEM — Z98.890 HISTORY OF CERCLAGE, CURRENTLY PREGNANT, SECOND TRIMESTER: Status: RESOLVED | Noted: 2023-09-18 | Resolved: 2024-12-30

## 2025-01-24 ENCOUNTER — TELEPHONE (OUTPATIENT)
Dept: INTERNAL MEDICINE | Facility: CLINIC | Age: 40
End: 2025-01-24
Payer: COMMERCIAL

## 2025-01-24 DIAGNOSIS — I10 ESSENTIAL (PRIMARY) HYPERTENSION: Primary | ICD-10-CM

## 2025-01-24 RX ORDER — LABETALOL 200 MG/1
200 TABLET, FILM COATED ORAL 3 TIMES DAILY
Qty: 90 TABLET | Refills: 11 | Status: SHIPPED | OUTPATIENT
Start: 2025-01-24 | End: 2026-01-24

## 2025-01-24 NOTE — TELEPHONE ENCOUNTER
Spoke with Pt, pt stated her BP has been running around 160/100 before meds, and then around 140/90 after meds. Pt is currently on labetalol HCl 200 mg Oral 2 times daily. Please advise is Pt requires med adjustments, or a OV thank you

## 2025-01-24 NOTE — TELEPHONE ENCOUNTER
----- Message from [employer] sent at 1/17/2025 11:55 AM CST -----  Regarding: med advice  Who Called: Tawnya Love    Caller is requesting assistance/information from provider's office.    Symptoms (please be specific):    How long has patient had these symptoms:    List of preferred pharmacies on file (remove uneeded): [unfilled]  If different, enter pharmacy into here including location and phone number:       Preferred Method of Contact: Phone Call  Patient's Preferred Phone Number on File: [phone]   Best Call Back Number, if different:  Additional Information: pt is requesting a call back, states her BP was 166/119. Would like to know if adjustments may need to be made to BP meds

## 2025-01-24 NOTE — TELEPHONE ENCOUNTER
----- Message from [employer] sent at 1/17/2025 11:55 AM CST -----  Regarding: med advice  Who Called: Tawnya Love    Caller is requesting assistance/information from provider's office.    Symptoms (please be specific):    How long has patient had these symptoms:    List of preferred pharmacies on file (remove uneeded): [unfilled]  If different, enter pharmacy into here including location and phone number:       Preferred Method of Contact: Phone Call  Patient's Preferred Phone Number on File: [phone]   Best Call Back Number, if different:  Additional Information: pt is requesting a call back, states her BP was 166/119. Would like to know if adjustments may need to be made to BP meds

## 2025-01-24 NOTE — TELEPHONE ENCOUNTER
Per Dr Stewart    Please have patient increase the labetalol to 200 mg 3 times a day.  Okay to send new prescription for this.  Have her maintain a blood pressure log and call on Monday if persistently elevated

## 2025-02-17 PROBLEM — Z3A.37 PREGNANCY WITH 37 WEEKS COMPLETED GESTATION: Status: RESOLVED | Noted: 2024-03-10 | Resolved: 2025-02-17

## 2025-02-18 ENCOUNTER — TELEPHONE (OUTPATIENT)
Dept: INTERNAL MEDICINE | Facility: CLINIC | Age: 40
End: 2025-02-18
Payer: COMMERCIAL

## 2025-02-18 ENCOUNTER — TELEPHONE (OUTPATIENT)
Dept: INTERNAL MEDICINE | Facility: CLINIC | Age: 40
End: 2025-02-18

## 2025-02-18 ENCOUNTER — OFFICE VISIT (OUTPATIENT)
Dept: INTERNAL MEDICINE | Facility: CLINIC | Age: 40
End: 2025-02-18
Payer: COMMERCIAL

## 2025-02-18 VITALS
DIASTOLIC BLOOD PRESSURE: 88 MMHG | HEART RATE: 81 BPM | HEIGHT: 63 IN | WEIGHT: 191 LBS | SYSTOLIC BLOOD PRESSURE: 134 MMHG | OXYGEN SATURATION: 99 % | BODY MASS INDEX: 33.84 KG/M2

## 2025-02-18 DIAGNOSIS — I10 ESSENTIAL (PRIMARY) HYPERTENSION: Primary | Chronic | ICD-10-CM

## 2025-02-18 RX ORDER — NIFEDIPINE 30 MG/1
30 TABLET, EXTENDED RELEASE ORAL DAILY
Qty: 30 TABLET | Refills: 11 | Status: SHIPPED | OUTPATIENT
Start: 2025-02-18 | End: 2026-02-18

## 2025-02-18 NOTE — PROGRESS NOTES
Internal Medicine      Patient Name:  Tawnya Love  Patient ID:  [id number]    Chief Complaint:  Hypertension      Tawnya Love is a 39 y.o. female, known to Dr Stewart, is here today for requested visit.  Medical comorbidities include HTN, HLD, obesity.    History of Present Illness    CHIEF COMPLAINT:  Ms. Love presents today for follow up of blood pressure management.    HYPERTENSION:  She reports consistently elevated home blood pressure readings in the 140s-150s/90s-100s. She notes a discrepancy between home cuff readings and manual measurements, with home cuff readings approximately 30 points higher than manual readings (manual reading in office 130/90 vs home cuff reading 166/111). Her  recently confirmed elevated readings with manual check. She experiences occasional daytime headaches and occasional palpitations when blood pressure is elevated. She denies chest pain, shortness of breath with exertion, or swelling.    MEDICATIONS:  She previously achieved good blood pressure control with Losartan-HCTZ.  She was switched to labetalol in  - initially she was taking 200 mg b.i.d..  She currently takes Labetalol 200mg 3 times daily.        Last AWV:  2024       Past Medical History:   Diagnosis Date    Essential (primary) hypertension     Mixed hyperlipidemia     Personal history of gestational diabetes      labor in second trimester with  delivery in second trimester     Thyroid disease     Well adult exam         Past Surgical History:   Procedure Laterality Date    CERVICAL CERCLAGE N/A 2022    DILATION AND CURETTAGE OF UTERUS          Social History     Tobacco Use    Smoking status: Never     Passive exposure: Never    Smokeless tobacco: Never   Substance and Sexual Activity    Alcohol use: Not Currently    Drug use: Never    Sexual activity: Not Currently     Partners: Male     Birth control/protection: None        Current Outpatient Medications  "  Medication Instructions    labetaloL (NORMODYNE) 200 mg, Oral, 3 times daily    NIFEdipine (PROCARDIA-XL) 30 mg, Oral, Daily       Review of patient's allergies indicates:  No Known Allergies     Patient Care Team:  Shawna Stewart MD as PCP - General (Internal Medicine)  Robbie Emanuel MD as OB/GYN (Obstetrics and Gynecology)  Tobias Alfonso MD as Consulting Physician (Maternal and Fetal Medicine)       Subjective:    Review of Systems   Constitutional:  Negative for appetite change, chills, diaphoresis and fever.   HENT:  Negative for congestion, ear pain, sinus pain and sore throat.    Eyes:  Negative for pain and visual disturbance.   Respiratory:  Negative for cough, shortness of breath and wheezing.    Cardiovascular:  Negative for chest pain, palpitations and leg swelling.   Gastrointestinal:  Negative for abdominal pain, constipation, diarrhea, nausea and vomiting.   Endocrine: Negative for cold intolerance.   Genitourinary:  Negative for difficulty urinating, dysuria, frequency and hematuria.   Musculoskeletal:  Negative for arthralgias and myalgias.   Skin:  Negative for color change and rash.   Allergic/Immunologic: Negative.    Neurological:  Positive for headaches (occ). Negative for dizziness, syncope, light-headedness and numbness.   Hematological: Negative.    Psychiatric/Behavioral: Negative.  Negative for dysphoric mood. The patient is not nervous/anxious.    All other systems reviewed and are negative.      Objective:    Visit Vitals  /88 (BP Location: Left arm, Patient Position: Sitting)   Pulse 81   Ht 5' 3" (1.6 m)   Wt 86.6 kg (191 lb)   SpO2 99%   BMI 33.83 kg/m²       Physical Exam  Vitals and nursing note reviewed.   Constitutional:       General: She is not in acute distress.     Appearance: She is obese. She is not ill-appearing.   HENT:      Head: Normocephalic and atraumatic.      Mouth/Throat:      Mouth: Mucous membranes are moist.      Pharynx: Oropharynx is " clear.   Eyes:      General: No scleral icterus.     Extraocular Movements: Extraocular movements intact.      Conjunctiva/sclera: Conjunctivae normal.      Pupils: Pupils are equal, round, and reactive to light.   Neck:      Vascular: No carotid bruit.   Cardiovascular:      Rate and Rhythm: Normal rate and regular rhythm.      Heart sounds: No murmur heard.     No friction rub. No gallop.   Pulmonary:      Effort: Pulmonary effort is normal. No respiratory distress.      Breath sounds: Normal breath sounds. No wheezing, rhonchi or rales.   Abdominal:      General: Bowel sounds are normal. There is no distension.      Palpations: Abdomen is soft. There is no mass.      Tenderness: There is no abdominal tenderness.   Musculoskeletal:         General: Normal range of motion.      Cervical back: Normal range of motion and neck supple.   Lymphadenopathy:      Cervical: No cervical adenopathy.   Skin:     General: Skin is warm and dry.      Capillary Refill: Capillary refill takes less than 2 seconds.   Neurological:      General: No focal deficit present.      Mental Status: She is alert and oriented to person, place, and time.   Psychiatric:         Mood and Affect: Mood normal.         Behavior: Behavior normal.         Labs Reviewed:    Chemistry:  Lab Results   Component Value Date     11/26/2024    K 4.2 11/26/2024    BUN 11.2 11/26/2024    CREATININE 0.83 11/26/2024    EGFRNORACEVR >60 11/26/2024    GLUCOSE 91 11/26/2024    CALCIUM 9.2 11/26/2024    ALKPHOS 79 11/26/2024    LABPROT 7.1 11/26/2024    ALBUMIN 4.1 11/26/2024    BILIDIR 0.2 10/29/2021    IBILI 0.30 10/29/2021    AST 12 11/26/2024    ALT 11 11/26/2024    OGBGQFQE43GW 19 (L) 11/26/2024    TSH 0.979 11/26/2024    LLIXUW1NBHJ 1.05 01/28/2019        Lab Results   Component Value Date    HGBA1C 5.2 11/26/2024        Hematology:  Lab Results   Component Value Date    WBC 5.32 11/26/2024    HGB 12.8 11/26/2024    HCT 39.4 11/26/2024      11/26/2024       Lipid Panel:  Lab Results   Component Value Date    CHOL 246 (H) 11/26/2024    HDL 37 11/26/2024    .00 (H) 11/26/2024    TRIG 180 (H) 11/26/2024    TOTALCHOLEST 7 (H) 11/26/2024        Urine:  Lab Results   Component Value Date    APPEARANCEUA Clear 05/08/2022    SGUA 1.009 05/08/2022    PROTEINUA Negative 05/08/2022    KETONESUA Negative 05/08/2022    LEUKOCYTESUR Negative 05/08/2022    RBCUA None Seen 05/08/2022    WBCUA None Seen 05/08/2022    BACTERIA Rare 05/08/2022    CREATRANDUR 17.8 (L) 03/13/2024    PROTEINURINE <6.8 03/13/2024    UPROTCREA 0.1 03/10/2024          Assessment:      ICD-10-CM ICD-9-CM   1. Essential (primary) hypertension  I10 401.9          Plan:    1. Essential (primary) hypertension  Assessment & Plan:  Above goal.  Home blood pressure cuff 20-30 points higher than in-office cuff.    Continue labetalol 200 mg t.i.d.   Add nifedipine 30 mg daily  Encouraged strict compliance with low-sodium diet.  Continue to monitor blood pressure regularly at home.  She will utilize automatic cuff at home along with having her , who is an LPN, check her blood pressure with manual cuff.  She was instructed to bring blood pressure cuff and blood pressure log to follow up visit.    Orders:  -     NIFEdipine (PROCARDIA-XL) 30 MG (OSM) 24 hr tablet; Take 1 tablet (30 mg total) by mouth once daily.  Dispense: 30 tablet; Refill: 11        Follow up in about 2 weeks (around 3/4/2025) for blood pressure check. In addition to their scheduled follow up, the patient has also been instructed to follow up on as needed basis.       Future Appointments   Date Time Provider Department Center   3/5/2025  1:00 PM Evelyn Saez FNP [facility] [place] [id number]   6/2/2025  1:00 PM Burt Romano FNP [facility] [place] [id number]   12/3/2025  1:20 PM Shawna Stewart MD [facility] [place] [id number]          [facility]      Disclaimer:  This note was generated with the assistance of  ambient listening technology. Verbal consent was obtained by the patient and accompanying visitor(s) for the recording of patient appointment to facilitate this note. I attest to having reviewed and edited the generated note for accuracy, though some syntax or spelling errors may persist. Please contact the author of this note for any clarification.

## 2025-02-18 NOTE — TELEPHONE ENCOUNTER
Spoke with pt regarding symptoms pt is experiencing elevated BP before pt took her medicine pt stated her BP was 159/110 and after taking her BP medicine her BP was 144/99. Assisted pt with scheduling appt to be sen for elevated BP

## 2025-02-18 NOTE — TELEPHONE ENCOUNTER
----- Message from [employer] sent at 2/18/2025 11:56 AM CST -----  .Who Called: Tawnya Alex is returning phone callWho Left Message for Patient: Carmina the patient know what this is regarding?: Blood PressurePreferred Method of Contact: Phone CallPatient's Preferred Phone Number on File: [phone] Best Call Back Number, if different:Additional Information: Returning missed call. Please advise, thank you.

## 2025-02-18 NOTE — ASSESSMENT & PLAN NOTE
Above goal.  Home blood pressure cuff 20-30 points higher than in-office cuff.    Continue labetalol 200 mg t.i.d.   Add nifedipine 30 mg daily  Encouraged strict compliance with low-sodium diet.  Continue to monitor blood pressure regularly at home.  She will utilize automatic cuff at home along with having her , who is an LPN, check her blood pressure with manual cuff.  She was instructed to bring blood pressure cuff and blood pressure log to follow up visit.

## 2025-02-18 NOTE — TELEPHONE ENCOUNTER
----- Message from [employer] sent at 2/18/2025  9:44 AM CST -----  .Type:  Patient Returning CallWho Called:PTWho Left Message for Patient:PTDoes the patient know what this is regarding?:BPWould the patient rather a call back or a response via [facility]? [facility] Call Back Number:[phone] Information: Please call back. Patient calling about BP being elevated it is 159/110 called backline for nurse but was told nurse was not available

## 2025-02-25 ENCOUNTER — TELEPHONE (OUTPATIENT)
Dept: INTERNAL MEDICINE | Facility: CLINIC | Age: 40
End: 2025-02-25
Payer: COMMERCIAL

## 2025-02-25 NOTE — TELEPHONE ENCOUNTER
----- Message from Med Assistant Melchor sent at 2/19/2025  3:25 PM CST -----  Regarding: previsit  Please contact pt about appointment on 03/05/2025 @8:20; with labs

## 2025-03-05 ENCOUNTER — OFFICE VISIT (OUTPATIENT)
Dept: INTERNAL MEDICINE | Facility: CLINIC | Age: 40
End: 2025-03-05
Payer: COMMERCIAL

## 2025-03-05 VITALS
SYSTOLIC BLOOD PRESSURE: 120 MMHG | HEART RATE: 71 BPM | BODY MASS INDEX: 34.02 KG/M2 | OXYGEN SATURATION: 98 % | DIASTOLIC BLOOD PRESSURE: 78 MMHG | HEIGHT: 63 IN | WEIGHT: 192 LBS

## 2025-03-05 DIAGNOSIS — I10 ESSENTIAL (PRIMARY) HYPERTENSION: Primary | Chronic | ICD-10-CM

## 2025-03-05 PROCEDURE — 1160F RVW MEDS BY RX/DR IN RCRD: CPT | Mod: CPTII,,, | Performed by: NURSE PRACTITIONER

## 2025-03-05 PROCEDURE — 99213 OFFICE O/P EST LOW 20 MIN: CPT | Mod: ,,, | Performed by: NURSE PRACTITIONER

## 2025-03-05 PROCEDURE — 3008F BODY MASS INDEX DOCD: CPT | Mod: CPTII,,, | Performed by: NURSE PRACTITIONER

## 2025-03-05 PROCEDURE — 3074F SYST BP LT 130 MM HG: CPT | Mod: CPTII,,, | Performed by: NURSE PRACTITIONER

## 2025-03-05 PROCEDURE — 3078F DIAST BP <80 MM HG: CPT | Mod: CPTII,,, | Performed by: NURSE PRACTITIONER

## 2025-03-05 PROCEDURE — 1159F MED LIST DOCD IN RCRD: CPT | Mod: CPTII,,, | Performed by: NURSE PRACTITIONER

## 2025-03-05 RX ORDER — LABETALOL 200 MG/1
200 TABLET, FILM COATED ORAL EVERY 12 HOURS
Qty: 60 TABLET | Refills: 11 | Status: SHIPPED | OUTPATIENT
Start: 2025-03-05 | End: 2026-03-05

## 2025-03-05 RX ORDER — NIFEDIPINE 60 MG/1
60 TABLET, EXTENDED RELEASE ORAL DAILY
Qty: 30 TABLET | Refills: 11 | Status: SHIPPED | OUTPATIENT
Start: 2025-03-05 | End: 2026-03-05

## 2025-03-05 NOTE — ASSESSMENT & PLAN NOTE
Improved.  Increase nifedipine to 60 mg daily.  Decrease labetalol to 200 mg b.i.d..  Encouraged low-sodium diet   Continue monitor blood pressure at home and report readings consistently above 140/90.    Will have her follow up in 1 month with blood pressure log.  Consider decreasing dose of labetalol if blood pressure remains controlled.

## 2025-03-05 NOTE — PROGRESS NOTES
Internal Medicine      Patient Name:  Tawnya Love  Patient ID:  [id number]    Chief Complaint:  Follow-up (B/p)      Tawnya Love is a 39 y.o. female, known to Dr Stewart, is here today for blood pressure follow up.  Medical comorbidities include HTN, HLD, obesity.    History of Present Illness    CHIEF COMPLAINT:  Ms. Love presents today for blood pressure follow up    HYPERTENSION:  She reports home blood pressure readings in the 130s/80s. She continues labetalol t.i.d. and Procardia daily for blood pressure management and denies associated symptoms such as headache, chest pain, or palpitations.  She has a history of preeclampsia during pregnancy, previously treated with Procardia.  States she is not trying for another pregnancy.       Last AWV:  24       Past Medical History:   Diagnosis Date    Essential (primary) hypertension     Mixed hyperlipidemia     Personal history of gestational diabetes      labor in second trimester with  delivery in second trimester     Thyroid disease     Well adult exam         Past Surgical History:   Procedure Laterality Date    CERVICAL CERCLAGE N/A 2022    DILATION AND CURETTAGE OF UTERUS          Social History     Tobacco Use    Smoking status: Never     Passive exposure: Never    Smokeless tobacco: Never   Substance and Sexual Activity    Alcohol use: Not Currently    Drug use: Never    Sexual activity: Not Currently     Partners: Male     Birth control/protection: None        Current Outpatient Medications   Medication Instructions    labetaloL (NORMODYNE) 200 mg, Oral, Every 12 hours    NIFEdipine (PROCARDIA-XL) 60 mg, Oral, Daily       Review of patient's allergies indicates:  No Known Allergies     Patient Care Team:  Shawna Stewart MD as PCP - General (Internal Medicine)  Robbie Emanuel MD as OB/GYN (Obstetrics and Gynecology)  Tobias Alfnoso MD as Consulting Physician (Maternal and Fetal Medicine)  "      Subjective:    Review of Systems   Constitutional:  Negative for appetite change, chills, diaphoresis and fever.   HENT:  Negative for congestion, ear pain, sinus pain and sore throat.    Eyes:  Negative for pain and visual disturbance.   Respiratory:  Negative for cough, shortness of breath and wheezing.    Cardiovascular:  Negative for chest pain, palpitations and leg swelling.   Gastrointestinal:  Negative for abdominal pain, constipation, diarrhea, nausea and vomiting.   Endocrine: Negative for cold intolerance.   Genitourinary:  Negative for difficulty urinating, dysuria, frequency and hematuria.   Musculoskeletal:  Negative for arthralgias and myalgias.   Skin:  Negative for color change and rash.   Allergic/Immunologic: Negative.    Neurological:  Negative for dizziness, syncope, light-headedness, numbness and headaches.   Hematological: Negative.    Psychiatric/Behavioral: Negative.  Negative for dysphoric mood. The patient is not nervous/anxious.    All other systems reviewed and are negative.      Objective:    Visit Vitals  /78 (BP Location: Left arm, Patient Position: Sitting)   Pulse 71   Ht 5' 3" (1.6 m)   Wt 87.1 kg (192 lb)   SpO2 98%   BMI 34.01 kg/m²       Physical Exam  Vitals and nursing note reviewed.   Constitutional:       General: She is not in acute distress.     Appearance: She is obese. She is not ill-appearing.   HENT:      Head: Normocephalic and atraumatic.      Mouth/Throat:      Mouth: Mucous membranes are moist.      Pharynx: Oropharynx is clear.   Eyes:      General: No scleral icterus.     Extraocular Movements: Extraocular movements intact.      Conjunctiva/sclera: Conjunctivae normal.      Pupils: Pupils are equal, round, and reactive to light.   Neck:      Vascular: No carotid bruit.   Cardiovascular:      Rate and Rhythm: Normal rate and regular rhythm.      Heart sounds: No murmur heard.     No friction rub. No gallop.   Pulmonary:      Effort: Pulmonary effort is " normal. No respiratory distress.      Breath sounds: Normal breath sounds. No wheezing, rhonchi or rales.   Abdominal:      General: Bowel sounds are normal. There is no distension.      Palpations: Abdomen is soft. There is no mass.      Tenderness: There is no abdominal tenderness.   Musculoskeletal:         General: Normal range of motion.      Cervical back: Normal range of motion and neck supple.   Lymphadenopathy:      Cervical: No cervical adenopathy.   Skin:     General: Skin is warm and dry.      Capillary Refill: Capillary refill takes less than 2 seconds.   Neurological:      General: No focal deficit present.      Mental Status: She is alert and oriented to person, place, and time.   Psychiatric:         Mood and Affect: Mood normal.         Behavior: Behavior normal.         Labs Reviewed:    Chemistry:  Lab Results   Component Value Date     11/26/2024    K 4.2 11/26/2024    BUN 11.2 11/26/2024    CREATININE 0.83 11/26/2024    EGFRNORACEVR >60 11/26/2024    GLUCOSE 91 11/26/2024    CALCIUM 9.2 11/26/2024    ALKPHOS 79 11/26/2024    LABPROT 7.1 11/26/2024    ALBUMIN 4.1 11/26/2024    BILIDIR 0.2 10/29/2021    IBILI 0.30 10/29/2021    AST 12 11/26/2024    ALT 11 11/26/2024    VLMUCBXT06YH 19 (L) 11/26/2024    TSH 0.979 11/26/2024    [id number] 1.05 01/28/2019        Lab Results   Component Value Date    HGBA1C 5.2 11/26/2024        Hematology:  Lab Results   Component Value Date    WBC 5.32 11/26/2024    HGB 12.8 11/26/2024    HCT 39.4 11/26/2024     11/26/2024       Lipid Panel:  Lab Results   Component Value Date    CHOL 246 (H) 11/26/2024    HDL 37 11/26/2024    .00 (H) 11/26/2024    TRIG 180 (H) 11/26/2024    TOTALCHOLEST 7 (H) 11/26/2024        Urine:  Lab Results   Component Value Date    APPEARANCEUA Clear 05/08/2022    SGUA 1.009 05/08/2022    PROTEINUA Negative 05/08/2022    KETONESUA Negative 05/08/2022    LEUKOCYTESUR Negative 05/08/2022    RBCUA None Seen 05/08/2022     WBCUA None Seen 05/08/2022    BACTERIA Rare 05/08/2022    CREATRANDUR 17.8 (L) 03/13/2024    PROTEINURINE <6.8 03/13/2024    UPROTCREA 0.1 03/10/2024          Assessment:      ICD-10-CM ICD-9-CM   1. Essential (primary) hypertension  I10 401.9        Plan:    1. Essential (primary) hypertension  Assessment & Plan:  Improved.  Increase nifedipine to 60 mg daily.  Decrease labetalol to 200 mg b.i.d..  Encouraged low-sodium diet   Continue monitor blood pressure at home and report readings consistently above 140/90.    Will have her follow up in 1 month with blood pressure log.  Consider decreasing dose of labetalol if blood pressure remains controlled.    Orders:  -     NIFEdipine (PROCARDIA-XL) 60 MG (OSM) 24 hr tablet; Take 1 tablet (60 mg total) by mouth once daily.  Dispense: 30 tablet; Refill: 11  -     labetaloL (NORMODYNE) 200 MG tablet; Take 1 tablet (200 mg total) by mouth every 12 (twelve) hours.  Dispense: 60 tablet; Refill: 11         Follow up in about 4 weeks (around 4/2/2025) for blood pressure check. In addition to their scheduled follow up, the patient has also been instructed to follow up on as needed basis.       Future Appointments   Date Time Provider Department Center   4/2/2025  1:00 PM Evelyn Saez FNP [facility] [place] [id number]   6/2/2025  1:00 PM Burt Romano FNP [facility] [place] [id number]   12/3/2025  1:20 PM Shawna Stewart MD [facility] [place] [id number]          [facility]      Disclaimer:  This note was generated with the assistance of ambient listening technology. Verbal consent was obtained by the patient and accompanying visitor(s) for the recording of patient appointment to facilitate this note. I attest to having reviewed and edited the generated note for accuracy, though some syntax or spelling errors may persist. Please contact the author of this note for any clarification.

## 2025-03-26 ENCOUNTER — TELEPHONE (OUTPATIENT)
Dept: INTERNAL MEDICINE | Facility: CLINIC | Age: 40
End: 2025-03-26
Payer: COMMERCIAL

## 2025-03-26 NOTE — TELEPHONE ENCOUNTER
----- Message from Med Assistant Melchor sent at 3/19/2025 11:39 AM CDT -----  Regarding: Previsit  Please contact pt about appointment on 04/02/2025 @1:00; with no labs

## 2025-04-08 ENCOUNTER — OFFICE VISIT (OUTPATIENT)
Dept: INTERNAL MEDICINE | Facility: CLINIC | Age: 40
End: 2025-04-08
Payer: COMMERCIAL

## 2025-04-08 VITALS
SYSTOLIC BLOOD PRESSURE: 124 MMHG | DIASTOLIC BLOOD PRESSURE: 78 MMHG | OXYGEN SATURATION: 98 % | WEIGHT: 193 LBS | HEART RATE: 64 BPM | HEIGHT: 63 IN | BODY MASS INDEX: 34.2 KG/M2

## 2025-04-08 DIAGNOSIS — I10 ESSENTIAL (PRIMARY) HYPERTENSION: Primary | Chronic | ICD-10-CM

## 2025-04-08 PROCEDURE — 3074F SYST BP LT 130 MM HG: CPT | Mod: CPTII,,, | Performed by: NURSE PRACTITIONER

## 2025-04-08 PROCEDURE — 3078F DIAST BP <80 MM HG: CPT | Mod: CPTII,,, | Performed by: NURSE PRACTITIONER

## 2025-04-08 PROCEDURE — 3008F BODY MASS INDEX DOCD: CPT | Mod: CPTII,,, | Performed by: NURSE PRACTITIONER

## 2025-04-08 PROCEDURE — 1159F MED LIST DOCD IN RCRD: CPT | Mod: CPTII,,, | Performed by: NURSE PRACTITIONER

## 2025-04-08 PROCEDURE — 1160F RVW MEDS BY RX/DR IN RCRD: CPT | Mod: CPTII,,, | Performed by: NURSE PRACTITIONER

## 2025-04-08 PROCEDURE — 99213 OFFICE O/P EST LOW 20 MIN: CPT | Mod: ,,, | Performed by: NURSE PRACTITIONER

## 2025-04-08 NOTE — PROGRESS NOTES
Internal Medicine      Patient Name:  Tawnya Love  Patient ID:  [id number]    Chief Complaint:  Follow-up (B/p)      Tawnya Love is a 39 y.o. female, known to Dr Stewart, is here today for blood pressure follow-up.  Medical comorbidities include HTN, HLD, obesity.  Also has history of preeclampsia during pregnancy, previously treated with Procardia.    At LOV, labetalol was decreased from t.i.d. to b.i.d. and nifedipine was increased to 60 mg daily.  She presents today for follow-up.  Home blood pressure log reviewed notes blood pressure averages low 120s / 70s.  Denies headache, chest pain, palpitations, vision changes.    Last AWV:  24       Past Medical History:   Diagnosis Date    Essential (primary) hypertension     Mixed hyperlipidemia     Personal history of gestational diabetes      labor in second trimester with  delivery in second trimester     Thyroid disease     Well adult exam         Past Surgical History:   Procedure Laterality Date    CERVICAL CERCLAGE N/A 2022    DILATION AND CURETTAGE OF UTERUS          Social History     Tobacco Use    Smoking status: Never     Passive exposure: Never    Smokeless tobacco: Never   Substance and Sexual Activity    Alcohol use: Not Currently    Drug use: Never    Sexual activity: Not Currently     Partners: Male     Birth control/protection: None        Current Outpatient Medications   Medication Instructions    labetaloL (NORMODYNE) 200 mg, Oral, Every 12 hours    NIFEdipine (PROCARDIA-XL) 60 mg, Oral, Daily       Review of patient's allergies indicates:  No Known Allergies     Patient Care Team:  Shawna Stewart MD as PCP - General (Internal Medicine)  Robbie Emanuel MD as OB/GYN (Obstetrics and Gynecology)  Tobias Alfonso MD as Consulting Physician (Maternal and Fetal Medicine)       Subjective:    Review of Systems   Constitutional:  Negative for appetite change, chills, diaphoresis and fever.   HENT:  Negative for  "congestion, ear pain, sinus pain and sore throat.    Eyes:  Negative for pain and visual disturbance.   Respiratory:  Negative for cough, shortness of breath and wheezing.    Cardiovascular:  Negative for chest pain, palpitations and leg swelling.   Gastrointestinal:  Negative for abdominal pain, constipation, diarrhea, nausea and vomiting.   Endocrine: Negative for cold intolerance.   Genitourinary:  Negative for difficulty urinating, dysuria, frequency and hematuria.   Musculoskeletal:  Negative for arthralgias and myalgias.   Skin:  Negative for color change and rash.   Allergic/Immunologic: Negative.    Neurological:  Negative for dizziness, syncope, light-headedness, numbness and headaches.   Hematological: Negative.    Psychiatric/Behavioral: Negative.  Negative for dysphoric mood. The patient is not nervous/anxious.    All other systems reviewed and are negative.      Objective:    Visit Vitals  /78 (BP Location: Left arm, Patient Position: Sitting)   Pulse 64   Ht 5' 3" (1.6 m)   Wt 87.5 kg (193 lb)   LMP 02/17/2025 (Exact Date)   SpO2 98%   Breastfeeding No   BMI 34.19 kg/m²       Physical Exam  Vitals and nursing note reviewed.   Constitutional:       General: She is not in acute distress.     Appearance: She is obese. She is not ill-appearing.   HENT:      Head: Normocephalic and atraumatic.      Mouth/Throat:      Mouth: Mucous membranes are moist.      Pharynx: Oropharynx is clear.   Eyes:      General: No scleral icterus.     Extraocular Movements: Extraocular movements intact.      Conjunctiva/sclera: Conjunctivae normal.      Pupils: Pupils are equal, round, and reactive to light.   Neck:      Vascular: No carotid bruit.   Cardiovascular:      Rate and Rhythm: Normal rate and regular rhythm.      Heart sounds: No murmur heard.     No friction rub. No gallop.   Pulmonary:      Effort: Pulmonary effort is normal. No respiratory distress.      Breath sounds: Normal breath sounds. No wheezing, " rhonchi or rales.   Abdominal:      General: Bowel sounds are normal. There is no distension.      Palpations: Abdomen is soft. There is no mass.      Tenderness: There is no abdominal tenderness.   Musculoskeletal:         General: Normal range of motion.      Cervical back: Normal range of motion and neck supple.   Lymphadenopathy:      Cervical: No cervical adenopathy.   Skin:     General: Skin is warm and dry.      Capillary Refill: Capillary refill takes less than 2 seconds.   Neurological:      General: No focal deficit present.      Mental Status: She is alert and oriented to person, place, and time.   Psychiatric:         Mood and Affect: Mood normal.         Behavior: Behavior normal.         Labs Reviewed:    Chemistry:  Lab Results   Component Value Date     11/26/2024    K 4.2 11/26/2024    BUN 11.2 11/26/2024    CREATININE 0.83 11/26/2024    EGFRNORACEVR >60 11/26/2024    GLUCOSE 91 11/26/2024    CALCIUM 9.2 11/26/2024    ALKPHOS 79 11/26/2024    LABPROT 7.1 11/26/2024    ALBUMIN 4.1 11/26/2024    BILIDIR 0.2 10/29/2021    IBILI 0.30 10/29/2021    AST 12 11/26/2024    ALT 11 11/26/2024    [id number] 19 (L) 11/26/2024    TSH 0.979 11/26/2024    [id number] 1.05 01/28/2019        Lab Results   Component Value Date    HGBA1C 5.2 11/26/2024        Hematology:  Lab Results   Component Value Date    WBC 5.32 11/26/2024    HGB 12.8 11/26/2024    HCT 39.4 11/26/2024     11/26/2024       Lipid Panel:  Lab Results   Component Value Date    CHOL 246 (H) 11/26/2024    HDL 37 11/26/2024    .00 (H) 11/26/2024    TRIG 180 (H) 11/26/2024    TOTALCHOLEST 7 (H) 11/26/2024        Urine:  Lab Results   Component Value Date    APPEARANCEUA Clear 05/08/2022    SGUA 1.009 05/08/2022    PROTEINUA Negative 05/08/2022    KETONESUA Negative 05/08/2022    LEUKOCYTESUR Negative 05/08/2022    RBCUA None Seen 05/08/2022    WBCUA None Seen 05/08/2022    BACTERIA Rare 05/08/2022    CREATRANDUR 17.8 (L) 03/13/2024     PROTEINURINE <6.8 03/13/2024    UPROTCREA 0.1 03/10/2024        Assessment:      ICD-10-CM ICD-9-CM   1. Essential (primary) hypertension  I10 401.9        Plan:    1. Essential (primary) hypertension  Assessment & Plan:  Well-controlled   Continue nifedipine 60 mg daily and labetalol 200 mg b.i.d.   Encouraged low-sodium diet   Continue to monitor blood pressure at home and report readings consistently above 140/90.           Follow up for Previously scheduled and PRN if need. In addition to their scheduled follow up, the patient has also been instructed to follow up on as needed basis.       Future Appointments   Date Time Provider Department Center   6/2/2025  1:00 PM Burt Romano FNP [facility] [place] [id number]   12/3/2025  1:20 PM Shawna Stewart MD [facility] [place] [id number]          [facility]

## 2025-05-13 ENCOUNTER — E-VISIT (OUTPATIENT)
Dept: INTERNAL MEDICINE | Facility: CLINIC | Age: 40
End: 2025-05-13
Payer: COMMERCIAL

## 2025-05-13 ENCOUNTER — PATIENT MESSAGE (OUTPATIENT)
Dept: INTERNAL MEDICINE | Facility: CLINIC | Age: 40
End: 2025-05-13
Payer: COMMERCIAL

## 2025-05-13 DIAGNOSIS — H10.33 ACUTE BACTERIAL CONJUNCTIVITIS OF BOTH EYES: Primary | ICD-10-CM

## 2025-05-14 PROBLEM — H10.33 ACUTE BACTERIAL CONJUNCTIVITIS OF BOTH EYES: Status: ACTIVE | Noted: 2025-05-14

## 2025-05-14 RX ORDER — NEOMYCIN SULFATE, POLYMYXIN B SULFATE AND DEXAMETHASONE 3.5; 10000; 1 MG/ML; [USP'U]/ML; MG/ML
1 SUSPENSION/ DROPS OPHTHALMIC EVERY 4 HOURS
Qty: 5 ML | Refills: 0 | Status: SHIPPED | OUTPATIENT
Start: 2025-05-14

## 2025-05-14 NOTE — PROGRESS NOTES
Patient ID: Tawnya Love is a 39 y.o. female.    Chief Complaint: General Illness (Entered automatically based on patient selection in [employer].)    The patient initiated a request through [employer] on 5/13/2025 for evaluation and management with a chief complaint of General Illness (Entered automatically based on patient selection in [employer].)     I evaluated the questionnaire submission on 05/14/2025.    [facility] Evisit [facility]-Common Problems    5/13/2025 10:19 PM CDT - Filed by Patient   What do you need help with? Red Eye   Do you agree to participate in an E-Visit? Yes   If you have any of the following symptoms, please go to the nearest Emergency Room or call 911: I acknowledge   Do you have any of the following pregnancy-related conditions? None   At least one photo is required for treatment. You may upload up to three photos of the affected area.    What is the main issue you would like addressed today? Eye drainage and redness, now experiencing some Discomfort about a 5/10.   Do you have any of the following eye symptoms? Redness in both eyes;  Eye pain or discomfort;  Eye discharge or crusting   Do you have any of the following additional symptoms? None of the above   How long have you had your eye symptoms? About a week   Do you have a fever? No   Did your symptoms begin after swimming? No   Have your eyes been exposed to any chemicals, creams, or drops that may be causing irritation? No   Have you had any recent eye injuries? No   Have you been exposed to anyone with similar symptoms? No   Do you wear contact lenses? Yes   Have you had issues with removing your contact lenses? No   Have you had any of the following conditions? None   Have you had any of the following eye conditions or treatments? None   What medications are you currently using for your eye symptoms? None   Provide any additional information you feel is important.    Are you able to take your vital signs? Yes   Systolic Blood Pressure:  130   Diastolic Blood Pressure: 86   Weight: 190   Height: 63   Pulse: 78   Temperature:    Respiration rate:    Pulse Oxygen:          Encounter Diagnosis   Name Primary?    Acute bacterial conjunctivitis of both eyes Yes        No orders of the defined types were placed in this encounter.     Medications Ordered This Encounter   Medications    neomycin-polymyxin-dexamethasone (MAXITROL) 3.5mg/mL-10,000 unit/mL-0.1 % DrpS     Sig: Place 1 drop into both eyes every 4 (four) hours.     Dispense:  5 mL     Refill:  0        Follow up for As scheduled.    Patient advised to wash hands frequently to avoid cross contamination and use drops as prescribed.      E-Visit Time Tracking:    Day 1 Time (in minutes): 10    Total Time (in minutes): 10

## 2025-05-28 ENCOUNTER — TELEPHONE (OUTPATIENT)
Dept: INTERNAL MEDICINE | Facility: CLINIC | Age: 40
End: 2025-05-28
Payer: COMMERCIAL

## 2025-05-28 NOTE — TELEPHONE ENCOUNTER
----- Message from Nurse Arroyo sent at 5/26/2025 12:43 PM CDT -----  Regarding: PV for 6/2/25  No labs for 6/2/25   yes